# Patient Record
Sex: FEMALE | Race: WHITE | NOT HISPANIC OR LATINO | Employment: OTHER | URBAN - METROPOLITAN AREA
[De-identification: names, ages, dates, MRNs, and addresses within clinical notes are randomized per-mention and may not be internally consistent; named-entity substitution may affect disease eponyms.]

---

## 2017-01-12 ENCOUNTER — ANTICOAGULATION THERAPY VISIT (OUTPATIENT)
Dept: NURSING | Facility: CLINIC | Age: 77
End: 2017-01-12
Payer: COMMERCIAL

## 2017-01-12 DIAGNOSIS — Z79.01 LONG-TERM (CURRENT) USE OF ANTICOAGULANTS: Primary | ICD-10-CM

## 2017-01-12 DIAGNOSIS — D68.9 COAGULATION DEFECT (H): ICD-10-CM

## 2017-01-12 LAB — INR POINT OF CARE: 1.8 (ref 0.86–1.14)

## 2017-01-12 PROCEDURE — 85610 PROTHROMBIN TIME: CPT | Mod: QW

## 2017-01-12 PROCEDURE — 36416 COLLJ CAPILLARY BLOOD SPEC: CPT

## 2017-01-12 PROCEDURE — 99207 ZZC NO CHARGE NURSE ONLY: CPT

## 2017-01-12 NOTE — PROGRESS NOTES
ANTICOAGULATION FOLLOW-UP CLINIC VISIT    Patient Name:  Susan Ferguson  Date:  1/12/2017  Contact Type:  Face to Face    SUBJECTIVE:     Patient Findings     Positives Diet Changes, No Problem Findings           OBJECTIVE    INR PROTIME   Date Value Ref Range Status   01/12/2017 1.8* 0.86 - 1.14 Final     FACTOR 2 ASSAY   Date Value Ref Range Status   03/03/2009 19* 60 - 140 % Final       ASSESSMENT / PLAN  INR assessment SUB    Recheck INR In: 4 WEEKS    INR Location Clinic      Anticoagulation Summary as of 1/12/2017     INR goal 2.0-3.0   Selected INR 1.8! (1/12/2017)   Maintenance plan 7.5 mg (5 mg x 1.5) on Mon, Thu; 5 mg (5 mg x 1) all other days   Full instructions 7.5 mg on Mon, Thu; 5 mg all other days   Weekly total 40 mg   Plan last modified Hortencia Nava RN (1/12/2017)   Next INR check 2/9/2017   Target end date     Indications   Long-term (current) use of anticoagulants [Z79.01] [Z79.01]  Coagulation defect (HCC) [D68.9] [D68.9]         Anticoagulation Episode Summary     INR check location     Preferred lab     Send INR reminders to [facility]AG[facility]    Comments       Anticoagulation Care Providers     Provider Role Specialty Phone number    Sharon Velaily Alexa,  [facility] Internal Medicine [phone]            See the Encounter Report to view Anticoagulation Flowsheet and Dosing Calendar (Go to Encounters tab in chart review, and find the Anticoagulation Therapy Visit)    Diet and exercise changes.  Dosing based on [facility] Protocol and Provider directed care plan.      Hortencia Nava, RN

## 2017-01-12 NOTE — MR AVS SNAPSHOT
Susan Ferguson   1/12/2017 11:15 AM   Anticoagulation Therapy Visit    Description:  76 year old female   Provider:   ANTICOAGULATION CLINIC   Department:  [facility] Nurse           INR as of 1/12/2017     Selected INR 1.8! (1/12/2017)      Anticoagulation Summary as of 1/12/2017     INR goal 2.0-3.0   Selected INR 1.8! (1/12/2017)   Full instructions 7.5 mg on Mon, Thu; 5 mg all other days   Next INR check 2/9/2017    Indications   Long-term (current) use of anticoagulants [Z79.01] [Z79.01]  Coagulation defect (HCC) [D68.9] [D68.9]         Your next Anticoagulation Clinic appointment(s)     Feb 09, 2017 11:15 AM   Anticoagulation Visit with  ANTICOAGULATION CLINIC   [facility]    [address]  [place] [address]   [phone]              Contact Numbers     Clinic Number:         January 2017 Details    Sun Mon Tue Wed Thu Fri Sat     1               2               3               4               5               6               7                 8               9               10               11               12      7.5 mg   See details      13      5 mg         14      5 mg           15      5 mg         16      7.5 mg         17      5 mg         18      5 mg         19      7.5 mg         20      5 mg         21      5 mg           22      5 mg         23      7.5 mg         24      5 mg         25      5 mg         26      7.5 mg         27      5 mg         28      5 mg           29      5 mg         30      7.5 mg         31      5 mg              Date Details   01/12 This INR check               How to take your warfarin dose     To take:  5 mg Take 1 of the 5 mg tablets.    To take:  7.5 mg Take 1.5 of the 5 mg tablets.           February 2017 Details    Sun Mon Tue Wed Thu Fri Sat        1      5 mg         2      7.5 mg         3      5 mg         4      5 mg           5      5 mg         6      7.5 mg         7      5 mg         8      5 mg         9             10               11                 12               13               14               15               16               17               18                 19               20               21               22               23               24               25                 26               27               28                    Date Details   No additional details    Date of next INR:  2/9/2017         How to take your warfarin dose     To take:  5 mg Take 1 of the 5 mg tablets.    To take:  7.5 mg Take 1.5 of the 5 mg tablets.

## 2017-02-09 ENCOUNTER — ANTICOAGULATION THERAPY VISIT (OUTPATIENT)
Dept: NURSING | Facility: CLINIC | Age: 77
End: 2017-02-09
Payer: COMMERCIAL

## 2017-02-09 DIAGNOSIS — D68.9 COAGULATION DEFECT (H): ICD-10-CM

## 2017-02-09 DIAGNOSIS — Z79.01 LONG-TERM (CURRENT) USE OF ANTICOAGULANTS: Primary | ICD-10-CM

## 2017-02-09 LAB — INR POINT OF CARE: 1.8 (ref 0.86–1.14)

## 2017-02-09 PROCEDURE — 99207 ZZC NO CHARGE NURSE ONLY: CPT

## 2017-02-09 PROCEDURE — 85610 PROTHROMBIN TIME: CPT | Mod: QW

## 2017-02-09 PROCEDURE — 36416 COLLJ CAPILLARY BLOOD SPEC: CPT

## 2017-02-09 NOTE — PROGRESS NOTES
ANTICOAGULATION FOLLOW-UP CLINIC VISIT    Patient Name:  Susan Ferguson  Date:  2/9/2017  Contact Type:  Face to Face    SUBJECTIVE:     Patient Findings     Positives Diet Changes    Comments Diet changes, now eating greens every day.             OBJECTIVE    INR PROTIME   Date Value Ref Range Status   02/09/2017 1.8* 0.86 - 1.14 Final     FACTOR 2 ASSAY   Date Value Ref Range Status   03/03/2009 19* 60 - 140 % Final       ASSESSMENT / PLAN  INR assessment SUB    Recheck INR In: 2 WEEKS    INR Location Clinic      Anticoagulation Summary as of 2/9/2017     INR goal 2.0-3.0   Selected INR 1.8! (2/9/2017)   Maintenance plan 7.5 mg (5 mg x 1.5) on Mon, Wed, Fri; 5 mg (5 mg x 1) all other days   Full instructions 2/9: 7.5 mg; 2/10: 7.5 mg; Otherwise 7.5 mg on Mon, Wed, Fri; 5 mg all other days   Weekly total 42.5 mg   Plan last modified Socorro Bryant RN (2/9/2017)   Next INR check 2/23/2017   Target end date     Indications   Long-term (current) use of anticoagulants [Z79.01] [Z79.01]  Coagulation defect (HCC) [D68.9] [D68.9]         Anticoagulation Episode Summary     INR check location     Preferred lab     Send INR reminders to [facility] ANT[facility]AG[facility]    Comments       Anticoagulation Care Providers     Provider Role Specialty Phone number    Lety VelaDO [facility] Internal Medicine [phone]            See the Encounter Report to view Anticoagulation Flowsheet and Dosing Calendar (Go to Encounters tab in chart review, and find the Anticoagulation Therapy Visit)    Dosage adjustment made based on physician directed care plan.    Socorro Bryant RN

## 2017-02-09 NOTE — MR AVS SNAPSHOT
Susan Ferguson   2/9/2017 11:15 AM   Anticoagulation Therapy Visit    Description:  76 year old female   Provider:   ANTICOAGULATION CLINIC   Department:   Nurse           INR as of 2/9/2017     Selected INR 1.8! (2/9/2017)      Anticoagulation Summary as of 2/9/2017     INR goal 2.0-3.0   Selected INR 1.8! (2/9/2017)   Full instructions 2/9: 7.5 mg; 2/10: 7.5 mg; Otherwise 7.5 mg on Mon, Wed, Fri; 5 mg all other days   Next INR check 2/23/2017    Indications   Long-term (current) use of anticoagulants [Z79.01] [Z79.01]  Coagulation defect (HCC) [D68.9] [D68.9]         Your next Anticoagulation Clinic appointment(s)     Feb 09, 2017 11:15 AM   Anticoagulation Visit with  ANTICOAGULATION CLINIC   [facility] ([facility])    [address]  [place] [address]   [phone]            Feb 23, 2017 11:30 AM   Anticoagulation Visit with  ANTICOAGULATION CLINIC   [facility] ([facility])    [address]  [place] [address]   [phone]              Contact Numbers     Clinic Number:         February 2017 Details    Sun Mon Tue Wed Thu Fri Sat        1               2               3               4                 5               6               7               8               9      7.5 mg   See details      10      7.5 mg         11      5 mg           12      5 mg         13      7.5 mg         14      5 mg         15      7.5 mg         16      5 mg         17      7.5 mg         18      5 mg           19      5 mg         20      7.5 mg         21      5 mg         22      7.5 mg         23            24               25                 26               27               28                    Date Details   02/09 This INR check       Date of next INR:  2/23/2017         How to take your warfarin dose     To take:  5 mg Take 1 of the 5 mg tablets.    To take:  7.5 mg Take 1.5 of the 5 mg tablets.

## 2017-02-23 ENCOUNTER — ANTICOAGULATION THERAPY VISIT (OUTPATIENT)
Dept: NURSING | Facility: CLINIC | Age: 77
End: 2017-02-23
Payer: COMMERCIAL

## 2017-02-23 DIAGNOSIS — D68.9 COAGULATION DEFECT (H): ICD-10-CM

## 2017-02-23 DIAGNOSIS — Z79.01 LONG-TERM (CURRENT) USE OF ANTICOAGULANTS: ICD-10-CM

## 2017-02-23 LAB — INR POINT OF CARE: 2.8 (ref 0.86–1.14)

## 2017-02-23 PROCEDURE — 99207 ZZC NO CHARGE NURSE ONLY: CPT

## 2017-02-23 PROCEDURE — 36416 COLLJ CAPILLARY BLOOD SPEC: CPT

## 2017-02-23 PROCEDURE — 85610 PROTHROMBIN TIME: CPT | Mod: QW

## 2017-02-23 NOTE — MR AVS SNAPSHOT
Susan Ferguson   2/23/2017 11:30 AM   Anticoagulation Therapy Visit    Description:  76 year old female   Provider:   ANTICOAGULATION CLINIC   Department:   Nurse           INR as of 2/23/2017     Today's INR 2.8      Anticoagulation Summary as of 2/23/2017     INR goal 2.0-3.0   Today's INR 2.8   Full instructions 7.5 mg on Mon, Wed, Fri; 5 mg all other days   Next INR check 3/16/2017    Indications   Long-term (current) use of anticoagulants [Z79.01] [Z79.01]  Coagulation defect (HCC) [D68.9] [D68.9]         Your next Anticoagulation Clinic appointment(s)     Feb 23, 2017 11:30 AM CST   Anticoagulation Visit with  ANTICOAGULATION CLINIC   [facility]    [address]  [place] [address]   [phone]            Mar 16, 2017 11:15 AM CDT   Anticoagulation Visit with  ANTICOAGULATION CLINIC   [facility]    [address]  [place] [address]   [phone]              Contact Numbers     Clinic Number:         February 2017 Details    Sun Mon Tue Wed Thu Fri Sat        1               2               3               4                 5               6               7               8               9               10               11                 12               13               14               15               16               17               18                 19               20               21               22               23      5 mg   See details      24      7.5 mg         25      5 mg           26      5 mg         27      7.5 mg         28      5 mg              Date Details   02/23 This INR check               How to take your warfarin dose     To take:  5 mg Take 1 of the 5 mg tablets.    To take:  7.5 mg Take 1.5 of the 5 mg tablets.           March 2017 Details    Sun Mon Tue Wed Thu Fri Sat        1      7.5 mg         2      5 mg         3      7.5 mg         4      5 mg           5      5 mg          6      7.5 mg         7      5 mg         8      7.5 mg         9      5 mg         10      7.5 mg         11      5 mg           12      5 mg         13      7.5 mg         14      5 mg         15      7.5 mg         16            17               18                 19               20               21               22               23               24               25                 26               27               28               29               30               31                 Date Details   No additional details    Date of next INR:  3/16/2017         How to take your warfarin dose     To take:  5 mg Take 1 of the 5 mg tablets.    To take:  7.5 mg Take 1.5 of the 5 mg tablets.

## 2017-02-23 NOTE — PROGRESS NOTES
ANTICOAGULATION FOLLOW-UP CLINIC VISIT    Patient Name:  Susan Ferguson  Date:  2/23/2017  Contact Type:  Face to Face    SUBJECTIVE:        OBJECTIVE    INR Protime   Date Value Ref Range Status   02/23/2017 2.8 (A) 0.86 - 1.14 Final     Factor 2 Assay   Date Value Ref Range Status   03/03/2009 19 (L) 60 - 140 % Final       ASSESSMENT / PLAN  INR assessment THER    Recheck INR In: 4 WEEKS    INR Location Clinic      Anticoagulation Summary as of 2/23/2017     INR goal 2.0-3.0   Today's INR 2.8   Maintenance plan 7.5 mg (5 mg x 1.5) on Mon, Wed, Fri; 5 mg (5 mg x 1) all other days   Full instructions 7.5 mg on Mon, Wed, Fri; 5 mg all other days   Weekly total 42.5 mg   No change documented Melida Belle, EMANUEL   Plan last modified Socorro Bryant RN (2/9/2017)   Next INR check 3/16/2017   Target end date     Indications   Long-term (current) use of anticoagulants [Z79.01] [Z79.01]  Coagulation defect (HCC) [D68.9] [D68.9]         Anticoagulation Episode Summary     INR check location     Preferred lab     Send INR reminders to [facility]AG[facility]    Comments       Anticoagulation Care Providers     Provider Role Specialty Phone number    Lety Vela,  [facility] Internal Medicine [phone]            See the Encounter Report to view Anticoagulation Flowsheet and Dosing Calendar (Go to Encounters tab in chart review, and find the Anticoagulation Therapy Visit)    Dosage adjustment made based on physician directed care plan.    Melida Belle RN

## 2017-03-23 ENCOUNTER — ANTICOAGULATION THERAPY VISIT (OUTPATIENT)
Dept: NURSING | Facility: CLINIC | Age: 77
End: 2017-03-23
Payer: COMMERCIAL

## 2017-03-23 DIAGNOSIS — D68.9 COAGULATION DEFECT (H): ICD-10-CM

## 2017-03-23 DIAGNOSIS — Z79.01 LONG-TERM (CURRENT) USE OF ANTICOAGULANTS: ICD-10-CM

## 2017-03-23 LAB — INR POINT OF CARE: 2.9 (ref 0.86–1.14)

## 2017-03-23 PROCEDURE — 85610 PROTHROMBIN TIME: CPT | Mod: QW

## 2017-03-23 PROCEDURE — 36416 COLLJ CAPILLARY BLOOD SPEC: CPT

## 2017-03-23 PROCEDURE — 99207 ZZC NO CHARGE NURSE ONLY: CPT

## 2017-03-23 NOTE — PROGRESS NOTES
ANTICOAGULATION FOLLOW-UP CLINIC VISIT    Patient Name:  Susan Ferguson  Date:  3/23/2017  Contact Type:  Face to Face    SUBJECTIVE:     Patient Findings     Positives No Problem Findings           OBJECTIVE    INR Protime   Date Value Ref Range Status   03/23/2017 2.9 (A) 0.86 - 1.14 Final     Factor 2 Assay   Date Value Ref Range Status   03/03/2009 19 (L) 60 - 140 % Final       ASSESSMENT / PLAN  INR assessment THER    Recheck INR In: 4 WEEKS    INR Location Clinic      Anticoagulation Summary as of 3/23/2017     INR goal 2.0-3.0   Today's INR 2.9   Maintenance plan 7.5 mg (5 mg x 1.5) on Mon, Wed, Fri; 5 mg (5 mg x 1) all other days   Full instructions 7.5 mg on Mon, Wed, Fri; 5 mg all other days   Weekly total 42.5 mg   No change documented Hortencia Nava RN   Plan last modified Socorro Bryant RN (2/9/2017)   Next INR check 4/20/2017   Priority INR   Target end date     Indications   Long-term (current) use of anticoagulants [Z79.01] [Z79.01]  Coagulation defect (HCC) [D68.9] [D68.9]         Anticoagulation Episode Summary     INR check location     Preferred lab     Send INR reminders to [facility] ANTICOAGULATION    Comments       Anticoagulation Care Providers     Provider Role Specialty Phone number    VelaSharonLety DO Alexa [facility] Internal Medicine [phone]            See the Encounter Report to view Anticoagulation Flowsheet and Dosing Calendar (Go to Encounters tab in chart review, and find the Anticoagulation Therapy Visit)    Dosing based on [facility] Protocol and Provider directed care plan.      Hortencia Nava RN

## 2017-03-23 NOTE — MR AVS SNAPSHOT
Susan Ferguson   3/23/2017 11:15 AM   Anticoagulation Therapy Visit    Description:  76 year old female   Provider:   ANTICOAGULATION CLINIC   Department:   Nurse           INR as of 3/23/2017     Today's INR 2.9      Anticoagulation Summary as of 3/23/2017     INR goal 2.0-3.0   Today's INR 2.9   Full instructions 7.5 mg on Mon, Wed, Fri; 5 mg all other days   Next INR check 4/20/2017    Indications   Long-term (current) use of anticoagulants [Z79.01] [Z79.01]  Coagulation defect (HCC) [D68.9] [D68.9]         Your next Anticoagulation Clinic appointment(s)     Mar 23, 2017 11:15 AM CDT   Anticoagulation Visit with  ANTICOAGULATION CLINIC   [facility]    [address]  [place] [address]   [phone]            Mar 30, 2017 11:15 AM CDT   Anticoagulation Visit with  ANTICOAGULATION CLINIC   [facility])    [address]  [place] [address]   [phone]              Contact Numbers     Clinic Number:         March 2017 Details    Sun Mon Tue Wed Thu Fri Sat        1               2               3               4                 5               6               7               8               9               10               11                 12               13               14               15               16               17               18                 19               20               21               22               23      5 mg   See details      24      7.5 mg         25      5 mg           26      5 mg         27      7.5 mg         28      5 mg         29      7.5 mg         30      5 mg         31      7.5 mg           Date Details   03/23 This INR check               How to take your warfarin dose     To take:  5 mg Take 1 of the 5 mg tablets.    To take:  7.5 mg Take 1.5 of the 5 mg tablets.           April 2017 Details    Sun Mon Tue Wed Thu Fri Sat           1      5 mg           2      5 mg          3      7.5 mg         4      5 mg         5      7.5 mg         6      5 mg         7      7.5 mg         8      5 mg           9      5 mg         10      7.5 mg         11      5 mg         12      7.5 mg         13      5 mg         14      7.5 mg         15      5 mg           16      5 mg         17      7.5 mg         18      5 mg         19      7.5 mg         20            21               22                 23               24               25               26               27               28               29                 30                      Date Details   No additional details    Date of next INR:  4/20/2017         How to take your warfarin dose     To take:  5 mg Take 1 of the 5 mg tablets.    To take:  7.5 mg Take 1.5 of the 5 mg tablets.

## 2017-04-20 ENCOUNTER — ANTICOAGULATION THERAPY VISIT (OUTPATIENT)
Dept: NURSING | Facility: CLINIC | Age: 77
End: 2017-04-20
Payer: COMMERCIAL

## 2017-04-20 DIAGNOSIS — Z79.01 LONG-TERM (CURRENT) USE OF ANTICOAGULANTS: ICD-10-CM

## 2017-04-20 DIAGNOSIS — D68.9 COAGULATION DEFECT (H): ICD-10-CM

## 2017-04-20 LAB — INR POINT OF CARE: 3.8 (ref 0.86–1.14)

## 2017-04-20 PROCEDURE — 85610 PROTHROMBIN TIME: CPT | Mod: QW

## 2017-04-20 PROCEDURE — 36416 COLLJ CAPILLARY BLOOD SPEC: CPT

## 2017-04-20 PROCEDURE — 99207 ZZC NO CHARGE NURSE ONLY: CPT

## 2017-04-20 NOTE — MR AVS SNAPSHOT
Susan Ferguson   4/20/2017 11:30 AM   Anticoagulation Therapy Visit    Description:  76 year old female   Provider:   ANTICOAGULATION CLINIC   Department:   Nurse           INR as of 4/20/2017     Today's INR 3.8!      Anticoagulation Summary as of 4/20/2017     INR goal 2.0-3.0   Today's INR 3.8!   Full instructions 7.5 mg on Mon, Wed, Fri; 5 mg all other days   Next INR check 5/18/2017    Indications   Long-term (current) use of anticoagulants [Z79.01] [Z79.01]  Coagulation defect (HCC) [D68.9] [D68.9]         Your next Anticoagulation Clinic appointment(s)     Apr 20, 2017 11:30 AM CDT   Anticoagulation Visit with  ANTICOAGULATION CLINIC   [facility]    [address]  [place] [address]   [phone]            May 18, 2017 11:15 AM CDT   Anticoagulation Visit with  ANTICOAGULATION CLINIC   [facility])    [address]  [place] [address]   [phone]              Contact Numbers     Clinic Number:         April 2017 Details    Sun Mon Tue Wed Thu Fri Sat           1                 2               3               4               5               6               7               8                 9               10               11               12               13               14               15                 16               17               18               19               20      5 mg   See details      21      7.5 mg         22      5 mg           23      5 mg         24      7.5 mg         25      5 mg         26      7.5 mg         27      5 mg         28      7.5 mg         29      5 mg           30      5 mg                Date Details   04/20 This INR check               How to take your warfarin dose     To take:  5 mg Take 1 of the 5 mg tablets.    To take:  7.5 mg Take 1.5 of the 5 mg tablets.           May 2017 Details    Sun Mon Tue Wed Thu Fri Sat      1      7.5 mg         2      5 mg         3       7.5 mg         4      5 mg         5      7.5 mg         6      5 mg           7      5 mg         8      7.5 mg         9      5 mg         10      7.5 mg         11      5 mg         12      7.5 mg         13      5 mg           14      5 mg         15      7.5 mg         16      5 mg         17      7.5 mg         18            19               20                 21               22               23               24               25               26               27                 28               29               30               31                   Date Details   No additional details    Date of next INR:  5/18/2017         How to take your warfarin dose     To take:  5 mg Take 1 of the 5 mg tablets.    To take:  7.5 mg Take 1.5 of the 5 mg tablets.

## 2017-04-20 NOTE — PROGRESS NOTES
ANTICOAGULATION FOLLOW-UP CLINIC VISIT    Patient Name:  Susan Ferguson  Date:  4/20/2017  Contact Type:  Face to Face    SUBJECTIVE:        OBJECTIVE    INR Protime   Date Value Ref Range Status   04/20/2017 3.8 (A) 0.86 - 1.14 Final     Factor 2 Assay   Date Value Ref Range Status   03/03/2009 19 (L) 60 - 140 % Final       ASSESSMENT / PLAN  INR assessment SUPRA    Recheck INR In: 4 WEEKS    INR Location Clinic      Anticoagulation Summary as of 4/20/2017     INR goal 2.0-3.0   Today's INR 3.8!   Maintenance plan 7.5 mg (5 mg x 1.5) on Mon, Wed, Fri; 5 mg (5 mg x 1) all other days   Full instructions 7.5 mg on Mon, Wed, Fri; 5 mg all other days   Weekly total 42.5 mg   No change documented Hortencia Nava RN   Plan last modified Socorro Bryant RN (2/9/2017)   Next INR check 5/18/2017   Priority INR   Target end date     Indications   Long-term (current) use of anticoagulants [Z79.01] [Z79.01]  Coagulation defect (HCC) [D68.9] [D68.9]         Anticoagulation Episode Summary     INR check location     Preferred lab     Send INR reminders to [facility] ANTICOAGULATION    Comments       Anticoagulation Care Providers     Provider Role Specialty Phone number    Lety Vela,  [facility] Internal Medicine [phone]            See the Encounter Report to view Anticoagulation Flowsheet and Dosing Calendar (Go to Encounters tab in chart review, and find the Anticoagulation Therapy Visit)    Patient prefers to increase Vit K intake rather than hold dose or change dose.  No signs of bruising or bleeding.  Understands to watch for signs and call right away if seen. Dosing based on [facility] Protocol and Provider directed care plan.      Hortencia Nava, RN

## 2017-05-18 ENCOUNTER — ANTICOAGULATION THERAPY VISIT (OUTPATIENT)
Dept: NURSING | Facility: CLINIC | Age: 77
End: 2017-05-18
Payer: COMMERCIAL

## 2017-05-18 DIAGNOSIS — D68.9 COAGULATION DEFECT (H): ICD-10-CM

## 2017-05-18 DIAGNOSIS — Z79.01 LONG-TERM (CURRENT) USE OF ANTICOAGULANTS: ICD-10-CM

## 2017-05-18 LAB — INR POINT OF CARE: 2.5 (ref 0.86–1.14)

## 2017-05-18 PROCEDURE — 85610 PROTHROMBIN TIME: CPT | Mod: QW

## 2017-05-18 PROCEDURE — 36416 COLLJ CAPILLARY BLOOD SPEC: CPT

## 2017-05-18 PROCEDURE — 99207 ZZC NO CHARGE NURSE ONLY: CPT

## 2017-05-18 NOTE — MR AVS SNAPSHOT
Susan Ferguson   5/18/2017 11:15 AM   Anticoagulation Therapy Visit    Description:  76 year old female   Provider:   ANTICOAGULATION CLINIC   Department:   Nurse           INR as of 5/18/2017     Today's INR 2.5      Anticoagulation Summary as of 5/18/2017     INR goal 2.0-3.0   Today's INR 2.5   Full instructions 7.5 mg on Mon, Wed, Fri; 5 mg all other days   Next INR check 6/15/2017    Indications   Long-term (current) use of anticoagulants [Z79.01] [Z79.01]  Coagulation defect (HCC) [D68.9] [D68.9]         Your next Anticoagulation Clinic appointment(s)     May 18, 2017 11:15 AM CDT   Anticoagulation Visit with  ANTICOAGULATION CLINIC   [facility]    [address]  [place] [address]   [phone]            Nik 15, 2017 11:00 AM CDT   Anticoagulation Visit with  ANTICOAGULATION CLINIC   [facility]    [address]  [place] [address]   [phone]              Contact Numbers     Clinic Number:         May 2017 Details    Sun Mon Tue Wed Thu Fri Sat      1               2               3               4               5               6                 7               8               9               10               11               12               13                 14               15               16               17               18      5 mg   See details      19      7.5 mg         20      5 mg           21      5 mg         22      7.5 mg         23      5 mg         24      7.5 mg         25      5 mg         26      7.5 mg         27      5 mg           28      5 mg         29      7.5 mg         30      5 mg         31      7.5 mg             Date Details   05/18 This INR check               How to take your warfarin dose     To take:  5 mg Take 1 of the 5 mg tablets.    To take:  7.5 mg Take 1.5 of the 5 mg tablets.           June 2017 Details    Sun Mon Tue Wed Thu Fri Sat         1      5 mg         2       7.5 mg         3      5 mg           4      5 mg         5      7.5 mg         6      5 mg         7      7.5 mg         8      5 mg         9      7.5 mg         10      5 mg           11      5 mg         12      7.5 mg         13      5 mg         14      7.5 mg         15            16               17                 18               19               20               21               22               23               24                 25               26               27               28               29               30                 Date Details   No additional details    Date of next INR:  6/15/2017         How to take your warfarin dose     To take:  5 mg Take 1 of the 5 mg tablets.    To take:  7.5 mg Take 1.5 of the 5 mg tablets.

## 2017-06-15 ENCOUNTER — ANTICOAGULATION THERAPY VISIT (OUTPATIENT)
Dept: NURSING | Facility: CLINIC | Age: 77
End: 2017-06-15
Payer: COMMERCIAL

## 2017-06-15 DIAGNOSIS — Z79.01 LONG-TERM (CURRENT) USE OF ANTICOAGULANTS: ICD-10-CM

## 2017-06-15 DIAGNOSIS — D68.9 COAGULATION DEFECT (H): ICD-10-CM

## 2017-06-15 LAB — INR POINT OF CARE: 2.8 (ref 0.86–1.14)

## 2017-06-15 PROCEDURE — 85610 PROTHROMBIN TIME: CPT | Mod: QW

## 2017-06-15 PROCEDURE — 99207 ZZC NO CHARGE NURSE ONLY: CPT

## 2017-06-15 PROCEDURE — 36416 COLLJ CAPILLARY BLOOD SPEC: CPT

## 2017-06-15 NOTE — PROGRESS NOTES
ANTICOAGULATION FOLLOW-UP CLINIC VISIT    Patient Name:  Susan Ferguson  Date:  6/15/2017  Contact Type:  Face to Face    SUBJECTIVE:     Patient Findings     Positives No Problem Findings           OBJECTIVE    INR Protime   Date Value Ref Range Status   06/15/2017 2.8 (A) 0.86 - 1.14 Final     Factor 2 Assay   Date Value Ref Range Status   03/03/2009 19 (L) 60 - 140 % Final       ASSESSMENT / PLAN  INR assessment THER    Recheck INR In: 4 WEEKS    INR Location Clinic      Anticoagulation Summary as of 6/15/2017     INR goal 2.0-3.0   Today's INR 2.8   Maintenance plan 7.5 mg (5 mg x 1.5) on Mon, Wed, Fri; 5 mg (5 mg x 1) all other days   Full instructions 7.5 mg on Mon, Wed, Fri; 5 mg all other days   Weekly total 42.5 mg   No change documented Lien Ray RN   Plan last modified Socorro Bryant RN (2/9/2017)   Next INR check 7/13/2017   Priority INR   Target end date     Indications   Long-term (current) use of anticoagulants [Z79.01] [Z79.01]  Coagulation defect (HCC) [D68.9] [D68.9]         Anticoagulation Episode Summary     INR check location     Preferred lab     Send INR reminders to [facility] ANT[facility]AGUL[facility]    Comments       Anticoagulation Care Providers     Provider Role Specialty Phone number    Lety Vela DO [facility] Internal Medicine [phone]            See the Encounter Report to view Anticoagulation Flowsheet and Dosing Calendar (Go to Encounters tab in chart review, and find the Anticoagulation Therapy Visit)    Dosage adjustment made based on physician directed care plan.    Lien Ray, RN

## 2017-06-15 NOTE — MR AVS SNAPSHOT
Susan Ferguson   6/15/2017 11:00 AM   Anticoagulation Therapy Visit    Description:  77 year old female   Provider:   ANTICOAGULATION CLINIC   Department:   Nurse           INR as of 6/15/2017     Today's INR 2.8      Anticoagulation Summary as of 6/15/2017     INR goal 2.0-3.0   Today's INR 2.8   Full instructions 7.5 mg on Mon, Wed, Fri; 5 mg all other days   Next INR check 7/13/2017    Indications   Long-term (current) use of anticoagulants [Z79.01] [Z79.01]  Coagulation defect (HCC) [D68.9] [D68.9]         Your next Anticoagulation Clinic appointment(s)     Nik 15, 2017 11:00 AM CDT   Anticoagulation [facility] with  [facility] CLINIC   [facility]    [address]  [place] [address]   [phone]            Jul 13, 2017 11:00 AM CDT   Anticoagulation Visit with  ANTICOAGULATION CLINIC   [facility]    [address]  [place] [address]   [phone]              Contact Numbers     Clinic Number:         June 2017 Details    Sun Mon Tue Wed Thu Fri Sat         1               2               3                 4               5               6               7               8               9               10                 11               12               13               14               15      5 mg   See details      16      7.5 mg         17      5 mg           18      5 mg         19      7.5 mg         20      5 mg         21      7.5 mg         22      5 mg         23      7.5 mg         24      5 mg           25      5 mg         26      7.5 mg         27      5 mg         28      7.5 mg         29      5 mg         30      7.5 mg           Date Details   06/15 This INR check               How to take your warfarin dose     To take:  5 mg Take 1 of the 5 mg tablets.    To take:  7.5 mg Take 1.5 of the 5 mg tablets.           July 2017 Details    Sun Mon Tue Wed Thu Fri Sat           1      5 mg           2       5 mg         3      7.5 mg         4      5 mg         5      7.5 mg         6      5 mg         7      7.5 mg         8      5 mg           9      5 mg         10      7.5 mg         11      5 mg         12      7.5 mg         13            14               15                 16               17               18               19               20               21               22                 23               24               25               26               27               28               29                 30               31                     Date Details   No additional details    Date of next INR:  7/13/2017         How to take your warfarin dose     To take:  5 mg Take 1 of the 5 mg tablets.    To take:  7.5 mg Take 1.5 of the 5 mg tablets.

## 2017-07-13 ENCOUNTER — ANTICOAGULATION THERAPY VISIT (OUTPATIENT)
Dept: NURSING | Facility: CLINIC | Age: 77
End: 2017-07-13
Payer: COMMERCIAL

## 2017-07-13 DIAGNOSIS — D68.9 COAGULATION DEFECT (H): ICD-10-CM

## 2017-07-13 DIAGNOSIS — Z79.01 LONG-TERM (CURRENT) USE OF ANTICOAGULANTS: ICD-10-CM

## 2017-07-13 LAB — INR POINT OF CARE: 3.9 (ref 0.86–1.14)

## 2017-07-13 PROCEDURE — 99207 ZZC NO CHARGE NURSE ONLY: CPT

## 2017-07-13 PROCEDURE — 85610 PROTHROMBIN TIME: CPT | Mod: QW

## 2017-07-13 PROCEDURE — 36416 COLLJ CAPILLARY BLOOD SPEC: CPT

## 2017-07-13 RX ORDER — WARFARIN SODIUM 5 MG/1
5-7.5 TABLET ORAL DAILY
Qty: 135 TABLET | Refills: 0 | Status: SHIPPED | OUTPATIENT
Start: 2017-07-13 | End: 2017-07-13

## 2017-07-13 RX ORDER — WARFARIN SODIUM 5 MG/1
5-7.5 TABLET ORAL DAILY
Qty: 135 TABLET | Refills: 0 | Status: SHIPPED | OUTPATIENT
Start: 2017-07-13 | End: 2017-10-26

## 2017-07-13 NOTE — MR AVS SNAPSHOT
Susan Ferguson   7/13/2017 11:00 AM   Anticoagulation Therapy Visit    Description:  77 year old female   Provider:   ANTICOAGULATION CLINIC   Department:   Nurse           INR as of 7/13/2017     Today's INR 3.9!      Anticoagulation Summary as of 7/13/2017     INR goal 2.0-3.0   Today's INR 3.9!   Full instructions 7/13: Hold; Otherwise 7.5 mg on Mon, Wed, Fri; 5 mg all other days   Next INR check 8/3/2017    Indications   Long-term (current) use of anticoagulants [Z79.01] [Z79.01]  Coagulation defect (HCC) [D68.9] [D68.9]         Your next Anticoagulation Clinic appointment(s)     Jul 13, 2017 11:00 AM CDT   Anticoagulation Visit with  ANTICOAGULATION CLINIC   [facility]    [address]  [place] [address]   [phone]            Aug 03, 2017 11:15 AM CDT   Anticoagulation Visit with  ANTICOAGULATION CLINIC   [facility]    [address]  [place] [address]   [phone]              Contact Numbers     Clinic Number:         July 2017 Details    Sun Mon Tue Wed Thu Fri Sat           1                 2               3               4               5               6               7               8                 9               10               11               12               13      Hold   See details      14      7.5 mg         15      5 mg           16      5 mg         17      7.5 mg         18      5 mg         19      7.5 mg         20      5 mg         21      7.5 mg         22      5 mg           23      5 mg         24      7.5 mg         25      5 mg         26      7.5 mg         27      5 mg         28      7.5 mg         29      5 mg           30      5 mg         31      7.5 mg               Date Details   07/13 This INR check               How to take your warfarin dose     To take:  5 mg Take 1 of the 5 mg tablets.    To take:  7.5 mg Take 1.5 of the 5 mg tablets.    Hold Do not take your  warfarin dose. See the Details table to the right for additional instructions.                August 2017 Details    Sun Mon Tue Wed Thu Fri Sat       1      5 mg         2      7.5 mg         3            4               5                 6               7               8               9               10               11               12                 13               14               15               16               17               18               19                 20               21               22               23               24               25               26                 27               28               29               30               31                  Date Details   No additional details    Date of next INR:  8/3/2017         How to take your warfarin dose     To take:  5 mg Take 1 of the 5 mg tablets.    To take:  7.5 mg Take 1.5 of the 5 mg tablets.

## 2017-07-13 NOTE — PROGRESS NOTES
ANTICOAGULATION FOLLOW-UP CLINIC VISIT    Patient Name:  Susan Ferguson  Date:  7/13/2017  Contact Type:  Face to Face    SUBJECTIVE:     Patient Findings     Comments Increased pain from overdoing it at exercise class.  Taking max doses of Tylenol.            OBJECTIVE    INR Protime   Date Value Ref Range Status   07/13/2017 3.9 (A) 0.86 - 1.14 Final     Factor 2 Assay   Date Value Ref Range Status   03/03/2009 19 (L) 60 - 140 % Final       ASSESSMENT / PLAN  INR assessment SUPRA    Recheck INR In: 3 WEEKS    INR Location Clinic      Anticoagulation Summary as of 7/13/2017     INR goal 2.0-3.0   Today's INR 3.9!   Maintenance plan 7.5 mg (5 mg x 1.5) on Mon, Wed, Fri; 5 mg (5 mg x 1) all other days   Full instructions 7/13: Hold; Otherwise 7.5 mg on Mon, Wed, Fri; 5 mg all other days   Weekly total 42.5 mg   Plan last modified Socorro Bryant RN (2/9/2017)   Next INR check 8/3/2017   Priority INR   Target end date     Indications   Long-term (current) use of anticoagulants [Z79.01] [Z79.01]  Coagulation defect (HCC) [D68.9] [D68.9]         Anticoagulation Episode Summary     INR check location     Preferred lab     Send INR reminders to [facility]AG[facility]    Comments       Anticoagulation Care Providers     Provider Role Specialty Phone number    Lety Vela DO [facility] Internal Medicine [phone]            See the Encounter Report to view Anticoagulation Flowsheet and Dosing Calendar (Go to Encounters tab in chart review, and find the Anticoagulation Therapy Visit)    Dosage adjustment made based on physician directed care plan.    Lien Ray RN

## 2017-08-03 ENCOUNTER — ANTICOAGULATION THERAPY VISIT (OUTPATIENT)
Dept: NURSING | Facility: CLINIC | Age: 77
End: 2017-08-03
Payer: COMMERCIAL

## 2017-08-03 DIAGNOSIS — D68.9 COAGULATION DEFECT (H): ICD-10-CM

## 2017-08-03 DIAGNOSIS — Z79.01 LONG-TERM (CURRENT) USE OF ANTICOAGULANTS: ICD-10-CM

## 2017-08-03 LAB — INR POINT OF CARE: 3.2 (ref 0.86–1.14)

## 2017-08-03 PROCEDURE — 36416 COLLJ CAPILLARY BLOOD SPEC: CPT

## 2017-08-03 PROCEDURE — 99207 ZZC NO CHARGE NURSE ONLY: CPT

## 2017-08-03 PROCEDURE — 85610 PROTHROMBIN TIME: CPT | Mod: QW

## 2017-08-03 NOTE — MR AVS SNAPSHOT
Susan Ferguson   8/3/2017 11:15 AM   Anticoagulation Therapy Visit    Description:  77 year old female   Provider:   ANTICOAGULATION CLINIC   Department:   Nurse           INR as of 8/3/2017     Today's INR 3.2!      Anticoagulation Summary as of 8/3/2017     INR goal 2.0-3.0   Today's INR 3.2!   Full instructions 8/4: 5 mg; Otherwise 7.5 mg on Mon, Wed, Fri; 5 mg all other days   Next INR check 8/31/2017    Indications   Long-term (current) use of anticoagulants [Z79.01] [Z79.01]  Coagulation defect (HCC) [D68.9] [D68.9]         Your next Anticoagulation Clinic appointment(s)     Aug 03, 2017 11:15 AM CDT   Anticoagulation Visit with  ANTICOAGULATION CLINIC   [facility]    [address]  [place] [address]   [phone]            Aug 31, 2017 11:15 AM CDT   Anticoagulation Visit with  ANTICOAGULATION CLINIC   [facility]    [address]  [place] [address]   [phone]              Contact Numbers     Clinic Number:         August 2017 Details    Sun Mon Tue Wed Thu Fri Sat       1               2               3      5 mg   See details      4      5 mg         5      5 mg           6      5 mg         7      7.5 mg         8      5 mg         9      7.5 mg         10      5 mg         11      7.5 mg         12      5 mg           13      5 mg         14      7.5 mg         15      5 mg         16      7.5 mg         17      5 mg         18      7.5 mg         19      5 mg           20      5 mg         21      7.5 mg         22      5 mg         23      7.5 mg         24      5 mg         25      7.5 mg         26      5 mg           27      5 mg         28      7.5 mg         29      5 mg         30      7.5 mg         31               Date Details   08/03 This INR check       Date of next INR:  8/31/2017         How to take your warfarin dose     To take:  5 mg Take 1 of the 5 mg tablets.    To take:  7.5 mg Take  1.5 of the 5 mg tablets.

## 2017-08-03 NOTE — PROGRESS NOTES
ANTICOAGULATION FOLLOW-UP CLINIC VISIT    Patient Name:  Susan Ferguson  Date:  8/3/2017  Contact Type:  Face to Face    SUBJECTIVE:     Patient Findings     Comments Reports right shoulder pain.  Still doing water aerobics.    Also hasn't been eating her normal amt of greens.             OBJECTIVE    INR Protime   Date Value Ref Range Status   08/03/2017 3.2 (A) 0.86 - 1.14 Final     Factor 2 Assay   Date Value Ref Range Status   03/03/2009 19 (L) 60 - 140 % Final       ASSESSMENT / PLAN  INR assessment SUPRA    Recheck INR In: 4 WEEKS    INR Location Clinic      Anticoagulation Summary as of 8/3/2017     INR goal 2.0-3.0   Today's INR 3.2!   Maintenance plan 7.5 mg (5 mg x 1.5) on Mon, Wed, Fri; 5 mg (5 mg x 1) all other days   Full instructions 8/4: 5 mg; Otherwise 7.5 mg on Mon, Wed, Fri; 5 mg all other days   Weekly total 42.5 mg   Plan last modified Socorro Bryant RN (2/9/2017)   Next INR check 8/31/2017   Priority INR   Target end date     Indications   Long-term (current) use of anticoagulants [Z79.01] [Z79.01]  Coagulation defect (HCC) [D68.9] [D68.9]         Anticoagulation Episode Summary     INR check location     Preferred lab     Send INR reminders to [facility]AG[facility]    Comments       Anticoagulation Care Providers     Provider Role Specialty Phone number    VelaLety davey DO [facility] Internal Medicine [phone]            See the Encounter Report to view Anticoagulation Flowsheet and Dosing Calendar (Go to Encounters tab in chart review, and find the Anticoagulation Therapy Visit)    Dosage adjustment made based on physician directed care plan.    Lien Ray RN

## 2017-08-31 ENCOUNTER — ANTICOAGULATION THERAPY VISIT (OUTPATIENT)
Dept: NURSING | Facility: CLINIC | Age: 77
End: 2017-08-31
Payer: COMMERCIAL

## 2017-08-31 DIAGNOSIS — Z79.01 LONG-TERM (CURRENT) USE OF ANTICOAGULANTS: ICD-10-CM

## 2017-08-31 DIAGNOSIS — D68.9 COAGULATION DEFECT (H): ICD-10-CM

## 2017-08-31 LAB — INR POINT OF CARE: 1.9 (ref 0.86–1.14)

## 2017-08-31 PROCEDURE — 85610 PROTHROMBIN TIME: CPT | Mod: QW

## 2017-08-31 PROCEDURE — 99207 ZZC NO CHARGE NURSE ONLY: CPT

## 2017-08-31 PROCEDURE — 36416 COLLJ CAPILLARY BLOOD SPEC: CPT

## 2017-08-31 NOTE — PROGRESS NOTES
ANTICOAGULATION FOLLOW-UP CLINIC VISIT    Patient Name:  Susan Ferguson  Date:  8/31/2017  Contact Type:  Face to Face    SUBJECTIVE:     Patient Findings     Positives Missed doses (MIssed Sat dose last week)           OBJECTIVE    INR Protime   Date Value Ref Range Status   08/31/2017 1.9 (A) 0.86 - 1.14 Final     Factor 2 Assay   Date Value Ref Range Status   03/03/2009 19 (L) 60 - 140 % Final       ASSESSMENT / PLAN  INR assessment THER    Recheck INR In: 4 WEEKS    INR Location Clinic      Anticoagulation Summary as of 8/31/2017     INR goal 2.0-3.0   Today's INR 1.9!   Maintenance plan 7.5 mg (5 mg x 1.5) on Mon, Wed, Fri; 5 mg (5 mg x 1) all other days   Full instructions 8/31: 7.5 mg; Otherwise 7.5 mg on Mon, Wed, Fri; 5 mg all other days   Weekly total 42.5 mg   Plan last modified Socorro Bryant RN (2/9/2017)   Next INR check 9/28/2017   Priority INR   Target end date     Indications   Long-term (current) use of anticoagulants [Z79.01] [Z79.01]  Coagulation defect (HCC) [D68.9] [D68.9]         Anticoagulation Episode Summary     INR check location     Preferred lab     Send INR reminders to [facility]AG[facility]    Comments       Anticoagulation Care Providers     Provider Role Specialty Phone number    Lety Vela DO [facility] Internal Medicine [phone]            See the Encounter Report to view Anticoagulation Flowsheet and Dosing Calendar (Go to Encounters tab in chart review, and find the Anticoagulation Therapy Visit)    Dosage adjustment made based on physician directed care plan.    Lien Ray RN

## 2017-08-31 NOTE — MR AVS SNAPSHOT
Susan Ferguson   8/31/2017 11:15 AM   Anticoagulation Therapy Visit    Description:  77 year old female   Provider:   ANTICOAGULATION CLINIC   Department:   Nurse           INR as of 8/31/2017     Today's INR 1.9!      Anticoagulation Summary as of 8/31/2017     INR goal 2.0-3.0   Today's INR 1.9!   Full instructions 8/31: 7.5 mg; Otherwise 7.5 mg on Mon, Wed, Fri; 5 mg all other days   Next INR check 9/28/2017    Indications   Long-term (current) use of anticoagulants [Z79.01] [Z79.01]  Coagulation defect (HCC) [D68.9] [D68.9]         Your next Anticoagulation Clinic appointment(s)     Aug 31, 2017 11:15 AM CDT   Anticoagulation Visit with  ANTICOAGULATION CLINIC   [facility]    [address]  [place] [address]   [phone]            Sep 28, 2017 10:15 AM CDT   Anticoagulation Visit with  ANTICOAGULATION CLINIC   [facility]    [address]  [place] [address]   [phone]              Contact Numbers     Clinic Number:         August 2017 Details    Sun Mon Tue Wed Thu Fri Sat       1               2               3               4               5                 6               7               8               9               10               11               12                 13               14               15               16               17               18               19                 20               21               22               23               24               25               26                 27               28               29               30               31      7.5 mg   See details         Date Details   08/31 This INR check               How to take your warfarin dose     To take:  7.5 mg Take 1.5 of the 5 mg tablets.           September 2017 Details    Sun Mon Tue Wed Thu Fri Sat          1      7.5 mg         2      5 mg           3      5 mg         4      7.5 mg         5      5  mg         6      7.5 mg         7      5 mg         8      7.5 mg         9      5 mg           10      5 mg         11      7.5 mg         12      5 mg         13      7.5 mg         14      5 mg         15      7.5 mg         16      5 mg           17      5 mg         18      7.5 mg         19      5 mg         20      7.5 mg         21      5 mg         22      7.5 mg         23      5 mg           24      5 mg         25      7.5 mg         26      5 mg         27      7.5 mg         28            29               30                Date Details   No additional details    Date of next INR:  9/28/2017         How to take your warfarin dose     To take:  5 mg Take 1 of the 5 mg tablets.    To take:  7.5 mg Take 1.5 of the 5 mg tablets.

## 2017-09-28 ENCOUNTER — ANTICOAGULATION THERAPY VISIT (OUTPATIENT)
Dept: NURSING | Facility: CLINIC | Age: 77
End: 2017-09-28
Payer: COMMERCIAL

## 2017-09-28 DIAGNOSIS — Z79.01 LONG-TERM (CURRENT) USE OF ANTICOAGULANTS: ICD-10-CM

## 2017-09-28 DIAGNOSIS — D68.9 COAGULATION DEFECT (H): ICD-10-CM

## 2017-09-28 LAB — INR POINT OF CARE: 2.2 (ref 0.86–1.14)

## 2017-09-28 PROCEDURE — 99207 ZZC NO CHARGE NURSE ONLY: CPT

## 2017-09-28 PROCEDURE — 85610 PROTHROMBIN TIME: CPT | Mod: QW

## 2017-09-28 PROCEDURE — 36416 COLLJ CAPILLARY BLOOD SPEC: CPT

## 2017-09-28 NOTE — PROGRESS NOTES
ANTICOAGULATION FOLLOW-UP CLINIC VISIT    Patient Name:  Susan Ferguson  Date:  9/28/2017  Contact Type:  Face to Face    SUBJECTIVE:     Patient Findings     Positives No Problem Findings           OBJECTIVE    INR Protime   Date Value Ref Range Status   09/28/2017 2.2 (A) 0.86 - 1.14 Final     Factor 2 Assay   Date Value Ref Range Status   03/03/2009 19 (L) 60 - 140 % Final       ASSESSMENT / PLAN  INR assessment THER    Recheck INR In: 4 WEEKS    INR Location Clinic      Anticoagulation Summary as of 9/28/2017     INR goal 2.0-3.0   Today's INR 2.2   Maintenance plan 7.5 mg (5 mg x 1.5) on Mon, Wed, Fri; 5 mg (5 mg x 1) all other days   Full instructions 7.5 mg on Mon, Wed, Fri; 5 mg all other days   Weekly total 42.5 mg   No change documented Lien Ray RN   Plan last modified Socorro Bryant RN (2/9/2017)   Next INR check 10/26/2017   Priority INR   Target end date     Indications   Long-term (current) use of anticoagulants [Z79.01] [Z79.01]  Coagulation defect (HCC) [D68.9] [D68.9]         Anticoagulation Episode Summary     INR check location     Preferred lab     Send INR reminders to [facility] ANTICOAGUL[facility]    Comments       Anticoagulation Care Providers     Provider Role Specialty Phone number    Lety Vela DO [facility] Internal Medicine [phone]            See the Encounter Report to view Anticoagulation Flowsheet and Dosing Calendar (Go to Encounters tab in chart review, and find the Anticoagulation Therapy Visit)    Dosage adjustment made based on physician directed care plan.    Lien Ray, RN

## 2017-09-28 NOTE — MR AVS SNAPSHOT
Susan Ferguson   9/28/2017 10:15 AM   Anticoagulation Therapy Visit    Description:  77 year old female   Provider:   ANTICOAGULATION CLINIC   Department:  [facility] Nurse           INR as of 9/28/2017     Today's INR 2.2      Anticoagulation Summary as of 9/28/2017     INR goal 2.0-3.0   Today's INR 2.2   Full instructions 7.5 mg on Mon, Wed, Fri; 5 mg all other days   Next INR check 10/26/2017    Indications   Long-term (current) use of anticoagulants [Z79.01] [Z79.01]  Coagulation defect (HCC) [D68.9] [D68.9]         Your next Anticoagulation Clinic appointment(s)     Oct 26, 2017 11:15 AM CDT   Anticoagulation Visit with  ANTICOAGULATION CLINIC   [facility] ([facility])    [address]  [place] [address]   [phone]              Contact Numbers     Clinic Number:         September 2017 Details    Sun Mon Tue Wed Thu Fri Sat          1               2                 3               4               5               6               7               8               9                 10               11               12               13               14               15               16                 17               18               19               20               21               22               23                 24               25               26               27               28      5 mg   See details      29      7.5 mg         30      5 mg          Date Details   09/28 This INR check               How to take your warfarin dose     To take:  5 mg Take 1 of the 5 mg tablets.    To take:  7.5 mg Take 1.5 of the 5 mg tablets.           October 2017 Details    Sun Mon Tue Wed Thu Fri Sat     1      5 mg         2      7.5 mg         3      5 mg         4      7.5 mg         5      5 mg         6      7.5 mg         7      5 mg           8      5 mg         9      7.5 mg         10      5 mg         11      7.5 mg         12      5 mg         13      7.5 mg         14       5 mg           15      5 mg         16      7.5 mg         17      5 mg         18      7.5 mg         19      5 mg         20      7.5 mg         21      5 mg           22      5 mg         23      7.5 mg         24      5 mg         25      7.5 mg         26            27               28                 29               30               31                    Date Details   No additional details    Date of next INR:  10/26/2017         How to take your warfarin dose     To take:  5 mg Take 1 of the 5 mg tablets.    To take:  7.5 mg Take 1.5 of the 5 mg tablets.

## 2017-10-26 ENCOUNTER — ANTICOAGULATION THERAPY VISIT (OUTPATIENT)
Dept: NURSING | Facility: CLINIC | Age: 77
End: 2017-10-26
Payer: COMMERCIAL

## 2017-10-26 DIAGNOSIS — Z79.01 LONG-TERM (CURRENT) USE OF ANTICOAGULANTS: ICD-10-CM

## 2017-10-26 DIAGNOSIS — D68.9 COAGULATION DEFECT (H): ICD-10-CM

## 2017-10-26 LAB — INR POINT OF CARE: 4.1 (ref 0.86–1.14)

## 2017-10-26 PROCEDURE — 36416 COLLJ CAPILLARY BLOOD SPEC: CPT

## 2017-10-26 PROCEDURE — 85610 PROTHROMBIN TIME: CPT | Mod: QW

## 2017-10-26 PROCEDURE — 99207 ZZC NO CHARGE NURSE ONLY: CPT

## 2017-10-26 RX ORDER — WARFARIN SODIUM 5 MG/1
5-7.5 TABLET ORAL DAILY
Qty: 135 TABLET | Refills: 0 | Status: SHIPPED | OUTPATIENT
Start: 2017-10-26 | End: 2017-12-07

## 2017-10-26 NOTE — MR AVS SNAPSHOT
Susan Ferguson   10/26/2017 11:15 AM   Anticoagulation Therapy Visit    Description:  77 year old female   Provider:   ANTICOAGULATION CLINIC   Department:   Nurse           INR as of 10/26/2017     Today's INR 4.1!      Anticoagulation Summary as of 10/26/2017     INR goal 2.0-3.0   Today's INR 4.1!   Full instructions 10/26: Hold; Otherwise 7.5 mg on Mon, Wed, Fri; 5 mg all other days   Next INR check 11/8/2017    Indications   Long-term (current) use of anticoagulants [Z79.01] [Z79.01]  Coagulation defect (HCC) [D68.9] [D68.9]         Description     OK to eat a few more greens      Your next Anticoagulation Clinic appointment(s)     Oct 26, 2017 11:15 AM CDT   Anticoagulation Visit with  ANTICOAGULATION CLINIC   [facility]    [address]  [place] [address]   [phone]            Nov 08, 2017 11:30 AM CST   Anticoagulation Visit with  ANTICOAGULATION CLINIC   [facility] ([facility]    [address]  [place] [address]   [phone]              Contact Numbers     Clinic Number:         October 2017 Details    Sun Mon Tue Wed Thu Fri Sat     1               2               3               4               5               6               7                 8               9               10               11               12               13               14                 15               16               17               18               19               20               21                 22               23               24               25               26      Hold   See details      27      7.5 mg         28      5 mg           29      5 mg         30      7.5 mg         31      5 mg              Date Details   10/26 This INR check               How to take your warfarin dose     To take:  5 mg Take 1 of the 5 mg tablets.    To take:  7.5 mg Take 1.5 of the 5 mg tablets.    Hold Do not take your warfarin dose. See the  Details table to the right for additional instructions.                November 2017 Details    Sun Mon Tue Wed Thu Fri Sat        1      7.5 mg         2      5 mg         3      7.5 mg         4      5 mg           5      5 mg         6      7.5 mg         7      5 mg         8            9               10               11                 12               13               14               15               16               17               18                 19               20               21               22               23               24               25                 26               27               28               29               30                  Date Details   No additional details    Date of next INR:  11/8/2017         How to take your warfarin dose     To take:  5 mg Take 1 of the 5 mg tablets.    To take:  7.5 mg Take 1.5 of the 5 mg tablets.

## 2017-10-26 NOTE — PROGRESS NOTES
"  ANTICOAGULATION FOLLOW-UP CLINIC VISIT    Patient Name:  Susan Ferguson  Date:  10/26/2017  Contact Type:  Face to Face    SUBJECTIVE:     Patient Findings     Comments Reports \"frozen shoulder\" with aches and pain----worse lately.  Patient has also started an exercise class 3 tmes/wk.  Patient thinks that might be contributing to increased pain.   Taking more Tylenol than usual.  Concurrent use of ACETAMINOPHEN and WARFARIN may result in an increased risk of bleeding  Advised patient increase greens a little.  Patient says she \"really likes greens\" so is happy with recommendation.            OBJECTIVE    INR Protime   Date Value Ref Range Status   10/26/2017 4.1 (A) 0.86 - 1.14 Final     Factor 2 Assay   Date Value Ref Range Status   03/03/2009 19 (L) 60 - 140 % Final       ASSESSMENT / PLAN  INR assessment SUPRA    Recheck INR In: 2 WEEKS    INR Location Clinic      Anticoagulation Summary as of 10/26/2017     INR goal 2.0-3.0   Today's INR 4.1!   Maintenance plan 7.5 mg (5 mg x 1.5) on Mon, Wed, Fri; 5 mg (5 mg x 1) all other days   Full instructions 10/26: Hold; Otherwise 7.5 mg on Mon, Wed, Fri; 5 mg all other days   Weekly total 42.5 mg   Plan last modified Socorro Bryant RN (2/9/2017)   Next INR check 11/8/2017   Priority INR   Target end date     Indications   Long-term (current) use of anticoagulants [Z79.01] [Z79.01]  Coagulation defect (HCC) [D68.9] [D68.9]         Anticoagulation Episode Summary     INR check location     Preferred lab     Send INR reminders to CS ANTICOAGULATION    Comments       Anticoagulation Care Providers     Provider Role Specialty Phone number    VelaLety davey DO [facility] Internal Medicine [phone]            See the Encounter Report to view Anticoagulation Flowsheet and Dosing Calendar (Go to Encounters tab in chart review, and find the Anticoagulation Therapy Visit)    Dosage adjustment made based on physician directed care plan.    Lien Ray RN        "

## 2017-11-08 ENCOUNTER — ANTICOAGULATION THERAPY VISIT (OUTPATIENT)
Dept: NURSING | Facility: CLINIC | Age: 77
End: 2017-11-08
Payer: COMMERCIAL

## 2017-11-08 DIAGNOSIS — D68.9 COAGULATION DEFECT (H): ICD-10-CM

## 2017-11-08 DIAGNOSIS — Z79.01 LONG-TERM (CURRENT) USE OF ANTICOAGULANTS: ICD-10-CM

## 2017-11-08 LAB — INR POINT OF CARE: 3.6 (ref 0.86–1.14)

## 2017-11-08 PROCEDURE — 85610 PROTHROMBIN TIME: CPT | Mod: QW

## 2017-11-08 PROCEDURE — 99207 ZZC NO CHARGE NURSE ONLY: CPT

## 2017-11-08 PROCEDURE — 36416 COLLJ CAPILLARY BLOOD SPEC: CPT

## 2017-11-08 NOTE — MR AVS SNAPSHOT
Susan Ferguson   11/8/2017 11:30 AM   Anticoagulation Therapy Visit    Description:  77 year old female   Provider:   ANTICOAGULATION CLINIC   Department:  Cs Nurse           INR as of 11/8/2017     Today's INR 3.6!      Anticoagulation Summary as of 11/8/2017     INR goal 2.0-3.0   Today's INR 3.6!   Full instructions 7.5 mg on Mon, Fri; 5 mg all other days   Next INR check 11/22/2017    Indications   Long-term (current) use of anticoagulants [Z79.01] [Z79.01]  Coagulation defect (HCC) [D68.9] [D68.9]         Your next Anticoagulation Clinic appointment(s)     Nov 22, 2017 11:15 AM CST   Anticoagulation Visit with  ANTICOAGULATION CLINIC   [facility] [place] ([facility])    [address]  [place] [address]   [phone]              Contact Numbers     Clinic Number:         November 2017 Details    Sun Mon Tue Wed Thu Fri Sat        1               2               3               4                 5               6               7               8      5 mg   See details      9      5 mg         10      7.5 mg         11      5 mg           12      5 mg         13      7.5 mg         14      5 mg         15      5 mg         16      5 mg         17      7.5 mg         18      5 mg           19      5 mg         20      7.5 mg         21      5 mg         22            23               24               25                 26               27               28               29               30                  Date Details   11/08 This INR check       Date of next INR:  11/22/2017         How to take your warfarin dose     To take:  5 mg Take 1 of the 5 mg tablets.    To take:  7.5 mg Take 1.5 of the 5 mg tablets.

## 2017-11-08 NOTE — PROGRESS NOTES
ANTICOAGULATION FOLLOW-UP CLINIC VISIT    Patient Name:  Susan Ferguson  Date:  11/8/2017  Contact Type:  Face to Face    SUBJECTIVE:     Patient Findings     Positives Inflammation    Comments Shoulder pain.           OBJECTIVE    INR Protime   Date Value Ref Range Status   11/08/2017 3.6 (A) 0.86 - 1.14 Final     Factor 2 Assay   Date Value Ref Range Status   03/03/2009 19 (L) 60 - 140 % Final       ASSESSMENT / PLAN  INR assessment SUPRA    Recheck INR In: 2 WEEKS    INR Location Clinic      Anticoagulation Summary as of 11/8/2017     INR goal 2.0-3.0   Today's INR 3.6!   Maintenance plan 7.5 mg (5 mg x 1.5) on Mon, Fri; 5 mg (5 mg x 1) all other days   Full instructions 7.5 mg on Mon, Fri; 5 mg all other days   Weekly total 40 mg   Plan last modified Alyce Heller, RN (11/8/2017)   Next INR check 11/22/2017   Priority INR   Target end date     Indications   Long-term (current) use of anticoagulants [Z79.01] [Z79.01]  Coagulation defect (HCC) [D68.9] [D68.9]         Anticoagulation Episode Summary     INR check location     Preferred lab     Send INR reminders to CS ANTICOAGULATION    Comments       Anticoagulation Care Providers     Provider Role Specialty Phone number    Lety Vela,  [facility] Internal Medicine [phone]            See the Encounter Report to view Anticoagulation Flowsheet and Dosing Calendar (Go to Encounters tab in chart review, and find the Anticoagulation Therapy Visit)    Dosage adjustment made based on physician directed care plan. Recent supras X 2. Quite a bit of shoulder pain. Using Tylenol. Weekly decrease and recheck 2 weeks.      Alyce Heller, RN

## 2017-11-22 ENCOUNTER — TELEPHONE (OUTPATIENT)
Dept: FAMILY MEDICINE | Facility: CLINIC | Age: 77
End: 2017-11-22

## 2017-11-22 ENCOUNTER — ANTICOAGULATION THERAPY VISIT (OUTPATIENT)
Dept: NURSING | Facility: CLINIC | Age: 77
End: 2017-11-22
Payer: COMMERCIAL

## 2017-11-22 DIAGNOSIS — Z79.01 LONG-TERM (CURRENT) USE OF ANTICOAGULANTS: Primary | ICD-10-CM

## 2017-11-22 DIAGNOSIS — Z79.01 LONG-TERM (CURRENT) USE OF ANTICOAGULANTS: ICD-10-CM

## 2017-11-22 DIAGNOSIS — Z86.718 HISTORY OF DEEP VENOUS THROMBOSIS: ICD-10-CM

## 2017-11-22 DIAGNOSIS — D68.51 FACTOR V LEIDEN (H): Chronic | ICD-10-CM

## 2017-11-22 DIAGNOSIS — D68.9 COAGULATION DEFECT (H): ICD-10-CM

## 2017-11-22 LAB — INR POINT OF CARE: 3.8 (ref 0.86–1.14)

## 2017-11-22 PROCEDURE — 85610 PROTHROMBIN TIME: CPT | Mod: QW

## 2017-11-22 PROCEDURE — 36416 COLLJ CAPILLARY BLOOD SPEC: CPT

## 2017-11-22 PROCEDURE — 99207 ZZC NO CHARGE NURSE ONLY: CPT

## 2017-11-22 NOTE — PROGRESS NOTES
ANTICOAGULATION FOLLOW-UP CLINIC VISIT    Patient Name:  Susan Ferguson  Date:  11/22/2017  Contact Type:  Face to Face    SUBJECTIVE:     Patient Findings     Positives Change in diet/appetite (Has increased intake of greens (Broccoli, Brussel Sprouts, and Lettuce))    Comments Other complaints - Shoulder pain for past 6 months  Inflammation - Should pain for about 6 months  Increase in stress as well (has a family member with terminal cancer)  About 3 months ago, changed from Warfarin/Coumadin to Jantoven   Has a new protein shake mix - looks at ingredients for content of Vitamin K - none noted per pt           OBJECTIVE    INR Protime   Date Value Ref Range Status   11/22/2017 3.8 (A) 0.86 - 1.14 Final     Factor 2 Assay   Date Value Ref Range Status   03/03/2009 19 (L) 60 - 140 % Final       ASSESSMENT / PLAN  INR assessment SUPRA    Recheck INR In: 2 WEEKS    INR Location Clinic      Anticoagulation Summary as of 11/22/2017     INR goal 2.0-3.0   Today's INR 3.8!   Maintenance plan 7.5 mg (5 mg x 1.5) on Mon, Fri; 5 mg (5 mg x 1) all other days   Full instructions 11/22: 2.5 mg; 11/27: 5 mg; 12/1: 5 mg; 12/4: 5 mg; Otherwise 7.5 mg on Mon, Fri; 5 mg all other days   Weekly total 40 mg   Plan last modified Alyce Heller, RN (11/8/2017)   Next INR check 12/6/2017   Priority INR   Target end date     Indications   Long-term (current) use of anticoagulants [Z79.01] [Z79.01]  Coagulation defect (HCC) [D68.9] [D68.9]         Anticoagulation Episode Summary     INR check location     Preferred lab     Send INR reminders to CS ANTICOAGULATION    Comments       Anticoagulation Care Providers     Provider Role Specialty Phone number    Lety Vela DO [facility] Internal Medicine [phone]            See the Encounter Report to view Anticoagulation Flowsheet and Dosing Calendar (Go to Encounters tab in chart review, and find the Anticoagulation Therapy Visit)    Dosage adjustment made based on physician  directed care plan.  Patient has multiple concerns of pain, stress, etc.  Even with recent dose adjustment - INR still supra  Adjusted weekly total again from 40mg/wk to 35mg/wk - 12.5% decrease  Pt will continue eating greens as well - has increased them, but has not shown effect on INR yet    Latisha Coe RN

## 2017-11-22 NOTE — TELEPHONE ENCOUNTER
Dr Vela,  Patient due for INR Referral Renewal  Order pended  Please sign then close  Thanks,  Latisha NEWSOME RN

## 2017-11-22 NOTE — MR AVS SNAPSHOT
Susan Ferguson   11/22/2017 11:15 AM   Anticoagulation Therapy Visit    Description:  77 year old female   Provider:   ANTICOAGULATION CLINIC   Department:  Cs Nurse           INR as of 11/22/2017     Today's INR 3.8!      Anticoagulation Summary as of 11/22/2017     INR goal 2.0-3.0   Today's INR 3.8!   Full instructions 11/22: 2.5 mg; 11/27: 5 mg; 12/1: 5 mg; 12/4: 5 mg; Otherwise 7.5 mg on Mon, Fri; 5 mg all other days   Next INR check 12/6/2017    Indications   Long-term (current) use of anticoagulants [Z79.01] [Z79.01]  Coagulation defect (HCC) [D68.9] [D68.9]         Your next Anticoagulation Clinic appointment(s)     Dec 07, 2017 11:15 AM CST   Anticoagulation Visit with  ANTICOAGULATION CLINIC   [facility]    [address]  [place] [address]   [phone]              Contact Numbers     Clinic Number:         November 2017 Details    Sun Mon Tue Wed Thu Fri Sat        1               2               3               4                 5               6               7               8               9               10               11                 12               13               14               15               16               17               18                 19               20               21               22      2.5 mg   See details      23      5 mg         24      7.5 mg         25      5 mg           26      5 mg         27      5 mg         28      5 mg         29      5 mg         30      5 mg            Date Details   11/22 This INR check               How to take your warfarin dose     To take:  2.5 mg Take 0.5 of a 5 mg tablet.    To take:  5 mg Take 1 of the 5 mg tablets.    To take:  7.5 mg Take 1.5 of the 5 mg tablets.           December 2017 Details    Sun Mon Tue Wed Thu Fri Sat          1      5 mg         2      5 mg           3      5 mg         4      5 mg         5      5 mg         6            7               8                9                 10               11               12               13               14               15               16                 17               18               19               20               21               22               23                 24               25               26               27               28               29               30                 31                      Date Details   No additional details    Date of next INR:  12/6/2017         How to take your warfarin dose     To take:  5 mg Take 1 of the 5 mg tablets.

## 2017-11-24 NOTE — TELEPHONE ENCOUNTER
Referral placed  Please have her schedule f/u OV with me as I haven't seen her since 2015 (and I believe she saw Dr. Barahona as PCP for awhile)

## 2017-11-24 NOTE — TELEPHONE ENCOUNTER
Called and talked with patient.   Patient will coordinate an OV with PCP at next INR appointment x 2 wks.     Lien VALLEJO RN,BSN

## 2017-12-07 ENCOUNTER — ANTICOAGULATION THERAPY VISIT (OUTPATIENT)
Dept: NURSING | Facility: CLINIC | Age: 77
End: 2017-12-07
Payer: COMMERCIAL

## 2017-12-07 DIAGNOSIS — Z79.01 LONG-TERM (CURRENT) USE OF ANTICOAGULANTS: ICD-10-CM

## 2017-12-07 DIAGNOSIS — D68.9 COAGULATION DEFECT (H): ICD-10-CM

## 2017-12-07 LAB — INR POINT OF CARE: 1.7 (ref 0.86–1.14)

## 2017-12-07 PROCEDURE — 85610 PROTHROMBIN TIME: CPT | Mod: QW

## 2017-12-07 PROCEDURE — 99207 ZZC NO CHARGE NURSE ONLY: CPT

## 2017-12-07 PROCEDURE — 36416 COLLJ CAPILLARY BLOOD SPEC: CPT

## 2017-12-07 RX ORDER — WARFARIN SODIUM 5 MG/1
5-7.5 TABLET ORAL DAILY
Qty: 135 TABLET | Refills: 0 | Status: SHIPPED | OUTPATIENT
Start: 2017-12-07 | End: 2017-12-13

## 2017-12-07 RX ORDER — WARFARIN SODIUM 5 MG/1
5-7.5 TABLET ORAL DAILY
Qty: 135 TABLET | Refills: 0 | Status: SHIPPED | OUTPATIENT
Start: 2017-12-07 | End: 2017-12-07

## 2017-12-07 NOTE — PROGRESS NOTES
ANTICOAGULATION FOLLOW-UP CLINIC VISIT    Patient Name:  Susan Ferguson  Date:  12/7/2017  Contact Type:  Face to Face    SUBJECTIVE:     Patient Findings     Comments Most recent RX was Warfarin, not Jantoven.  Patient prefers Jantoven and INRs since last RX of Warfarin have not been in therapeutic range .   Sent new RX to local pharmacy today for Jantoven.            OBJECTIVE    INR Protime   Date Value Ref Range Status   12/07/2017 1.7 (A) 0.86 - 1.14 Final     Factor 2 Assay   Date Value Ref Range Status   03/03/2009 19 (L) 60 - 140 % Final       ASSESSMENT / PLAN  INR assessment SUB    Recheck INR In: 2 WEEKS    INR Location Clinic      Anticoagulation Summary as of 12/7/2017     INR goal 2.0-3.0   Today's INR 1.7!   Maintenance plan 7.5 mg (5 mg x 1.5) on Mon, Fri; 5 mg (5 mg x 1) all other days   Full instructions 12/7: 7.5 mg; Otherwise 7.5 mg on Mon, Fri; 5 mg all other days   Weekly total 40 mg   Plan last modified Alyce Heller RN (11/8/2017)   Next INR check 12/21/2017   Priority INR   Target end date     Indications   Long-term (current) use of anticoagulants [Z79.01] [Z79.01]  Coagulation defect (HCC) [D68.9] [D68.9]         Anticoagulation Episode Summary     INR check location     Preferred lab     Send INR reminders to [facility] ANTICOAGULATION    Comments       Anticoagulation Care Providers     Provider Role Specialty Phone number    Lety VelaDO [facility] Internal Medicine [phone]            See the Encounter Report to view Anticoagulation Flowsheet and Dosing Calendar (Go to Encounters tab in chart review, and find the Anticoagulation Therapy Visit)    Dosage adjustment made based on physician directed care plan.    Lien Ray, RN

## 2017-12-07 NOTE — MR AVS SNAPSHOT
Susan Ferguson   12/7/2017 11:15 AM   Anticoagulation Therapy Visit    Description:  77 year old female   Provider:   ANTICOAGULATION CLINIC   Department:  Cs Nurse           INR as of 12/7/2017     Today's INR 1.7!      Anticoagulation Summary as of 12/7/2017     INR goal 2.0-3.0   Today's INR 1.7!   Full instructions 12/7: 7.5 mg; Otherwise 7.5 mg on Mon, Fri; 5 mg all other days   Next INR check 12/21/2017    Indications   Long-term (current) use of anticoagulants [Z79.01] [Z79.01]  Coagulation defect (HCC) [D68.9] [D68.9]         Your next Anticoagulation Clinic appointment(s)     Dec 21, 2017  2:00 PM CST   Anticoagulation Visit with  ANTICOAGULATION CLINIC   [facility] [place] ([facility])    [address]  [place] [address]   [phone]              Contact Numbers     Clinic Number:         December 2017 Details    Sun Mon Tue Wed Thu Fri Sat          1               2                 3               4               5               6               7      7.5 mg   See details      8      7.5 mg         9      5 mg           10      5 mg         11      7.5 mg         12      5 mg         13      5 mg         14      5 mg         15      7.5 mg         16      5 mg           17      5 mg         18      7.5 mg         19      5 mg         20      5 mg         21            22               23                 24               25               26               27               28               29               30                 31                      Date Details   12/07 This INR check       Date of next INR:  12/21/2017         How to take your warfarin dose     To take:  5 mg Take 1 of the 5 mg tablets.    To take:  7.5 mg Take 1.5 of the 5 mg tablets.

## 2017-12-13 DIAGNOSIS — Z79.01 LONG-TERM (CURRENT) USE OF ANTICOAGULANTS: ICD-10-CM

## 2017-12-13 NOTE — TELEPHONE ENCOUNTER
See 12/7/17 acc visit - patient prefers brand only Jantoven.  New Rx was sent but was not marked CRISTINE.  New pended.    RT Susy (R)

## 2017-12-14 RX ORDER — WARFARIN SODIUM 5 MG/1
5-7.5 TABLET ORAL DAILY
Qty: 135 TABLET | Refills: 0 | Status: SHIPPED | OUTPATIENT
Start: 2017-12-14 | End: 2018-01-11

## 2017-12-20 ENCOUNTER — ANTICOAGULATION THERAPY VISIT (OUTPATIENT)
Dept: NURSING | Facility: CLINIC | Age: 77
End: 2017-12-20
Payer: COMMERCIAL

## 2017-12-20 DIAGNOSIS — Z79.01 LONG-TERM (CURRENT) USE OF ANTICOAGULANTS: ICD-10-CM

## 2017-12-20 DIAGNOSIS — D68.9 COAGULATION DEFECT (H): ICD-10-CM

## 2017-12-20 LAB — INR POINT OF CARE: 3.3 (ref 0.86–1.14)

## 2017-12-20 PROCEDURE — 99207 ZZC NO CHARGE NURSE ONLY: CPT

## 2017-12-20 PROCEDURE — 36416 COLLJ CAPILLARY BLOOD SPEC: CPT

## 2017-12-20 PROCEDURE — 85610 PROTHROMBIN TIME: CPT | Mod: QW

## 2017-12-20 NOTE — PROGRESS NOTES
ANTICOAGULATION FOLLOW-UP CLINIC VISIT    Patient Name:  Susan Ferguson  Date:  12/20/2017  Contact Type:  Face to Face    SUBJECTIVE:     Patient Findings     Positives No Problem Findings    Comments Patient was going to switch to Jantoven but [employer] has to order so won't be available until after 12/28.  Continues to take Warfarin           OBJECTIVE    INR Protime   Date Value Ref Range Status   12/20/2017 3.3 (A) 0.86 - 1.14 Final     Factor 2 Assay   Date Value Ref Range Status   03/03/2009 19 (L) 60 - 140 % Final       ASSESSMENT / PLAN  INR assessment SUPRA    Recheck INR In: 3 WEEKS    INR Location Clinic      Anticoagulation Summary as of 12/20/2017     INR goal 2.0-3.0   Today's INR 3.3!   Maintenance plan 7.5 mg (5 mg x 1.5) on Mon; 5 mg (5 mg x 1) all other days   Full instructions 7.5 mg on Mon; 5 mg all other days   Weekly total 37.5 mg   Plan last modified Zenaida Mclean RN (12/20/2017)   Next INR check 1/11/2018   Priority INR   Target end date     Indications   Long-term (current) use of anticoagulants [Z79.01] [Z79.01]  Coagulation defect (HCC) [D68.9] [D68.9]         Anticoagulation Episode Summary     INR check location     Preferred lab     Send INR reminders to CS ANTICOAGULATION    Comments       Anticoagulation Care Providers     Provider Role Specialty Phone number    Lety VelaDO [facility] Internal Medicine [phone]            See the Encounter Report to view Anticoagulation Flowsheet and Dosing Calendar (Go to Encounters tab in chart review, and find the Anticoagulation Therapy Visit)    Dosage adjustment made based on physician directed care plan.    Zenaida Mclean RN

## 2017-12-20 NOTE — MR AVS SNAPSHOT
Susan Ferguson   12/20/2017 8:45 AM   Anticoagulation Therapy Visit    Description:  77 year old female   Provider:   ANTICOAGULATION CLINIC   Department:   Nurse           INR as of 12/20/2017     Today's INR 3.3!      Anticoagulation Summary as of 12/20/2017     INR goal 2.0-3.0   Today's INR 3.3!   Full instructions 7.5 mg on Mon; 5 mg all other days   Next INR check 1/11/2018    Indications   Long-term (current) use of anticoagulants [Z79.01] [Z79.01]  Coagulation defect (HCC) [D68.9] [D68.9]         Your next Anticoagulation Clinic appointment(s)     Dec 20, 2017  8:45 AM CST   Anticoagulation Visit with  ANTICOAGULATION CLINIC   [facility]    [address]  [place] [address]   [phone]            Jan 11, 2018 11:00 AM CST   Anticoagulation Visit with  ANTICOAGULATION CLINIC   [facility]    [address]  [place] [address]   [phone]              Contact Numbers     Clinic Number:         December 2017 Details    Sun Mon Tue Wed Thu Fri Sat          1               2                 3               4               5               6               7               8               9                 10               11               12               13               14               15               16                 17               18               19               20      5 mg   See details      21      5 mg         22      5 mg         23      5 mg           24      5 mg         25      7.5 mg         26      5 mg         27      5 mg         28      5 mg         29      5 mg         30      5 mg           31      5 mg                Date Details   12/20 This INR check               How to take your warfarin dose     To take:  5 mg Take 1 of the 5 mg tablets.    To take:  7.5 mg Take 1.5 of the 5 mg tablets.           January 2018 Details    Sun Mon Tue Wed Thu Fri Sat      1      7.5 mg         2      5  mg         3      5 mg         4      5 mg         5      5 mg         6      5 mg           7      5 mg         8      7.5 mg         9      5 mg         10      5 mg         11            12               13                 14               15               16               17               18               19               20                 21               22               23               24               25               26               27                 28               29               30               31                   Date Details   No additional details    Date of next INR:  1/11/2018         How to take your warfarin dose     To take:  5 mg Take 1 of the 5 mg tablets.    To take:  7.5 mg Take 1.5 of the 5 mg tablets.

## 2018-01-11 ENCOUNTER — ANTICOAGULATION THERAPY VISIT (OUTPATIENT)
Dept: NURSING | Facility: CLINIC | Age: 78
End: 2018-01-11
Payer: COMMERCIAL

## 2018-01-11 DIAGNOSIS — Z79.01 LONG-TERM (CURRENT) USE OF ANTICOAGULANTS: ICD-10-CM

## 2018-01-11 DIAGNOSIS — D68.9 COAGULATION DEFECT (H): ICD-10-CM

## 2018-01-11 LAB — INR POINT OF CARE: 2.4 (ref 0.86–1.14)

## 2018-01-11 PROCEDURE — 99207 ZZC NO CHARGE NURSE ONLY: CPT

## 2018-01-11 PROCEDURE — 85610 PROTHROMBIN TIME: CPT | Mod: QW

## 2018-01-11 PROCEDURE — 36416 COLLJ CAPILLARY BLOOD SPEC: CPT

## 2018-01-11 RX ORDER — WARFARIN SODIUM 5 MG/1
5-7.5 TABLET ORAL DAILY
Qty: 135 TABLET | Refills: 0 | Status: SHIPPED | OUTPATIENT
Start: 2018-01-11 | End: 2018-01-16

## 2018-01-11 NOTE — PROGRESS NOTES
ANTICOAGULATION FOLLOW-UP CLINIC VISIT    Patient Name:  Susan Ferguson  Date:  1/11/2018  Contact Type:  Face to Face    SUBJECTIVE:     Patient Findings     Positives No Problem Findings           OBJECTIVE    INR Protime   Date Value Ref Range Status   01/11/2018 2.4 (A) 0.86 - 1.14 Final     Factor 2 Assay   Date Value Ref Range Status   03/03/2009 19 (L) 60 - 140 % Final       ASSESSMENT / PLAN  INR assessment THER    Recheck INR In: 4 WEEKS    INR Location Clinic      Anticoagulation Summary as of 1/11/2018     INR goal 2.0-3.0   Today's INR 2.4   Maintenance plan 7.5 mg (5 mg x 1.5) on Mon; 5 mg (5 mg x 1) all other days   Full instructions 7.5 mg on Mon; 5 mg all other days   Weekly total 37.5 mg   No change documented Lien Ray RN   Plan last modified Zenaida Mclean RN (12/20/2017)   Next INR check 2/8/2018   Priority INR   Target end date     Indications   Long-term (current) use of anticoagulants [Z79.01] [Z79.01]  Coagulation defect (HCC) [D68.9] [D68.9]         Anticoagulation Episode Summary     INR check location     Preferred lab     Send INR reminders to CS ANTICOAGULATION    Comments       Anticoagulation Care Providers     Provider Role Specialty Phone number    Lety Vela DO [facility] Internal Medicine [phone]            See the Encounter Report to view Anticoagulation Flowsheet and Dosing Calendar (Go to Encounters tab in chart review, and find the Anticoagulation Therapy Visit)    Dosage adjustment made based on physician directed care plan.    Lien Ray RN

## 2018-01-11 NOTE — MR AVS SNAPSHOT
Susan Ferguson   1/11/2018 11:00 AM   Anticoagulation Therapy Visit    Description:  77 year old female   Provider:   ANTICOAGULATION CLINIC   Department:  Cs Nurse           INR as of 1/11/2018     Today's INR 2.4      Anticoagulation Summary as of 1/11/2018     INR goal 2.0-3.0   Today's INR 2.4   Full instructions 7.5 mg on Mon; 5 mg all other days   Next INR check 2/8/2018    Indications   Long-term (current) use of anticoagulants [Z79.01] [Z79.01]  Coagulation defect (HCC) [D68.9] [D68.9]         Your next Anticoagulation Clinic appointment(s)     Feb 08, 2018 10:45 AM CST   Anticoagulation Visit with  ANTICOAGULATION CLINIC   [facility]    [address]  [place] [address]   [phone]              Contact Numbers     Clinic Number:         January 2018 Details    Sun Mon Tue Wed Thu Fri Sat      1               2               3               4               5               6                 7               8               9               10               11      5 mg   See details      12      5 mg         13      5 mg           14      5 mg         15      7.5 mg         16      5 mg         17      5 mg         18      5 mg         19      5 mg         20      5 mg           21      5 mg         22      7.5 mg         23      5 mg         24      5 mg         25      5 mg         26      5 mg         27      5 mg           28      5 mg         29      7.5 mg         30      5 mg         31      5 mg             Date Details   01/11 This INR check               How to take your warfarin dose     To take:  5 mg Take 1 of the 5 mg tablets.    To take:  7.5 mg Take 1.5 of the 5 mg tablets.           February 2018 Details    Sun Mon Tue Wed Thu Fri Sat         1      5 mg         2      5 mg         3      5 mg           4      5 mg         5      7.5 mg         6      5 mg         7      5 mg         8            9               10                 11                12               13               14               15               16               17                 18               19               20               21               22               23               24                 25               26               27               28                   Date Details   No additional details    Date of next INR:  2/8/2018         How to take your warfarin dose     To take:  5 mg Take 1 of the 5 mg tablets.    To take:  7.5 mg Take 1.5 of the 5 mg tablets.

## 2018-01-16 ENCOUNTER — OFFICE VISIT (OUTPATIENT)
Dept: FAMILY MEDICINE | Facility: CLINIC | Age: 78
End: 2018-01-16
Payer: COMMERCIAL

## 2018-01-16 VITALS
TEMPERATURE: 98.5 F | DIASTOLIC BLOOD PRESSURE: 73 MMHG | HEIGHT: 64 IN | HEART RATE: 80 BPM | OXYGEN SATURATION: 94 % | BODY MASS INDEX: 37.36 KG/M2 | SYSTOLIC BLOOD PRESSURE: 170 MMHG | WEIGHT: 218.8 LBS

## 2018-01-16 DIAGNOSIS — E04.1 THYROID NODULE: ICD-10-CM

## 2018-01-16 DIAGNOSIS — Z23 NEED FOR VACCINATION: ICD-10-CM

## 2018-01-16 DIAGNOSIS — E66.812 CLASS 2 OBESITY WITHOUT SERIOUS COMORBIDITY WITH BODY MASS INDEX (BMI) OF 37.0 TO 37.9 IN ADULT, UNSPECIFIED OBESITY TYPE: ICD-10-CM

## 2018-01-16 DIAGNOSIS — Z00.01 ENCOUNTER FOR PREVENTATIVE ADULT HEALTH CARE EXAM WITH ABNORMAL FINDINGS: Primary | ICD-10-CM

## 2018-01-16 DIAGNOSIS — Z79.01 LONG-TERM (CURRENT) USE OF ANTICOAGULANTS: ICD-10-CM

## 2018-01-16 DIAGNOSIS — L82.1 SEBORRHEIC KERATOSIS: ICD-10-CM

## 2018-01-16 DIAGNOSIS — B35.1 ONYCHOMYCOSIS: ICD-10-CM

## 2018-01-16 DIAGNOSIS — R79.89 LOW TSH LEVEL: ICD-10-CM

## 2018-01-16 DIAGNOSIS — I10 ESSENTIAL HYPERTENSION: ICD-10-CM

## 2018-01-16 DIAGNOSIS — D68.51 FACTOR V LEIDEN (H): Chronic | ICD-10-CM

## 2018-01-16 DIAGNOSIS — M85.80 OSTEOPENIA, UNSPECIFIED LOCATION: Chronic | ICD-10-CM

## 2018-01-16 LAB
DEPRECATED CALCIDIOL+CALCIFEROL SERPL-MC: 33 UG/L (ref 20–75)
ERYTHROCYTE [DISTWIDTH] IN BLOOD BY AUTOMATED COUNT: 13.4 % (ref 10–15)
HCT VFR BLD AUTO: 44.4 % (ref 35–47)
HGB BLD-MCNC: 14.8 G/DL (ref 11.7–15.7)
MCH RBC QN AUTO: 30.2 PG (ref 26.5–33)
MCHC RBC AUTO-ENTMCNC: 33.3 G/DL (ref 31.5–36.5)
MCV RBC AUTO: 91 FL (ref 78–100)
PLATELET # BLD AUTO: 272 10E9/L (ref 150–450)
RBC # BLD AUTO: 4.9 10E12/L (ref 3.8–5.2)
WBC # BLD AUTO: 5.2 10E9/L (ref 4–11)

## 2018-01-16 PROCEDURE — 90670 PCV13 VACCINE IM: CPT | Performed by: INTERNAL MEDICINE

## 2018-01-16 PROCEDURE — G0009 ADMIN PNEUMOCOCCAL VACCINE: HCPCS | Performed by: INTERNAL MEDICINE

## 2018-01-16 PROCEDURE — 80061 LIPID PANEL: CPT | Performed by: INTERNAL MEDICINE

## 2018-01-16 PROCEDURE — 99397 PER PM REEVAL EST PAT 65+ YR: CPT | Mod: 25 | Performed by: INTERNAL MEDICINE

## 2018-01-16 PROCEDURE — 80053 COMPREHEN METABOLIC PANEL: CPT | Performed by: INTERNAL MEDICINE

## 2018-01-16 PROCEDURE — 84439 ASSAY OF FREE THYROXINE: CPT | Performed by: INTERNAL MEDICINE

## 2018-01-16 PROCEDURE — 36415 COLL VENOUS BLD VENIPUNCTURE: CPT | Performed by: INTERNAL MEDICINE

## 2018-01-16 PROCEDURE — 84443 ASSAY THYROID STIM HORMONE: CPT | Performed by: INTERNAL MEDICINE

## 2018-01-16 PROCEDURE — 99213 OFFICE O/P EST LOW 20 MIN: CPT | Mod: 25 | Performed by: INTERNAL MEDICINE

## 2018-01-16 PROCEDURE — 82306 VITAMIN D 25 HYDROXY: CPT | Performed by: INTERNAL MEDICINE

## 2018-01-16 PROCEDURE — 85027 COMPLETE CBC AUTOMATED: CPT | Performed by: INTERNAL MEDICINE

## 2018-01-16 RX ORDER — WARFARIN SODIUM 5 MG/1
5-7.5 TABLET ORAL DAILY
Qty: 135 TABLET | Refills: 3 | Status: SHIPPED | OUTPATIENT
Start: 2018-01-16 | End: 2019-05-23

## 2018-01-16 RX ORDER — VITAMIN E 268 MG
400 CAPSULE ORAL 2 TIMES DAILY
COMMUNITY
End: 2020-07-29

## 2018-01-16 NOTE — PATIENT INSTRUCTIONS
"Lab work today    Schedule with the following folks:  1) Please call to schedule your thyroid ultrasound at [facility]: [phone]  2) Call [facility] to ask for a new eye doctor to do a basic eye exam and they that doctor can refer you to an eyelid surgeon to discuss your \"seborrheic keratosis\" on your eyelid  3) Schedule with nutritionist at our clinic if you wish to discuss weight loss (check with [facility] on cost if you wish)  4) Consider seeing a local podiatrist such as Gabby in [place] for your nail trimming - [phone]    Ask INR nurse to check your blood pressure next visit    Flu shot and pneumonia vaccine recommended today    Schedule mammogram for summer 2018 as planned    Follow up in about 1 year or sooner as needed based on the above results     Preventive Health Recommendations  Female Ages 65 +    Yearly exam:     See your health care provider every year in order to  o Review health changes.   o Discuss preventive care.    o Review your medicines if your doctor has prescribed any.      You no longer need a yearly Pap test unless you've had an abnormal Pap test in the past 10 years. If you have vaginal symptoms, such as bleeding or discharge, be sure to talk with your provider about a Pap test.      Every 1 to 2 years, have a mammogram.  If you are over 69, talk with your health care provider about whether or not you want to continue having screening mammograms.      Every 10 years, have a colonoscopy. Or, have a yearly FIT test (stool test). These exams will check for colon cancer.       Have a cholesterol test every 5 years, or more often if your doctor advises it.       Have a diabetes test (fasting glucose) every three years. If you are at risk for diabetes, you should have this test more often.       At age 65, have a bone density scan (DEXA) to check for osteoporosis (brittle bone disease).    Shots:    Get a flu shot each year.    Get a tetanus shot every 10 years.    Talk to your " doctor about your pneumonia vaccines. There are now two you should receive - Pneumovax (PPSV 23) and Prevnar (PCV 13).    Talk to your doctor about the shingles vaccine.    Talk to your doctor about the hepatitis B vaccine.    Nutrition:     Eat at least 5 servings of fruits and vegetables each day.      Eat whole-grain bread, whole-wheat pasta and brown rice instead of white grains and rice.      Talk to your provider about Calcium and Vitamin D.     Lifestyle    Exercise at least 150 minutes a week (30 minutes a day, 5 days a week). This will help you control your weight and prevent disease.      Limit alcohol to one drink per day.      No smoking.       Wear sunscreen to prevent skin cancer.       See your dentist twice a year for an exam and cleaning.      See your eye doctor every 1 to 2 years to screen for conditions such as glaucoma, macular degeneration and cataracts.

## 2018-01-16 NOTE — PROGRESS NOTES
"  SUBJECTIVE:   Susan Ferguson is a 77 year old female who presents for Preventive Visit.  Are you in the first 12 months of your Medicare Part B coverage?  No    Healthy Habits:    Do you get at least three servings of calcium containing foods daily (dairy, green leafy vegetables, etc.)? Yes     Amount of exercise or daily activities, outside of work: 3 day(s) per week    Problems taking medications regularly No    Medication side effects: No    Have you had an eye exam in the past two years? Yes     Do you see a dentist twice per year? Has dentures    Do you have sleep apnea, excessive snoring or daytime drowsiness? No       Ability to successfully perform activities of daily living: Yes, no assistance needed    Home safety:  none identified     Hearing impairment: No    Fall risk:  Fallen 2 or more times in the past year?: No  Any fall with injury in the past year?: No    COGNITIVE SCREEN  1) Repeat 3 items (Banana, Sunrise, Chair)    2) Clock draw: NORMAL  3) 3 item recall: Recalls 3 objects  Results: 3 items recalled: COGNITIVE IMPAIRMENT LESS LIKELY    Mini-CogTM Copyright S Tad. Licensed by the author for use in [place] [employer]; reprinted with permission (stalin@[facility]). All rights reserved.      Susan is here for her annual exam. I haven't seen her in quite awhile. No new health events thankfully nor acute concerns today. Due for labs. Still lives at [facility] on the 4th floor. Has a daughter named Zenaida. Susan is a retired . She does spend some time googling and shopping on-line.   No falls in the past year but she takes it slow and uses a walker. She finds it beneficial to go to the pool at [facility] once per week for balance exercises in the pool.  Has good calcium in diet and vitamin D; h/o past fall and fracture a few years ago.  Weight is stable but she wants to work on weight loss. Feels she has good nutrition.   H/o being told she has \"white " "coat HTN\" and as she has told me before, when she was on BP medication her BP bottomed out and had what sounds like a syncopal spell so she is under the impression she doesn't need to be on BP medications. Also reports historically BP good when checked at a local store or pharmacy.  Has some mild pain right anterior neck for the past few months. She wonders if she puts too much pressure on her walker and tightens her neck and has some muscle stiffness as a result.   Still wants to do mammograms but only every 2 years. Goes to an outside facility and doesn't want to go to [facility] since she is familiar with the prior place. Says she is due summer 2018.  Also h/o colonoscopy May 2009 at [place] and told it was normal and to f/u in 10 years. However, given age and unremarkable colonoscopy I told her she could be done with screening and she was thrilled about that! See scanned in report.  Continues to follow with our INR clinic.          Social History   Substance Use Topics     Smoking status: Never Smoker     Smokeless tobacco: Never Used     Alcohol use No       If you drink alcohol do you typically have >3 drinks per day or >7 drinks per week? No                        Today's PHQ-2 Score:   PHQ-2 ( 1999 Pfizer) 1/16/2018 5/9/2016   Q1: Little interest or pleasure in doing things 0 0   Q2: Feeling down, depressed or hopeless 0 0   PHQ-2 Score 0 0         Do you feel safe in your environment - Yes    Do you have a Health Care Directive?: No: Advance care planning reviewed with patient; information given to patient to review.      Current providers sharing in care for this patient include: Patient Care Team:  Lety Vela DO as PCP - General (Internal Medicine)    The following health maintenance items are reviewed in Epic and correct as of today:  Health Maintenance   Topic Date Due     OP ANNUAL INR REFERRAL  01/20/2017     ADVANCE DIRECTIVE PLANNING Q5 YRS  02/22/2017     TSH Q1 YEAR  01/16/2019 " "    FALL RISK ASSESSMENT  01/16/2019     LIPID SCREEN Q5 YR FEMALE (SYSTEM ASSIGNED)  01/16/2023     TETANUS Q10 YR  03/18/2026     INFLUENZA VACCINE (SYSTEM ASSIGNED)  Addressed     DEXA SCAN SCREENING (SYSTEM ASSIGNED)  Completed     PNEUMOCOCCAL  Completed     ROS:  Comprehensive ROS negative unless as stated above in HPI.     OBJECTIVE:   /73  Pulse 80  Temp 98.5  F (36.9  C) (Oral)  Ht 5' 4\" (1.626 m)  Wt 218 lb 12.8 oz (99.2 kg)  SpO2 94%  BMI 37.56 kg/m2 Estimated body mass index is 37.56 kg/(m^2) as calculated from the following:    Height as of this encounter: 5' 4\" (1.626 m).    Weight as of this encounter: 218 lb 12.8 oz (99.2 kg).  EXAM:   GENERAL APPEARANCE: alert, no distress and obese  EYES: PERRL, conjunctivae and sclerae normal and right lower eyelid SK  HENT: ear canals and TM's normal, nose and mouth without ulcers or lesions, oropharynx clear and oral mucous membranes moist; has dentures  NECK: hard goiter noted L>R neck (though reports right side as more uncomfortable)  RESP: lungs clear to auscultation - no rales, rhonchi or wheezes  BREAST: patient declined exam  CV: regular rate and rhythm, normal S1 S2, no S3 or S4, no murmur, click or rub, trace shin edema with chronic venous stasis changes without breakdown  ABDOMEN: obese, soft, nontender, no hepatosplenomegaly, no masses and bowel sounds normal  MS: walks slowly with rollator  NEURO: Normal strength and tone, mentation intact and speech normal  PSYCH: mentation appears normal and affect normal/bright, very talkative  SKIN: no ulcers on feet, +mild onychomycosis bilateral great toenails     ASSESSMENT / PLAN:   1. Encounter for preventative adult health care exam with abnormal findings  See HPI that done with colonoscopies but she wishes to f/u with mammograms every 2 years  Flu shot and pneumonia vaccine recommended today (she declined the flu shot but did have the PCV 13)    2. Factor V Leiden (H)  Appreciate INR clinic  H/o " past DVT and PE as well as h/o per chart of afib so is on chronic anticoagulation   - CBC with platelets  - Comprehensive metabolic panel  - Lipid panel reflex to direct LDL Fasting    3. Long-term (current) use of anticoagulants [Z79.01]  She wants to discuss weight loss but also how to manage dietary changes while on Coumadin - nutrition referral placed  - JANTOVEN 5 MG tablet; Take 1-1.5 tablets (5-7.5 mg) by mouth daily As directed by INR clinic - BRAND only  Dispense: 135 tablet; Refill: 3  - NUTRITION REFERRAL    4. Thyroid nodule  H/o past nodule but seems more prominent plus she has some neck discomfort so will check US and labs  - US Thyroid; Future    5. Low TSH level  As above  - TSH with free T4 reflex    6. Class 2 obesity without serious comorbidity with body mass index (BMI) of 37.0 to 37.9 in adult, unspecified obesity type  To discuss with dietician re: safe weight loss at her age and in the context of her Coumadin  - NUTRITION REFERRAL    7. Essential hypertension; goal < 150/90  BP still elevated on recheck though per HPI has h/o white coat HTN; she has been hesitant to past BP medications b/c of an episode of hypotension/syncope  She reports better BP in the community  Advised BP check with next INR    8. Osteopenia, unspecified location  H/o right hip replacement; FRAX low at [facility] in 2014  - Vitamin D Deficiency    9. Onychomycosis  Mild but needs help with cutting toenails so advised on nail care location    10. Seborrheic keratosis  Reassured re: SK on right lower eyelid but if it is getting in her way of vision, she can discuss with eye clinic about removal      End of Life Planning:  Patient currently has an advanced directive: No.  I have verified the patient's ablity to prepare an advanced directive/make health care decisions.  Literature was provided to assist patient in preparing an advanced directive.    COUNSELING:  Reviewed preventive health counseling, as reflected in patient  "instructions       Regular exercise       Healthy diet/nutrition  Estimated body mass index is 37.56 kg/(m^2) as calculated from the following:    Height as of this encounter: 5' 4\" (1.626 m).    Weight as of this encounter: 218 lb 12.8 oz (99.2 kg).  Weight management plan: Discussed healthy diet and exercise guidelines and patient will follow up in 12 months in clinic to re-evaluate.   reports that she has never smoked. She has never used smokeless tobacco.        Appropriate preventive services were discussed with this patient, including applicable screening as appropriate for cardiovascular disease, diabetes, osteopenia/osteoporosis, and glaucoma.  As appropriate for age/gender, discussed screening for colorectal cancer, prostate cancer, breast cancer, and cervical cancer. Checklist reviewing preventive services available has been given to the patient.    Reviewed patients plan of care and provided an AVS. The Intermediate Care Plan ( asthma action plan, low back pain action plan, and migraine action plan) for Susan meets the Care Plan requirement. This Care Plan has been established and reviewed with the Patient.    Patient Instructions   Lab work today    Schedule with the following folks:  1) Please call to schedule your thyroid ultrasound at [facility]: [phone]  2) Call [facility] to ask for a new eye doctor to do a basic eye exam and they that doctor can refer you to an eyelid surgeon to discuss your \"seborrheic keratosis\" on your eyelid  3) Schedule with nutritionist at our clinic if you wish to discuss weight loss (check with [facility] on cost if you wish)  4) Consider seeing a local podiatrist such as [facility]by in [place] for your nail trimming - [phone]    Ask INR nurse to check your blood pressure next visit    Flu shot and pneumonia vaccine recommended today (she declined the flu shot but did have the PCV 13)    Schedule mammogram for summer 2018 as planned    Follow up in about 1 year or " sooner as needed based on the above results      MDM: Additional >15 minutes time outside of preventative health care spent discussing: elevated BP, onychomycosis, eyelid SK, thyroid nodule.    Lety Vela, [facility]

## 2018-01-16 NOTE — NURSING NOTE
"Chief Complaint   Patient presents with     Wellness Visit       Initial /80 (BP Location: Left arm, Cuff Size: Adult Large)  Pulse 80  Temp 98.5  F (36.9  C) (Oral)  Ht 5' 4\" (1.626 m)  Wt 218 lb 12.8 oz (99.2 kg)  SpO2 94%  BMI 37.56 kg/m2 Estimated body mass index is 37.56 kg/(m^2) as calculated from the following:    Height as of this encounter: 5' 4\" (1.626 m).    Weight as of this encounter: 218 lb 12.8 oz (99.2 kg).  Medication Reconciliation: complete   April Navarrete MA  "

## 2018-01-16 NOTE — MR AVS SNAPSHOT
"              After Visit Summary   1/16/2018    Susan Ferguson    MRN: [id number]           Patient Information     Date Of Birth          1940        Visit Information        Provider Department      1/16/2018 10:30 AM Lety Vela,  [facility]        Today's Diagnoses     Encounter for preventative adult health care exam with abnormal findings    -  1    Factor V Leiden (H)        Long-term (current) use of anticoagulants [Z79.01]        Thyroid nodule        Low TSH level        Class 2 obesity without serious comorbidity with body mass index (BMI) of 37.0 to 37.9 in adult, unspecified obesity type        Essential hypertension        Osteopenia, unspecified location        Onychomycosis        Seborrheic keratosis          Care Instructions    Lab work today    Schedule with the following folks:  1) Please call to schedule your thyroid ultrasound at [facility]: [phone]  2) Call [facility] to ask for a new eye doctor to do a basic eye exam and they that doctor can refer you to an eyelid surgeon to discuss your \"seborrheic keratosis\" on your eyelid  3) Schedule with nutritionist at our clinic if you wish to discuss weight loss (check with [facility] on cost if you wish)  4) Consider seeing a local podiatrist such as Gabby in [place] for your nail trimming - [phone]    Ask INR nurse to check your blood pressure next visit    Flu shot and pneumonia vaccine recommended today    Schedule mammogram for summer 2018 as planned    Follow up in about 1 year or sooner as needed based on the above results     Preventive Health Recommendations  Female Ages 65 +    Yearly exam:     See your health care provider every year in order to  o Review health changes.   o Discuss preventive care.    o Review your medicines if your doctor has prescribed any.      You no longer need a yearly Pap test unless you've had an abnormal Pap test in the past 10 years. If you have vaginal symptoms, such as " bleeding or discharge, be sure to talk with your provider about a Pap test.      Every 1 to 2 years, have a mammogram.  If you are over 69, talk with your health care provider about whether or not you want to continue having screening mammograms.      Every 10 years, have a colonoscopy. Or, have a yearly FIT test (stool test). These exams will check for colon cancer.       Have a cholesterol test every 5 years, or more often if your doctor advises it.       Have a diabetes test (fasting glucose) every three years. If you are at risk for diabetes, you should have this test more often.       At age 65, have a bone density scan (DEXA) to check for osteoporosis (brittle bone disease).    Shots:    Get a flu shot each year.    Get a tetanus shot every 10 years.    Talk to your doctor about your pneumonia vaccines. There are now two you should receive - Pneumovax (PPSV 23) and Prevnar (PCV 13).    Talk to your doctor about the shingles vaccine.    Talk to your doctor about the hepatitis B vaccine.    Nutrition:     Eat at least 5 servings of fruits and vegetables each day.      Eat whole-grain bread, whole-wheat pasta and brown rice instead of white grains and rice.      Talk to your provider about Calcium and Vitamin D.     Lifestyle    Exercise at least 150 minutes a week (30 minutes a day, 5 days a week). This will help you control your weight and prevent disease.      Limit alcohol to one drink per day.      No smoking.       Wear sunscreen to prevent skin cancer.       See your dentist twice a year for an exam and cleaning.      See your eye doctor every 1 to 2 years to screen for conditions such as glaucoma, macular degeneration and cataracts.          Follow-ups after your visit        Additional Services     NUTRITION REFERRAL       Your provider has referred you to: MANDEEP: [facility] [place] [phone]   [email or web address]    Please be aware that coverage of these services is  subject to the terms and limitations of your health insurance plan.  Call member services at your health plan with any benefit or coverage questions.      Please bring the following with you to your appointment:    (1) This referral request  (2) Any documents given to you regarding this referral  (3) Any specific questions you have about diet and/or food choices                  Your next 10 appointments already scheduled     Feb 08, 2018 10:45 AM CST   [facility]    [address]  [place] [address] [phone]              Future tests that were ordered for you today     Open Future Orders        Priority Expected Expires Ordered    US Thyroid Routine  1/16/2019 1/16/2018            Who to contact     If you have questions or need follow up information about today's clinic visit or your schedule please contact [facility] directly at [phone].  Normal or non-critical lab and imaging results will be communicated to you by MyChart, letter or phone within 4 business days after the clinic has received the results. If you do not hear from us within 7 days, please contact the clinic through [employer] or phone. If you have a critical or abnormal lab result, we will notify you by phone as soon as possible.  Submit refill requests through [employer] or call your pharmacy and they will forward the refill request to us. Please allow 3 business days for your refill to be completed.          Additional Information About Your Visit        MyChart Information     [employer] gives you secure access to your electronic health record. If you see a primary care provider, you can also send messages to your care team and make appointments. If you have questions, please call your primary care clinic.  If you do not have a primary care provider, please call [phone] and they will assist you.        Care EveryWhere ID     This is  "your Care EveryWhere ID. This could be used by other organizations to access your [facility] medical records  [id number]        Your Vitals Were     Pulse Temperature Height Pulse Oximetry BMI (Body Mass Index)       80 98.5  F (36.9  C) (Oral) 5' 4\" (1.626 m) 94% 37.56 kg/m2        Blood Pressure from Last 3 Encounters:   01/16/18 166/80   05/09/16 160/80   04/11/16 188/76    Weight from Last 3 Encounters:   01/16/18 218 lb 12.8 oz (99.2 kg)   05/09/16 210 lb (95.3 kg)   04/11/16 208 lb (94.3 kg)              We Performed the Following     CBC with platelets     Comprehensive metabolic panel     Lipid panel reflex to direct LDL Fasting     NUTRITION REFERRAL     TSH with free T4 reflex     Vitamin D Deficiency          Today's Medication Changes          These changes are accurate as of: 1/16/18 11:23 AM.  If you have any questions, ask your nurse or doctor.               Stop taking these medicines if you haven't already. Please contact your care team if you have questions.     doxycycline 100 MG capsule   Commonly known as:  VIBRAMYCIN   Stopped by:  Lety Vela,                 Where to get your medicines      These medications were sent to [facility] [address] [place] [address]     Phone:  [phone]     JANTOVEN 5 MG tablet                Primary Care Provider Office Phone # Fax #    Lety Vela [phone] [address]  [facility] [address]        Equal Access to Services     SHAWN WALKER : Hadii aad ku hadasho Soomaali, waaxda luqadaha, qaybta kaalmada adeegyada, lenora idiin hayaan adeeg kharash la'aan . So [facility] [phone].    ATENCIÓN: Si habla español, tiene a zafar disposición servicios gratuitos de asistencia lingüística. Llame al [phone].    We comply with applicable federal civil rights laws and [place] laws. We do not discriminate on the basis of race, color, national origin, " age, disability, sex, sexual orientation, or gender identity.            Thank you!     Thank you for choosing [facility]  for your care. Our goal is always to provide you with excellent care. Hearing back from our patients is one way we can continue to improve our services. Please take a few minutes to complete the written survey that you may receive in the mail after your visit with us. Thank you!             Your Updated Medication List - Protect others around you: Learn how to safely use, store and throw away your medicines at www.disposemymeds.org.          This list is accurate as of: 1/16/18 11:23 AM.  Always use your most recent med list.                   Brand Name Dispense Instructions for use Diagnosis    acetaminophen 325 MG tablet    TYLENOL     Take 2 tablets (650 mg) by mouth every 4 hours as needed for mild pain        ascorbic acid 1000 MG Tabs    vitamin C     Take 1,000 mg by mouth daily        JANTOVEN 5 MG tablet   Generic drug:  warfarin     135 tablet    Take 1-1.5 tablets (5-7.5 mg) by mouth daily As directed by INR clinic - BRAND only    Long-term (current) use of anticoagulants       MOISTURIZING LOTION EX      prn        Multi-vitamin Tabs tablet   Generic drug:  multivitamin, therapeutic with minerals     30    1 TABLET DAILY        vitamin D 2000 UNITS tablet      Take 2,000 Units by mouth daily        vitamin E 400 UNIT capsule      Take 400 Units by mouth 2 times daily

## 2018-01-17 LAB
ALBUMIN SERPL-MCNC: 3.9 G/DL (ref 3.4–5)
ALP SERPL-CCNC: 69 U/L (ref 40–150)
ALT SERPL W P-5'-P-CCNC: 18 U/L (ref 0–50)
ANION GAP SERPL CALCULATED.3IONS-SCNC: 8 MMOL/L (ref 3–14)
AST SERPL W P-5'-P-CCNC: 16 U/L (ref 0–45)
BILIRUB SERPL-MCNC: 0.6 MG/DL (ref 0.2–1.3)
BUN SERPL-MCNC: 13 MG/DL (ref 7–30)
CALCIUM SERPL-MCNC: 8.9 MG/DL (ref 8.5–10.1)
CHLORIDE SERPL-SCNC: 106 MMOL/L (ref 94–109)
CHOLEST SERPL-MCNC: 247 MG/DL
CO2 SERPL-SCNC: 26 MMOL/L (ref 20–32)
CREAT SERPL-MCNC: 0.54 MG/DL (ref 0.52–1.04)
GFR SERPL CREATININE-BSD FRML MDRD: >90 ML/MIN/1.7M2
GLUCOSE SERPL-MCNC: 98 MG/DL (ref 70–99)
HDLC SERPL-MCNC: 79 MG/DL
LDLC SERPL CALC-MCNC: 154 MG/DL
NONHDLC SERPL-MCNC: 168 MG/DL
POTASSIUM SERPL-SCNC: 4.4 MMOL/L (ref 3.4–5.3)
PROT SERPL-MCNC: 7.1 G/DL (ref 6.8–8.8)
SODIUM SERPL-SCNC: 140 MMOL/L (ref 133–144)
T4 FREE SERPL-MCNC: 1.11 NG/DL (ref 0.76–1.46)
TRIGL SERPL-MCNC: 68 MG/DL
TSH SERPL DL<=0.005 MIU/L-ACNC: 0.28 MU/L (ref 0.4–4)

## 2018-02-08 ENCOUNTER — ANTICOAGULATION THERAPY VISIT (OUTPATIENT)
Dept: NURSING | Facility: CLINIC | Age: 78
End: 2018-02-08
Payer: COMMERCIAL

## 2018-02-08 LAB — INR POINT OF CARE: 2.1 (ref 0.86–1.14)

## 2018-02-08 PROCEDURE — 36416 COLLJ CAPILLARY BLOOD SPEC: CPT

## 2018-02-08 PROCEDURE — 99207 ZZC NO CHARGE NURSE ONLY: CPT

## 2018-02-08 PROCEDURE — 85610 PROTHROMBIN TIME: CPT | Mod: QW

## 2018-02-08 NOTE — PROGRESS NOTES
ANTICOAGULATION FOLLOW-UP CLINIC VISIT    Patient Name:  Susan Ferguson  Date:  2/8/2018  Contact Type:  Face to Face    SUBJECTIVE:        OBJECTIVE    INR Protime   Date Value Ref Range Status   02/08/2018 2.1 (A) 0.86 - 1.14 Final     Factor 2 Assay   Date Value Ref Range Status   03/03/2009 19 (L) 60 - 140 % Final       ASSESSMENT / PLAN  INR assessment THER    Recheck INR In: 4 WEEKS    INR Location Clinic      Anticoagulation Summary as of 2/8/2018     INR goal 2.0-3.0    Today's INR 2.1    Maintenance plan 7.5 mg (5 mg x 1.5) on Mon; 5 mg (5 mg x 1) all other days    Full instructions 7.5 mg on Mon; 5 mg all other days    Weekly total 37.5 mg    Plan last modified Socorro Bryant, RN (2/8/2018)    Next INR check 3/8/2018    Target end date       Anticoagulation Episode Summary     INR check location     Preferred lab     Send INR reminders to [facility] ANT[facility]AGUL[facility]    Comments       Anticoagulation Care Providers     Provider Role Specialty Phone number    Lety Vela,  [facility] Internal Medicine [phone]            See the Encounter Report to view Anticoagulation Flowsheet and Dosing Calendar (Go to Encounters tab in chart review, and find the Anticoagulation Therapy Visit)        Socorro Bryant RN

## 2018-02-08 NOTE — MR AVS SNAPSHOT
Susan Ferguson   2/8/2018 10:45 AM   Anticoagulation Therapy Visit    Description:  77 year old female   Provider:   ANTICOAGULATION CLINIC   Department:   Nurse           INR as of 2/8/2018     Today's INR 2.1      Anticoagulation Summary as of 2/8/2018     INR goal 2.0-3.0    Today's INR 2.1    Full instructions 7.5 mg on Mon; 5 mg all other days    Next INR check 3/8/2018      Your next Anticoagulation Clinic appointment(s)     Feb 08, 2018 10:45 AM CST   Anticoagulation [facility]  [facility] CLINIC   [facility]    [address]  [place] [address]   [phone]            Mar 08, 2018 10:15 AM CST   Anticoagulation Visit with  ANTICOAGULATION CLINIC   [facility]    [address]  [place] [address]   [phone]              Contact Numbers     Clinic Number:         February 2018 Details    Sun Mon Tue Wed Thu Fri Sat         1               2               3                 4               5               6               7               8      5 mg   See details      9      5 mg         10      5 mg           11      5 mg         12      7.5 mg         13      5 mg         14      5 mg         15      5 mg         16      5 mg         17      5 mg           18      5 mg         19      7.5 mg         20      5 mg         21      5 mg         22      5 mg         23      5 mg         24      5 mg           25      5 mg         26      7.5 mg         27      5 mg         28      5 mg             Date Details   02/08 This INR check               How to take your warfarin dose     To take:  5 mg Take 1 of the 5 mg tablets.    To take:  7.5 mg Take 1.5 of the 5 mg tablets.           March 2018 Details    Sun Mon Tue Wed Thu Fri Sat         1      5 mg         2      5 mg         3      5 mg           4      5 mg         5      7.5 mg         6      5 mg         7      5 mg         8            9               10                  11               12               13               14               15               16               17                 18               19               20               21               22               23               24                 25               26               27               28               29               30               31                Date Details   No additional details    Date of next INR:  3/8/2018         How to take your warfarin dose     To take:  5 mg Take 1 of the 5 mg tablets.    To take:  7.5 mg Take 1.5 of the 5 mg tablets.

## 2018-03-08 ENCOUNTER — ANTICOAGULATION THERAPY VISIT (OUTPATIENT)
Dept: NURSING | Facility: CLINIC | Age: 78
End: 2018-03-08
Payer: COMMERCIAL

## 2018-03-08 LAB — INR POINT OF CARE: 2.5 (ref 0.86–1.14)

## 2018-03-08 PROCEDURE — 36416 COLLJ CAPILLARY BLOOD SPEC: CPT

## 2018-03-08 PROCEDURE — 85610 PROTHROMBIN TIME: CPT | Mod: QW

## 2018-03-08 PROCEDURE — 99207 ZZC NO CHARGE NURSE ONLY: CPT

## 2018-03-08 NOTE — MR AVS SNAPSHOT
Susan Ferguson   3/8/2018 10:15 AM   Anticoagulation Therapy Visit    Description:  77 year old female   Provider:   ANTICOAGULATION CLINIC   Department:  Cs Nurse           INR as of 3/8/2018     Today's INR 2.5      Anticoagulation Summary as of 3/8/2018     INR goal 2.0-3.0    Today's INR 2.5    Full instructions 7.5 mg on Mon; 5 mg all other days    Next INR check 4/12/2018      Your next Anticoagulation Clinic appointment(s)     Apr 12, 2018 11:00 AM CDT   Anticoagulation Visit with  ANTICOAGULATION CLINIC   [facility] [place] ([facility])    [address]  [place] [address]   [phone]              Contact Numbers     Clinic Number:         March 2018 Details    Sun Mon Tue Wed Thu Fri Sat         1               2               3                 4               5               6               7               8      5 mg   See details      9      5 mg         10      5 mg           11      5 mg         12      7.5 mg         13      5 mg         14      5 mg         15      5 mg         16      5 mg         17      5 mg           18      5 mg         19      7.5 mg         20      5 mg         21      5 mg         22      5 mg         23      5 mg         24      5 mg           25      5 mg         26      7.5 mg         27      5 mg         28      5 mg         29      5 mg         30      5 mg         31      5 mg          Date Details   03/08 This INR check               How to take your warfarin dose     To take:  5 mg Take 1 of the 5 mg tablets.    To take:  7.5 mg Take 1.5 of the 5 mg tablets.           April 2018 Details    Sun Mon Tue Wed Thu Fri Sat     1      5 mg         2      7.5 mg         3      5 mg         4      5 mg         5      5 mg         6      5 mg         7      5 mg           8      5 mg         9      7.5 mg         10      5 mg         11      5 mg         12            13               14                 15               16               17                18               19               20               21                 22               23               24               25               26               27               28                 29               30                     Date Details   No additional details    Date of next INR:  4/12/2018         How to take your warfarin dose     To take:  5 mg Take 1 of the 5 mg tablets.    To take:  7.5 mg Take 1.5 of the 5 mg tablets.

## 2018-03-08 NOTE — PROGRESS NOTES
ANTICOAGULATION FOLLOW-UP CLINIC VISIT    Patient Name:  Susan Ferguson  Date:  3/8/2018  Contact Type:  Face to Face    SUBJECTIVE:     Patient Findings     Positives No Problem Findings           OBJECTIVE    INR Protime   Date Value Ref Range Status   03/08/2018 2.5 (A) 0.86 - 1.14 Final     Factor 2 Assay   Date Value Ref Range Status   03/03/2009 19 (L) 60 - 140 % Final       ASSESSMENT / PLAN  INR assessment THER    Recheck INR In: 5 WEEKS    INR Location Clinic      Anticoagulation Summary as of 3/8/2018     INR goal 2.0-3.0    Today's INR 2.5    Maintenance plan 7.5 mg (5 mg x 1.5) on Mon; 5 mg (5 mg x 1) all other days    Full instructions 7.5 mg on Mon; 5 mg all other days    Weekly total 37.5 mg    No change documented Lien Ray RN    Plan last modified Socorro Bryant RN (2/8/2018)    Next INR check 4/12/2018    Target end date       Anticoagulation Episode Summary     INR check location     Preferred lab     Send INR reminders to [facility] ANT[facility]AGULATION    Comments       Anticoagulation Care Providers     Provider Role Specialty Phone number    Lety Vela DO [facility] Internal Medicine [phone]            See the Encounter Report to view Anticoagulation Flowsheet and Dosing Calendar (Go to Encounters tab in chart review, and find the Anticoagulation Therapy Visit)    Dosage adjustment made based on physician directed care plan.    Lien Ray RN

## 2018-04-05 ENCOUNTER — ANTICOAGULATION THERAPY VISIT (OUTPATIENT)
Dept: NURSING | Facility: CLINIC | Age: 78
End: 2018-04-05
Payer: COMMERCIAL

## 2018-04-05 LAB — INR POINT OF CARE: 3.2 (ref 0.86–1.14)

## 2018-04-05 PROCEDURE — 36416 COLLJ CAPILLARY BLOOD SPEC: CPT

## 2018-04-05 PROCEDURE — 99207 ZZC NO CHARGE NURSE ONLY: CPT

## 2018-04-05 PROCEDURE — 85610 PROTHROMBIN TIME: CPT | Mod: QW

## 2018-04-05 NOTE — PROGRESS NOTES
ANTICOAGULATION FOLLOW-UP CLINIC VISIT    Patient Name:  Susan Ferguson  Date:  4/5/2018  Contact Type:  Face to Face    SUBJECTIVE:     Patient Findings     Comments Slight arthritis symptoms.  Taking tylenol.            OBJECTIVE    INR Protime   Date Value Ref Range Status   04/05/2018 3.2 (A) 0.86 - 1.14 Final     Factor 2 Assay   Date Value Ref Range Status   03/03/2009 19 (L) 60 - 140 % Final       ASSESSMENT / PLAN  INR assessment SUPRA    Recheck INR In: 5 WEEKS    INR Location Clinic      Anticoagulation Summary as of 4/5/2018     INR goal 2.0-3.0    Today's INR 3.2!    Maintenance plan 7.5 mg (5 mg x 1.5) on Mon; 5 mg (5 mg x 1) all other days    Full instructions 7.5 mg on Mon; 5 mg all other days    Weekly total 37.5 mg    Plan last modified Socorro Bryant, RN (2/8/2018)    Next INR check 5/10/2018    Target end date       Anticoagulation Episode Summary     INR check location     Preferred lab     Send INR reminders to [facility] ANTICOAGULATION    Comments       Anticoagulation Care Providers     Provider Role Specialty Phone number    Lety Vela DO [facility] Internal Medicine [phone]            See the Encounter Report to view Anticoagulation Flowsheet and Dosing Calendar (Go to Encounters tab in chart review, and find the Anticoagulation Therapy Visit)    Dosage adjustment made based on physician directed care plan.    Lien Ray RN

## 2018-05-10 ENCOUNTER — ANTICOAGULATION THERAPY VISIT (OUTPATIENT)
Dept: NURSING | Facility: CLINIC | Age: 78
End: 2018-05-10
Payer: COMMERCIAL

## 2018-05-10 LAB — INR POINT OF CARE: 2.2 (ref 0.86–1.14)

## 2018-05-10 PROCEDURE — 85610 PROTHROMBIN TIME: CPT | Mod: QW

## 2018-05-10 PROCEDURE — 36416 COLLJ CAPILLARY BLOOD SPEC: CPT

## 2018-05-10 PROCEDURE — 99207 ZZC NO CHARGE NURSE ONLY: CPT

## 2018-05-10 NOTE — PROGRESS NOTES
"  ANTICOAGULATION FOLLOW-UP CLINIC VISIT    Patient Name:  Susan Ferguson  Date:  5/10/2018  Contact Type:  Face to Face    SUBJECTIVE:     Patient Findings     Comments Right thigh pain.  Hx right hip replacement (2008) and in 2014 FX femur (#13 pins).   Being followed by Ortho.   Scheduled Tues 5-15-18 at [facility] \"test with dye\".    Patient was informed she did not need to HOLD Warfarin or change dosing prior to procedure.    Taking tylenol for pain.            OBJECTIVE    INR Protime   Date Value Ref Range Status   05/10/2018 2.2 (A) 0.86 - 1.14 Final     Factor 2 Assay   Date Value Ref Range Status   03/03/2009 19 (L) 60 - 140 % Final       ASSESSMENT / PLAN  INR assessment THER    Recheck INR In: 4 WEEKS    INR Location Clinic      Anticoagulation Summary as of 5/10/2018     INR goal 2.0-3.0    Today's INR 2.2    Maintenance plan 7.5 mg (5 mg x 1.5) on Mon; 5 mg (5 mg x 1) all other days    Full instructions 7.5 mg on Mon; 5 mg all other days    Weekly total 37.5 mg    No change documented Lien Ray RN    Plan last modified Socorro Bryant RN (2/8/2018)    Next INR check 6/7/2018    Target end date       Anticoagulation Episode Summary     INR check location     Preferred lab     Send INR reminders to [facility] ANTICOAGULATION    Comments       Anticoagulation Care Providers     Provider Role Specialty Phone number    Lety Vela AlexaDO [facility] Internal Medicine [phone]            See the Encounter Report to view Anticoagulation Flowsheet and Dosing Calendar (Go to Encounters tab in chart review, and find the Anticoagulation Therapy Visit)    Dosage adjustment made based on physician directed care plan.    Lien Ray RN               "

## 2018-05-10 NOTE — MR AVS SNAPSHOT
Susan Ferguson   5/10/2018 11:15 AM   Anticoagulation Therapy Visit    Description:  77 year old female   Provider:   ANTICOAGULATION CLINIC   Department:   Nurse           INR as of 5/10/2018     Today's INR 2.2      Anticoagulation Summary as of 5/10/2018     INR goal 2.0-3.0    Today's INR 2.2    Full instructions 7.5 mg on Mon; 5 mg all other days    Next INR check 6/7/2018      Your next Anticoagulation Clinic appointment(s)     Jun 07, 2018 11:30 AM CDT   Anticoagulation Visit with  ANTICOAGULATION CLINIC   [facility] [place] ([facility])    [address]  [place] [address]   [phone]              Contact Numbers     Clinic Number:         May 2018 Details    Sun Mon Tue Wed Thu Fri Sat       1               2               3               4               5                 6               7               8               9               10      5 mg   See details      11      5 mg         12      5 mg           13      5 mg         14      7.5 mg         15      5 mg         16      5 mg         17      5 mg         18      5 mg         19      5 mg           20      5 mg         21      7.5 mg         22      5 mg         23      5 mg         24      5 mg         25      5 mg         26      5 mg           27      5 mg         28      7.5 mg         29      5 mg         30      5 mg         31      5 mg            Date Details   05/10 This INR check               How to take your warfarin dose     To take:  5 mg Take 1 of the 5 mg tablets.    To take:  7.5 mg Take 1.5 of the 5 mg tablets.           June 2018 Details    Sun Mon Tue Wed Thu Fri Sat          1      5 mg         2      5 mg           3      5 mg         4      7.5 mg         5      5 mg         6      5 mg         7            8               9                 10               11               12               13               14               15               16                 17               18                19               20               21               22               23                 24               25               26               27               28               29               30                Date Details   No additional details    Date of next INR:  6/7/2018         How to take your warfarin dose     To take:  5 mg Take 1 of the 5 mg tablets.    To take:  7.5 mg Take 1.5 of the 5 mg tablets.

## 2018-06-07 ENCOUNTER — ANTICOAGULATION THERAPY VISIT (OUTPATIENT)
Dept: NURSING | Facility: CLINIC | Age: 78
End: 2018-06-07
Payer: COMMERCIAL

## 2018-06-07 LAB — INR POINT OF CARE: 3.4 (ref 0.86–1.14)

## 2018-06-07 PROCEDURE — 99207 ZZC NO CHARGE NURSE ONLY: CPT

## 2018-06-07 PROCEDURE — 85610 PROTHROMBIN TIME: CPT | Mod: QW

## 2018-06-07 PROCEDURE — 36416 COLLJ CAPILLARY BLOOD SPEC: CPT

## 2018-06-07 NOTE — PROGRESS NOTES
ANTICOAGULATION FOLLOW-UP CLINIC VISIT    Patient Name:  Susan Ferguson  Date:  6/7/2018  Contact Type:  Face to Face    SUBJECTIVE:     Patient Findings     Comments Pain and inflammation in right hip.  Taking Tylenol 2000mg-2500mg daily with fairly good relief.  Seeing Ortho and Neurologist for those symptoms.             OBJECTIVE    INR Protime   Date Value Ref Range Status   06/07/2018 3.4 (A) 0.86 - 1.14 Final     Factor 2 Assay   Date Value Ref Range Status   03/03/2009 19 (L) 60 - 140 % Final       ASSESSMENT / PLAN  INR assessment SUPRA    Recheck INR In: 4 WEEKS    INR Location Clinic      Anticoagulation Summary as of 6/7/2018     INR goal 2.0-3.0    Today's INR 3.4!    Warfarin maintenance plan 7.5 mg (5 mg x 1.5) on Mon; 5 mg (5 mg x 1) all other days    Full warfarin instructions 6/7: 2.5 mg; Otherwise 7.5 mg on Mon; 5 mg all other days    Weekly warfarin total 37.5 mg    Plan last modified Socorro Bryant RN (2/8/2018)    Next INR check 7/5/2018    Target end date       Anticoagulation Episode Summary     INR check location     Preferred lab     Send INR reminders to [facility] ANTICOAGULATION    Comments       Anticoagulation Care Providers     Provider Role Specialty Phone number    VelaLety DO [facility] Internal Medicine [phone]            See the Encounter Report to view Anticoagulation Flowsheet and Dosing Calendar (Go to Encounters tab in chart review, and find the Anticoagulation Therapy Visit)    Dosage adjustment made based on physician directed care plan.    Lien Ray RN

## 2018-06-07 NOTE — MR AVS SNAPSHOT
Susan Ferguson   6/7/2018 11:30 AM   Anticoagulation Therapy Visit    Description:  77 year old female   Provider:   ANTICOAGULATION CLINIC   Department:   Nurse           INR as of 6/7/2018     Today's INR 3.4!      Anticoagulation Summary as of 6/7/2018     INR goal 2.0-3.0    Today's INR 3.4!    Full warfarin instructions 6/7: 2.5 mg; Otherwise 7.5 mg on Mon; 5 mg all other days    Next INR check 7/5/2018      Your next Anticoagulation Clinic appointment(s)     Jun 07, 2018 11:30 AM CDT   Anticoagulation Visit with  ANTICOAGULATION CLINIC   [facility]    [address]  [place] [address]   [phone]            Jul 05, 2018 10:15 AM CDT   Anticoagulation Visit with  ANTICOAGULATION CLINIC   [facility] ([facility]    [address]  [place] [address]   [phone]              Contact Numbers     Clinic Number:         June 2018 Details    Sun Mon Tue Wed Thu Fri Sat          1               2                 3               4               5               6               7      2.5 mg   See details      8      5 mg         9      5 mg           10      5 mg         11      7.5 mg         12      5 mg         13      5 mg         14      5 mg         15      5 mg         16      5 mg           17      5 mg         18      7.5 mg         19      5 mg         20      5 mg         21      5 mg         22      5 mg         23      5 mg           24      5 mg         25      7.5 mg         26      5 mg         27      5 mg         28      5 mg         29      5 mg         30      5 mg          Date Details   06/07 This INR check               How to take your warfarin dose     To take:  2.5 mg Take 0.5 of a 5 mg tablet.    To take:  5 mg Take 1 of the 5 mg tablets.    To take:  7.5 mg Take 1.5 of the 5 mg tablets.           July 2018 Details    Sun Mon Tue Wed Thu Fri Sat     1      5 mg         2      7.5 mg         3      5 mg          4      5 mg         5            6               7                 8               9               10               11               12               13               14                 15               16               17               18               19               20               21                 22               23               24               25               26               27               28                 29               30               31                    Date Details   No additional details    Date of next INR:  7/5/2018         How to take your warfarin dose     To take:  5 mg Take 1 of the 5 mg tablets.    To take:  7.5 mg Take 1.5 of the 5 mg tablets.

## 2018-07-05 ENCOUNTER — ANTICOAGULATION THERAPY VISIT (OUTPATIENT)
Dept: NURSING | Facility: CLINIC | Age: 78
End: 2018-07-05
Payer: COMMERCIAL

## 2018-07-05 LAB — INR POINT OF CARE: 2.8 (ref 0.86–1.14)

## 2018-07-05 PROCEDURE — 85610 PROTHROMBIN TIME: CPT | Mod: QW

## 2018-07-05 PROCEDURE — 36416 COLLJ CAPILLARY BLOOD SPEC: CPT

## 2018-07-05 PROCEDURE — 99207 ZZC NO CHARGE NURSE ONLY: CPT

## 2018-07-05 NOTE — MR AVS SNAPSHOT
Susan Ferguson   7/5/2018 10:15 AM   Anticoagulation Therapy Visit    Description:  78 year old female   Provider:   ANTICOAGULATION CLINIC   Department:   Nurse           INR as of 7/5/2018     Today's INR 2.8      Anticoagulation Summary as of 7/5/2018     INR goal 2.0-3.0    Today's INR 2.8    Full warfarin instructions 7.5 mg on Mon; 5 mg all other days    Next INR check 8/9/2018      Your next Anticoagulation Clinic appointment(s)     Aug 09, 2018 11:00 AM CDT   Anticoagulation Visit with  ANTICOAGULATION CLINIC   [facility] [place] ([facility])    [address]  [place] [address]   [phone]              Contact Numbers     Clinic Number:         July 2018 Details    Sun Mon Tue Wed Thu Fri Sat     1               2               3               4               5      5 mg   See details      6      5 mg         7      5 mg           8      5 mg         9      7.5 mg         10      5 mg         11      5 mg         12      5 mg         13      5 mg         14      5 mg           15      5 mg         16      7.5 mg         17      5 mg         18      5 mg         19      5 mg         20      5 mg         21      5 mg           22      5 mg         23      7.5 mg         24      5 mg         25      5 mg         26      5 mg         27      5 mg         28      5 mg           29      5 mg         30      7.5 mg         31      5 mg              Date Details   07/05 This INR check               How to take your warfarin dose     To take:  5 mg Take 1 of the 5 mg tablets.    To take:  7.5 mg Take 1.5 of the 5 mg tablets.           August 2018 Details    Sun Mon Tue Wed Thu Fri Sat        1      5 mg         2      5 mg         3      5 mg         4      5 mg           5      5 mg         6      7.5 mg         7      5 mg         8      5 mg         9            10               11                 12               13               14               15               16                17               18                 19               20               21               22               23               24               25                 26               27               28               29               30               31                 Date Details   No additional details    Date of next INR:  8/9/2018         How to take your warfarin dose     To take:  5 mg Take 1 of the 5 mg tablets.    To take:  7.5 mg Take 1.5 of the 5 mg tablets.

## 2018-07-05 NOTE — PROGRESS NOTES
ANTICOAGULATION FOLLOW-UP CLINIC VISIT    Patient Name:  Susan Ferguson  Date:  7/5/2018  Contact Type:  Face to Face    SUBJECTIVE:     Patient Findings     Positives No Problem Findings           OBJECTIVE    INR Protime   Date Value Ref Range Status   07/05/2018 2.8 (A) 0.86 - 1.14 Final     Factor 2 Assay   Date Value Ref Range Status   03/03/2009 19 (L) 60 - 140 % Final       ASSESSMENT / PLAN  No question data found.  Anticoagulation Summary as of 7/5/2018     INR goal 2.0-3.0    Today's INR 2.8    Warfarin maintenance plan 7.5 mg (5 mg x 1.5) on Mon; 5 mg (5 mg x 1) all other days    Full warfarin instructions 7.5 mg on Mon; 5 mg all other days    Weekly warfarin total 37.5 mg    No change documented Lien Ray RN    Plan last modified Socorro Bryant RN (2/8/2018)    Next INR check 8/9/2018    Target end date       Anticoagulation Episode Summary     INR check location     Preferred lab     Send INR reminders to CS ANTICOAGULATION    Comments       Anticoagulation Care Providers     Provider Role Specialty Phone number    Lety Vela DO [facility] Internal Medicine [phone]            See the Encounter Report to view Anticoagulation Flowsheet and Dosing Calendar (Go to Encounters tab in chart review, and find the Anticoagulation Therapy Visit)    Dosage adjustment made based on physician directed care plan.    Lien Ray RN

## 2018-08-09 ENCOUNTER — ANTICOAGULATION THERAPY VISIT (OUTPATIENT)
Dept: NURSING | Facility: CLINIC | Age: 78
End: 2018-08-09
Payer: COMMERCIAL

## 2018-08-09 LAB — INR POINT OF CARE: 3.5 (ref 0.86–1.14)

## 2018-08-09 PROCEDURE — 85610 PROTHROMBIN TIME: CPT | Mod: QW

## 2018-08-09 PROCEDURE — 99207 ZZC NO CHARGE NURSE ONLY: CPT

## 2018-08-09 PROCEDURE — 36416 COLLJ CAPILLARY BLOOD SPEC: CPT

## 2018-08-09 NOTE — MR AVS SNAPSHOT
Susan Ferguson   8/9/2018 11:00 AM   Anticoagulation Therapy Visit    Description:  78 year old female   Provider:   ANTICOAGULATION CLINIC   Department:  Cs Nurse           INR as of 8/9/2018     Today's INR 3.5!      Anticoagulation Summary as of 8/9/2018     INR goal 2.0-3.0    Today's INR 3.5!    Full warfarin instructions 8/9: 2.5 mg; Otherwise 7.5 mg on Mon; 5 mg all other days    Next INR check 9/6/2018      Your next Anticoagulation Clinic appointment(s)     Sep 06, 2018 10:15 AM CDT   Anticoagulation [facility] with  [facility]ATION CLINIC   [facility]    [address] [facility] [address] [phone]              Contact Numbers     Clinic Number:         August 2018 Details    Sun Mon Tue Wed Thu Fri Sat        1               2               3               4                 5               6               7               8               9      2.5 mg   See details      10      5 mg         11      5 mg           12      5 mg         13      7.5 mg         14      5 mg         15      5 mg         16      5 mg         17      5 mg         18      5 mg           19      5 mg         20      7.5 mg         21      5 mg         22      5 mg         23      5 mg         24      5 mg         25      5 mg           26      5 mg         27      7.5 mg         28      5 mg         29      5 mg         30      5 mg         31      5 mg           Date Details   08/09 This INR check               How to take your warfarin dose     To take:  2.5 mg Take 0.5 of a 5 mg tablet.    To take:  5 mg Take 1 of the 5 mg tablets.    To take:  7.5 mg Take 1.5 of the 5 mg tablets.           September 2018 Details    Sun Mon Tue Wed Thu Fri Sat           1      5 mg           2      5 mg         3      7.5 mg         4      5 mg         5      5 mg         6            7               8                 9               10               11               12               13                14               15                 16               17               18               19               20               21               22                 23               24               25               26               27               28               29                 30                      Date Details   No additional details    Date of next INR:  9/6/2018         How to take your warfarin dose     To take:  5 mg Take 1 of the 5 mg tablets.    To take:  7.5 mg Take 1.5 of the 5 mg tablets.

## 2018-08-09 NOTE — PROGRESS NOTES
ANTICOAGULATION FOLLOW-UP CLINIC VISIT    Patient Name:  Susan Ferguson  Date:  8/9/2018  Contact Type:  Face to Face    SUBJECTIVE:     Patient Findings     Positives Change in diet/appetite (hasn't been eating as many greens as usual. Will resume her normal diet.  )    Comments Reports increased generalized achiness.  Taking Tylenol.             OBJECTIVE    INR Protime   Date Value Ref Range Status   08/09/2018 3.5 (A) 0.86 - 1.14 Final     Factor 2 Assay   Date Value Ref Range Status   03/03/2009 19 (L) 60 - 140 % Final       ASSESSMENT / PLAN  INR assessment SUPRA    Recheck INR In: 4 WEEKS    INR Location Clinic      Anticoagulation Summary as of 8/9/2018     INR goal 2.0-3.0    Today's INR 3.5!    Warfarin maintenance plan 7.5 mg (5 mg x 1.5) on Mon; 5 mg (5 mg x 1) all other days    Full warfarin instructions 8/9: 2.5 mg; Otherwise 7.5 mg on Mon; 5 mg all other days    Weekly warfarin total 37.5 mg    Plan last modified Socorro Bryant RN (2/8/2018)    Next INR check 9/6/2018    Target end date       Anticoagulation Episode Summary     INR check location     Preferred lab     Send INR reminders to CS ANTICOAGULATION    Comments       Anticoagulation Care Providers     Provider Role Specialty Phone number    Lety Vela DO [facility] Internal Medicine [phone]            See the Encounter Report to view Anticoagulation Flowsheet and Dosing Calendar (Go to Encounters tab in chart review, and find the Anticoagulation Therapy Visit)    Dosage adjustment made based on physician directed care plan.    Lien Ray RN

## 2018-09-06 ENCOUNTER — ANTICOAGULATION THERAPY VISIT (OUTPATIENT)
Dept: NURSING | Facility: CLINIC | Age: 78
End: 2018-09-06
Payer: COMMERCIAL

## 2018-09-06 LAB — INR POINT OF CARE: 2.6 (ref 0.86–1.14)

## 2018-09-06 PROCEDURE — 99207 ZZC NO CHARGE NURSE ONLY: CPT

## 2018-09-06 PROCEDURE — 36416 COLLJ CAPILLARY BLOOD SPEC: CPT

## 2018-09-06 PROCEDURE — 85610 PROTHROMBIN TIME: CPT | Mod: QW

## 2018-09-06 NOTE — PROGRESS NOTES
ANTICOAGULATION FOLLOW-UP CLINIC VISIT    Patient Name:  Susan Ferguson  Date:  9/6/2018  Contact Type:  Face to Face    SUBJECTIVE:     Patient Findings     Positives No Problem Findings           OBJECTIVE    INR Protime   Date Value Ref Range Status   09/06/2018 2.6 (A) 0.86 - 1.14 Final     Factor 2 Assay   Date Value Ref Range Status   03/03/2009 19 (L) 60 - 140 % Final       ASSESSMENT / PLAN  INR assessment THER    Recheck INR In: 5 WEEKS    INR Location Clinic      Anticoagulation Summary as of 9/6/2018     INR goal 2.0-3.0    Today's INR 2.6    Warfarin maintenance plan 7.5 mg (5 mg x 1.5) on Mon; 5 mg (5 mg x 1) all other days    Full warfarin instructions 7.5 mg on Mon; 5 mg all other days    Weekly warfarin total 37.5 mg    No change documented Lien Ray RN    Plan last modified Socorro Bryant RN (2/8/2018)    Next INR check 10/11/2018    Target end date       Anticoagulation Episode Summary     INR check location     Preferred lab     Send INR reminders to CS ANTICOAGULATION    Comments       Anticoagulation Care Providers     Provider Role Specialty Phone number    Lety Vela DO [facility] Internal Medicine [phone]            See the Encounter Report to view Anticoagulation Flowsheet and Dosing Calendar (Go to Encounters tab in chart review, and find the Anticoagulation Therapy Visit)    Dosage adjustment made based on physician directed care plan.    Lien Ray RN

## 2018-09-06 NOTE — MR AVS SNAPSHOT
Susan Ferguson   9/6/2018 10:15 AM   Anticoagulation Therapy Visit    Description:  78 year old female   Provider:   ANTICOAGULATION CLINIC   Department:   Nurse           INR as of 9/6/2018     Today's INR 2.6      Anticoagulation Summary as of 9/6/2018     INR goal 2.0-3.0    Today's INR 2.6    Full warfarin instructions 7.5 mg on Mon; 5 mg all other days    Next INR check 10/11/2018      Your next Anticoagulation Clinic appointment(s)     Oct 11, 2018 10:00 AM CDT   Anticoagulation Visit with  ANTICOAGULATION CLINIC   [facility] [place] ([facility])    [address]  [place] [address]   [phone]              Contact Numbers     Clinic Number:         September 2018 Details    Sun Mon Tue Wed Thu Fri Sat           1                 2               3               4               5               6      5 mg   See details      7      5 mg         8      5 mg           9      5 mg         10      7.5 mg         11      5 mg         12      5 mg         13      5 mg         14      5 mg         15      5 mg           16      5 mg         17      7.5 mg         18      5 mg         19      5 mg         20      5 mg         21      5 mg         22      5 mg           23      5 mg         24      7.5 mg         25      5 mg         26      5 mg         27      5 mg         28      5 mg         29      5 mg           30      5 mg                Date Details   09/06 This INR check               How to take your warfarin dose     To take:  5 mg Take 1 of the 5 mg tablets.    To take:  7.5 mg Take 1.5 of the 5 mg tablets.           October 2018 Details    Sun Mon Tue Wed Thu Fri Sat      1      7.5 mg         2      5 mg         3      5 mg         4      5 mg         5      5 mg         6      5 mg           7      5 mg         8      7.5 mg         9      5 mg         10      5 mg         11            12               13                 14               15               16                17               18               19               20                 21               22               23               24               25               26               27                 28               29               30               31                   Date Details   No additional details    Date of next INR:  10/11/2018         How to take your warfarin dose     To take:  5 mg Take 1 of the 5 mg tablets.    To take:  7.5 mg Take 1.5 of the 5 mg tablets.

## 2018-10-11 ENCOUNTER — ANTICOAGULATION THERAPY VISIT (OUTPATIENT)
Dept: NURSING | Facility: CLINIC | Age: 78
End: 2018-10-11
Payer: COMMERCIAL

## 2018-10-11 LAB — INR POINT OF CARE: 3 (ref 0.86–1.14)

## 2018-10-11 PROCEDURE — 85610 PROTHROMBIN TIME: CPT | Mod: QW

## 2018-10-11 PROCEDURE — 99207 ZZC NO CHARGE NURSE ONLY: CPT

## 2018-10-11 PROCEDURE — 36416 COLLJ CAPILLARY BLOOD SPEC: CPT

## 2018-10-11 NOTE — MR AVS SNAPSHOT
Susan Ferguson   10/11/2018 10:00 AM   Anticoagulation Therapy Visit    Description:  78 year old female   Provider:   ANTICOAGULATION CLINIC   Department:  Cs Nurse           INR as of 10/11/2018     Today's INR 3.0      Anticoagulation Summary as of 10/11/2018     INR goal 2.0-3.0    Today's INR 3.0    Full warfarin instructions 7.5 mg on Mon; 5 mg all other days    Next INR check 11/15/2018      Your next Anticoagulation Clinic appointment(s)     Nov 15, 2018 10:00 AM CST   Anticoagulation Visit with  ANTICOAGULATION CLINIC   [facility] [place] ([facility])    [address]  [place] [address]   [phone]              Contact Numbers     Clinic Number:         October 2018 Details    Sun Mon Tue Wed Thu Fri Sat      1               2               3               4               5               6                 7               8               9               10               11      5 mg   See details      12      5 mg         13      5 mg           14      5 mg         15      7.5 mg         16      5 mg         17      5 mg         18      5 mg         19      5 mg         20      5 mg           21      5 mg         22      7.5 mg         23      5 mg         24      5 mg         25      5 mg         26      5 mg         27      5 mg           28      5 mg         29      7.5 mg         30      5 mg         31      5 mg             Date Details   10/11 This INR check               How to take your warfarin dose     To take:  5 mg Take 1 of the 5 mg tablets.    To take:  7.5 mg Take 1.5 of the 5 mg tablets.           November 2018 Details    Sun Mon Tue Wed Thu Fri Sat         1      5 mg         2      5 mg         3      5 mg           4      5 mg         5      7.5 mg         6      5 mg         7      5 mg         8      5 mg         9      5 mg         10      5 mg           11      5 mg         12      7.5 mg         13      5 mg         14      5 mg         15             16               17                 18               19               20               21               22               23               24                 25               26               27               28               29               30                 Date Details   No additional details    Date of next INR:  11/15/2018         How to take your warfarin dose     To take:  5 mg Take 1 of the 5 mg tablets.    To take:  7.5 mg Take 1.5 of the 5 mg tablets.

## 2018-10-11 NOTE — PROGRESS NOTES
ANTICOAGULATION FOLLOW-UP CLINIC VISIT    Patient Name:  Susan Ferguson  Date:  10/11/2018  Contact Type:  Face to Face    SUBJECTIVE:     Patient Findings     Positives No Problem Findings           OBJECTIVE    INR Protime   Date Value Ref Range Status   10/11/2018 3.0 (A) 0.86 - 1.14 Final     Factor 2 Assay   Date Value Ref Range Status   03/03/2009 19 (L) 60 - 140 % Final       ASSESSMENT / PLAN  INR assessment THER    Recheck INR In: 5 WEEKS    INR Location Clinic      Anticoagulation Summary as of 10/11/2018     INR goal 2.0-3.0    Today's INR 3.0    Warfarin maintenance plan 7.5 mg (5 mg x 1.5) on Mon; 5 mg (5 mg x 1) all other days    Full warfarin instructions 7.5 mg on Mon; 5 mg all other days    Weekly warfarin total 37.5 mg    No change documented Lien Ray RN    Plan last modified Socorro Bryant RN (2/8/2018)    Next INR check 11/15/2018    Target end date       Anticoagulation Episode Summary     INR check location     Preferred lab     Send INR reminders to CS ANTICOAGULATION    Comments       Anticoagulation Care Providers     Provider Role Specialty Phone number    Lety Vela DO [facility] Internal Medicine [phone]            See the Encounter Report to view Anticoagulation Flowsheet and Dosing Calendar (Go to Encounters tab in chart review, and find the Anticoagulation Therapy Visit)    Dosage adjustment made based on physician directed care plan.    Lien Ray RN

## 2018-11-15 ENCOUNTER — ANTICOAGULATION THERAPY VISIT (OUTPATIENT)
Dept: NURSING | Facility: CLINIC | Age: 78
End: 2018-11-15
Payer: COMMERCIAL

## 2018-11-15 LAB — INR POINT OF CARE: 3.2 (ref 0.86–1.14)

## 2018-11-15 PROCEDURE — 85610 PROTHROMBIN TIME: CPT | Mod: QW

## 2018-11-15 PROCEDURE — 99207 ZZC NO CHARGE NURSE ONLY: CPT

## 2018-11-15 PROCEDURE — 36416 COLLJ CAPILLARY BLOOD SPEC: CPT

## 2018-11-15 NOTE — MR AVS SNAPSHOT
Susan Ferguson   11/15/2018 10:00 AM   Anticoagulation Therapy Visit    Description:  78 year old female   Provider:   ANTICOAGULATION CLINIC   Department:  Cs Nurse           INR as of 11/15/2018     Today's INR 3.2!      Anticoagulation Summary as of 11/15/2018     INR goal 2.0-3.0    Today's INR 3.2!    Full warfarin instructions 7.5 mg on Mon; 5 mg all other days    Next INR check 12/13/2018      Your next Anticoagulation Clinic appointment(s)     Dec 13, 2018 10:15 AM CST   Anticoagulation Visit with  ANTICOAGULATION CLINIC   [facility] [place] ([facility])    [address]  [place] [address]   [phone]              Contact Numbers     Clinic Number:         November 2018 Details    Sun Mon Tue Wed Thu Fri Sat         1               2               3                 4               5               6               7               8               9               10                 11               12               13               14               15      5 mg   See details      16      5 mg         17      5 mg           18      5 mg         19      7.5 mg         20      5 mg         21      5 mg         22      5 mg         23      5 mg         24      5 mg           25      5 mg         26      7.5 mg         27      5 mg         28      5 mg         29      5 mg         30      5 mg           Date Details   11/15 This INR check               How to take your warfarin dose     To take:  5 mg Take 1 of the 5 mg tablets.    To take:  7.5 mg Take 1.5 of the 5 mg tablets.           December 2018 Details    Sun Mon Tue Wed Thu Fri Sat           1      5 mg           2      5 mg         3      7.5 mg         4      5 mg         5      5 mg         6      5 mg         7      5 mg         8      5 mg           9      5 mg         10      7.5 mg         11      5 mg         12      5 mg         13            14               15                 16               17               18                19               20               21               22                 23               24               25               26               27               28               29                 30               31                     Date Details   No additional details    Date of next INR:  12/13/2018         How to take your warfarin dose     To take:  5 mg Take 1 of the 5 mg tablets.    To take:  7.5 mg Take 1.5 of the 5 mg tablets.

## 2018-11-15 NOTE — PROGRESS NOTES
"  ANTICOAGULATION FOLLOW-UP CLINIC VISIT    Patient Name:  Susan Ferguson  Date:  11/15/2018  Contact Type:  Face to Face    SUBJECTIVE:     Patient Findings     Positives Change in diet/appetite (Hasn't eaten her usual greens. Will resume. ), Inflammation (Chronic pain in behind right knee. Reports \"hematoma\".  Taking Tylenol)           OBJECTIVE    INR Protime   Date Value Ref Range Status   11/15/2018 3.2 (A) 0.86 - 1.14 Final     Factor 2 Assay   Date Value Ref Range Status   03/03/2009 19 (L) 60 - 140 % Final       ASSESSMENT / PLAN  INR assessment SUPRA    Recheck INR In: 4 WEEKS    INR Location Clinic      Anticoagulation Summary as of 11/15/2018     INR goal 2.0-3.0    Today's INR 3.2!    Warfarin maintenance plan 7.5 mg (5 mg x 1.5) on Mon; 5 mg (5 mg x 1) all other days    Full warfarin instructions 7.5 mg on Mon; 5 mg all other days    Weekly warfarin total 37.5 mg    No change documented Lien Ray RN    Plan last modified Socorro Bryant RN (2/8/2018)    Next INR check 12/13/2018    Target end date       Anticoagulation Episode Summary     INR check location     Preferred lab     Send INR reminders to CS ANTICOAGULATION    Comments       Anticoagulation Care Providers     Provider Role Specialty Phone number    Lety Vela,  [facility] Internal Medicine [phone]            See the Encounter Report to view Anticoagulation Flowsheet and Dosing Calendar (Go to Encounters tab in chart review, and find the Anticoagulation Therapy Visit)    Dosage adjustment made based on physician directed care plan.  Pt aware if signs of clotting (pain, tenderness, swelling, color change in leg or arm, SOB) and bleeding occur (blood in stool, urine, large bruising, bleeding gums, nosebleeds) to have INR check sooner. If sx severe report to ER or concerned for stroke call 911. If general questions or concerns arise, call clinic.      Lien Ray RN               "

## 2018-12-13 ENCOUNTER — ANTICOAGULATION THERAPY VISIT (OUTPATIENT)
Dept: NURSING | Facility: CLINIC | Age: 78
End: 2018-12-13
Payer: COMMERCIAL

## 2018-12-13 ENCOUNTER — TELEPHONE (OUTPATIENT)
Dept: FAMILY MEDICINE | Facility: CLINIC | Age: 78
End: 2018-12-13

## 2018-12-13 DIAGNOSIS — Z86.718 HISTORY OF DEEP VENOUS THROMBOSIS: Primary | ICD-10-CM

## 2018-12-13 LAB — INR POINT OF CARE: 2.2 (ref 0.86–1.14)

## 2018-12-13 PROCEDURE — 85610 PROTHROMBIN TIME: CPT | Mod: QW

## 2018-12-13 PROCEDURE — 99207 ZZC NO CHARGE NURSE ONLY: CPT

## 2018-12-13 PROCEDURE — 36416 COLLJ CAPILLARY BLOOD SPEC: CPT

## 2018-12-13 NOTE — TELEPHONE ENCOUNTER
Please sign annual INR Referral--pended.   Please CLOSE encounter after completed.     Thank you,  Lien VALLEJO RN,BSN

## 2018-12-13 NOTE — PROGRESS NOTES
ANTICOAGULATION FOLLOW-UP CLINIC VISIT    Patient Name:  Susan Ferguson  Date:  2018  Contact Type:  Face to Face    SUBJECTIVE:        OBJECTIVE    INR Protime   Date Value Ref Range Status   2018 2.2 (A) 0.86 - 1.14 Final     Factor 2 Assay   Date Value Ref Range Status   2009 19 (L) 60 - 140 % Final       ASSESSMENT / PLAN  INR assessment THER    Recheck INR In: 5 WEEKS    INR Location Clinic      Anticoagulation Summary  As of 2018    INR goal:   2.0-3.0   TTR:   58.3 % (9.9 mo)   INR used for dosin.2 (2018)   Warfarin maintenance plan:   7.5 mg (5 mg x 1.5) every Mon; 5 mg (5 mg x 1) all other days   Full warfarin instructions:   7.5 mg every Mon; 5 mg all other days   Weekly warfarin total:   37.5 mg   No change documented:   Lien Ray RN   Plan last modified:   Socorro Bryant RN (2018)   Next INR check:   2019   Target end date:       Indications    DVT (deep venous thrombosis) (H) [I82.409]             Anticoagulation Episode Summary     INR check location:       Preferred lab:       Send INR reminders to:   CS ANTICOAGULATION    Comments:         Anticoagulation Care Providers     Provider Role Specialty Phone number    Dane Lety Alexa,  [facility] Internal Medicine [phone]            See the Encounter Report to view Anticoagulation Flowsheet and Dosing Calendar (Go to Encounters tab in chart review, and find the Anticoagulation Therapy Visit)    Dosage adjustment made based on physician directed care plan.    Lien Ray RN

## 2019-01-17 ENCOUNTER — ANTICOAGULATION THERAPY VISIT (OUTPATIENT)
Dept: NURSING | Facility: CLINIC | Age: 79
End: 2019-01-17
Payer: COMMERCIAL

## 2019-01-17 LAB — INR POINT OF CARE: 2.1 (ref 0.86–1.14)

## 2019-01-17 PROCEDURE — 85610 PROTHROMBIN TIME: CPT | Mod: QW

## 2019-01-17 PROCEDURE — 99207 ZZC NO CHARGE NURSE ONLY: CPT

## 2019-01-17 PROCEDURE — 36416 COLLJ CAPILLARY BLOOD SPEC: CPT

## 2019-01-17 NOTE — PROGRESS NOTES
ANTICOAGULATION FOLLOW-UP CLINIC VISIT    Patient Name:  Susan Ferguson  Date:  2019  Contact Type:  Face to Face    SUBJECTIVE:     Patient Findings     Positives:   No Problem Findings    Comments:   Patient hasn't been eating greens and plans to resume those in her diet.  Also, patient plans to start drinking SlimFast daily which has Vit K in it.  Will increase Warfarin dose slightly to compensate for increased Vit K in diet.            OBJECTIVE    INR Protime   Date Value Ref Range Status   2019 2.1 (A) 0.86 - 1.14 Final     Factor 2 Assay   Date Value Ref Range Status   2009 19 (L) 60 - 140 % Final       ASSESSMENT / PLAN  INR assessment THER    Recheck INR In: 4 WEEKS    INR Location Clinic      Anticoagulation Summary  As of 2019    INR goal:   2.0-3.0   TTR:   62.7 % (11.1 mo)   INR used for dosin.1 (2019)   Warfarin maintenance plan:   7.5 mg (5 mg x 1.5) every Mon, Fri; 5 mg (5 mg x 1) all other days   Full warfarin instructions:   7.5 mg every Mon, Fri; 5 mg all other days   Weekly warfarin total:   40 mg   Plan last modified:   Lien Ray RN (2019)   Next INR check:   2019   Target end date:       Indications    DVT (deep venous thrombosis) (H) [I82.409]             Anticoagulation Episode Summary     INR check location:       Preferred lab:       Send INR reminders to:    ANTICOAGULATION    Comments:         Anticoagulation Care Providers     Provider Role Specialty Phone number    Lety Vela AlexaDO [facility] Internal Medicine [phone]            See the Encounter Report to view Anticoagulation Flowsheet and Dosing Calendar (Go to Encounters tab in chart review, and find the Anticoagulation Therapy Visit)    Dosage adjustment made based on physician directed care plan.    Lien Ray RN

## 2019-02-14 ENCOUNTER — ANTICOAGULATION THERAPY VISIT (OUTPATIENT)
Dept: NURSING | Facility: CLINIC | Age: 79
End: 2019-02-14
Payer: COMMERCIAL

## 2019-02-14 LAB — INR POINT OF CARE: 3.7 (ref 0.86–1.14)

## 2019-02-14 PROCEDURE — 36416 COLLJ CAPILLARY BLOOD SPEC: CPT

## 2019-02-14 PROCEDURE — 99207 ZZC NO CHARGE NURSE ONLY: CPT

## 2019-02-14 PROCEDURE — 85610 PROTHROMBIN TIME: CPT | Mod: QW

## 2019-02-14 NOTE — PROGRESS NOTES
ANTICOAGULATION FOLLOW-UP CLINIC VISIT    Patient Name:  Susan Ferguson  Date:  2/14/2019  Contact Type:  Face to Face    SUBJECTIVE:     Patient Findings     Comments:   At last INR visit, patient planned to drink Boost daily which has a lot of Vit K.    Then--patient reports today that she hasn't been drinking it as frequently as she had originally thought.  Hasn't had any Boost for the last several days.            OBJECTIVE    INR Protime   Date Value Ref Range Status   02/14/2019 3.7 (A) 0.86 - 1.14 Final     Factor 2 Assay   Date Value Ref Range Status   03/03/2009 19 (L) 60 - 140 % Final       ASSESSMENT / PLAN  INR assessment SUPRA    Recheck INR In: 4 WEEKS    INR Location Clinic      Anticoagulation Summary  As of 2/14/2019    INR goal:   2.0-3.0   TTR:   62.2 % (1 y)   INR used for dosing:   3.7! (2/14/2019)   Warfarin maintenance plan:   7.5 mg (5 mg x 1.5) every Mon; 5 mg (5 mg x 1) all other days   Full warfarin instructions:   7.5 mg every Mon; 5 mg all other days   Weekly warfarin total:   37.5 mg   Plan last modified:   Lien Ray RN (2/14/2019)   Next INR check:   3/14/2019   Target end date:       Indications    DVT (deep venous thrombosis) (H) [I82.409]             Anticoagulation Episode Summary     INR check location:       Preferred lab:       Send INR reminders to:    ANTICOAGULATION    Comments:         Anticoagulation Care Providers     Provider Role Specialty Phone number    Lety Vela AlexaDO [facility] Internal Medicine [phone]            See the Encounter Report to view Anticoagulation Flowsheet and Dosing Calendar (Go to Encounters tab in chart review, and find the Anticoagulation Therapy Visit)    Dosage adjustment made based on physician directed care plan.    Some signs and symptoms of bleeding include: Nose bleed or cut that does not stop bleeding in 10 minutes, bleeding of the gums, vomiting (will look like coffee grounds) or coughing up blood, unusual, easy or  large areas of bruising, increased or unexpected vaginal bleeding or increased menstrual flow, red or black stools, red or orange urine, prolonged or severe headache, pale skin, unusual or constant tiredness.  If you have these please call 911 or seek medical care immediately.       Lein Ray RN

## 2019-03-14 ENCOUNTER — ANTICOAGULATION THERAPY VISIT (OUTPATIENT)
Dept: NURSING | Facility: CLINIC | Age: 79
End: 2019-03-14
Payer: COMMERCIAL

## 2019-03-14 LAB — INR POINT OF CARE: 3 (ref 0.86–1.14)

## 2019-03-14 PROCEDURE — 85610 PROTHROMBIN TIME: CPT | Mod: QW

## 2019-03-14 PROCEDURE — 36416 COLLJ CAPILLARY BLOOD SPEC: CPT

## 2019-03-14 PROCEDURE — 99207 ZZC NO CHARGE NURSE ONLY: CPT

## 2019-03-14 NOTE — PROGRESS NOTES
ANTICOAGULATION FOLLOW-UP CLINIC VISIT    Patient Name:  Susan Ferguson  Date:  3/14/2019  Contact Type:  Face to Face    SUBJECTIVE:        OBJECTIVE    INR Protime   Date Value Ref Range Status   03/14/2019 3.0 (A) 0.86 - 1.14 Final     Factor 2 Assay   Date Value Ref Range Status   03/03/2009 19 (L) 60 - 140 % Final       ASSESSMENT / PLAN  INR assessment THER    Recheck INR In: 4 WEEKS    INR Location Clinic      Anticoagulation Summary  As of 3/14/2019    INR goal:   2.0-3.0   TTR:   57.7 % (1.1 y)   INR used for dosing:   3.0 (3/14/2019)   Warfarin maintenance plan:   7.5 mg (5 mg x 1.5) every Mon; 5 mg (5 mg x 1) all other days   Full warfarin instructions:   7.5 mg every Mon; 5 mg all other days   Weekly warfarin total:   37.5 mg   No change documented:   Lien Ray RN   Plan last modified:   Lien Ray RN (2/14/2019)   Next INR check:   4/11/2019   Target end date:       Indications    DVT (deep venous thrombosis) (H) [I82.409]             Anticoagulation Episode Summary     INR check location:       Preferred lab:       Send INR reminders to:   CS ANTICOAGULATION    Comments:         Anticoagulation Care Providers     Provider Role Specialty Phone number    Vela Lety Alexa,  [facility] Internal Medicine [phone]            See the Encounter Report to view Anticoagulation Flowsheet and Dosing Calendar (Go to Encounters tab in chart review, and find the Anticoagulation Therapy Visit)    Dosage adjustment made based on physician directed care plan.    Lien Ray RN

## 2019-04-22 ENCOUNTER — ANTICOAGULATION THERAPY VISIT (OUTPATIENT)
Dept: NURSING | Facility: CLINIC | Age: 79
End: 2019-04-22
Payer: COMMERCIAL

## 2019-04-22 DIAGNOSIS — I82.409 DVT (DEEP VENOUS THROMBOSIS) (H): ICD-10-CM

## 2019-04-22 LAB — INR POINT OF CARE: 2.4 (ref 0.86–1.14)

## 2019-04-22 PROCEDURE — 99207 ZZC NO CHARGE NURSE ONLY: CPT

## 2019-04-22 PROCEDURE — 85610 PROTHROMBIN TIME: CPT | Mod: QW

## 2019-04-22 PROCEDURE — 36416 COLLJ CAPILLARY BLOOD SPEC: CPT

## 2019-04-22 NOTE — PROGRESS NOTES
ANTICOAGULATION FOLLOW-UP CLINIC VISIT    Patient Name:  Susan Ferguson  Date:  2019  Contact Type:  Face to Face    SUBJECTIVE:     Patient Findings            OBJECTIVE    INR Protime   Date Value Ref Range Status   2019 2.4 (A) 0.86 - 1.14 Final     Factor 2 Assay   Date Value Ref Range Status   2009 19 (L) 60 - 140 % Final       ASSESSMENT / PLAN  INR assessment THER    Recheck INR In: 4 WEEKS    INR Location Clinic      Anticoagulation Summary  As of 2019    INR goal:   2.0-3.0   TTR:   61.6 % (1.2 y)   INR used for dosin.4 (2019)   Warfarin maintenance plan:   7.5 mg (5 mg x 1.5) every Mon; 5 mg (5 mg x 1) all other days   Full warfarin instructions:   7.5 mg every Mon; 5 mg all other days   Weekly warfarin total:   37.5 mg   Plan last modified:   Lien Ray RN (2019)   Next INR check:   2019   Target end date:       Indications    DVT (deep venous thrombosis) (H) [I82.409]             Anticoagulation Episode Summary     INR check location:       Preferred lab:       Send INR reminders to:   CS ANTICOAGULATION    Comments:         Anticoagulation Care Providers     Provider Role Specialty Phone number    Lety Vela DO [facility] Internal Medicine [phone]            See the Encounter Report to view Anticoagulation Flowsheet and Dosing Calendar (Go to Encounters tab in chart review, and find the Anticoagulation Therapy Visit)    No changes in meds/diet. No missed/extra warfarin doses. No unusual bruising/bleeding or other changes. Pt will continue same warfarin dose and recheck INR in 4 week(s), or sooner if concerns. Advised pt Dr. Vela is no longer with our clinic, so she will need to find a new PCP for ongoing management of INRs.    Pt aware if signs of clotting (pain, tenderness, swelling, color change in leg or arm, SOB) and bleeding occur (blood in stool, urine, large bruising, bleeding gums, nosebleeds) to have INR check sooner. If sx  severe report to ER or concerned for stroke call 911. If general questions or concerns arise, call clinic.    Dolores Wang RN

## 2019-05-23 ENCOUNTER — ANTICOAGULATION THERAPY VISIT (OUTPATIENT)
Dept: NURSING | Facility: CLINIC | Age: 79
End: 2019-05-23
Payer: COMMERCIAL

## 2019-05-23 DIAGNOSIS — I82.409 DVT (DEEP VENOUS THROMBOSIS) (H): ICD-10-CM

## 2019-05-23 LAB — INR POINT OF CARE: 1.8 (ref 0.86–1.14)

## 2019-05-23 PROCEDURE — 85610 PROTHROMBIN TIME: CPT | Mod: QW

## 2019-05-23 PROCEDURE — 36416 COLLJ CAPILLARY BLOOD SPEC: CPT

## 2019-05-23 PROCEDURE — 99207 ZZC NO CHARGE NURSE ONLY: CPT

## 2019-05-23 RX ORDER — WARFARIN SODIUM 5 MG/1
5-7.5 TABLET ORAL DAILY
Qty: 135 TABLET | Refills: 3 | Status: SHIPPED | OUTPATIENT
Start: 2019-05-23 | End: 2020-08-31

## 2019-05-23 NOTE — PROGRESS NOTES
ANTICOAGULATION FOLLOW-UP CLINIC VISIT    Patient Name:  Susan Ferguson  Date:  2019  Contact Type:  Face to Face    SUBJECTIVE:  Patient Findings     Comments:   Patient denies any identifiable changes that caused the subtherapeutic INR.         Clinical Outcomes     Comments:   Patient denies any identifiable changes that caused the subtherapeutic INR.            OBJECTIVE    INR Protime   Date Value Ref Range Status   2019 1.8 (A) 0.86 - 1.14 Final     Factor 2 Assay   Date Value Ref Range Status   2009 19 (L) 60 - 140 % Final       ASSESSMENT / PLAN  INR assessment SUB    Recheck INR In: 4 WEEKS    INR Location Clinic      Anticoagulation Summary  As of 2019    INR goal:   2.0-3.0   TTR:   61.9 % (1.3 y)   INR used for dosin.8! (2019)   Warfarin maintenance plan:   7.5 mg (5 mg x 1.5) every Mon; 5 mg (5 mg x 1) all other days   Full warfarin instructions:   : 7.5 mg; Otherwise 7.5 mg every Mon; 5 mg all other days   Weekly warfarin total:   37.5 mg   Plan last modified:   Lien Ray RN (2019)   Next INR check:   2019   Target end date:       Indications    DVT (deep venous thrombosis) (H) [I82.409]             Anticoagulation Episode Summary     INR check location:       Preferred lab:       Send INR reminders to:    ANTICOAGULATION    Comments:         Anticoagulation Care Providers     Provider Role Specialty Phone number    Lety VelaDO [facility] Internal Medicine [phone]            See the Encounter Report to view Anticoagulation Flowsheet and Dosing Calendar (Go to Encounters tab in chart review, and find the Anticoagulation Therapy Visit)    Dosage adjustment made based on physician directed care plan.    Patient will avoid dark greens x 2-3 days, then resume normal diet.     Some signs and symptoms of clots include: pain or tenderness in arm or leg, swelling in arm or leg, changes in skin color, or area is warm to touch, shortness or  breath, trouble breathing.  Numbness or weakness especially on 1 side of the body, sudden trouble speaking or swallowing, sudden trouble seeing, sudden confusion, dizzy spells or headache.  If you have these please call 911 or seek medical care immediately.      Scheduled Physical appointment for patient with Dr Wan--and to [facility] care.  Previous Dr Vela patient.      Prescription approved per [facility].    Lien VALLEJO RN,BSN

## 2019-06-20 ENCOUNTER — OFFICE VISIT (OUTPATIENT)
Dept: FAMILY MEDICINE | Facility: CLINIC | Age: 79
End: 2019-06-20
Payer: COMMERCIAL

## 2019-06-20 ENCOUNTER — ANTICOAGULATION THERAPY VISIT (OUTPATIENT)
Dept: NURSING | Facility: CLINIC | Age: 79
End: 2019-06-20
Payer: COMMERCIAL

## 2019-06-20 VITALS
BODY MASS INDEX: 35.32 KG/M2 | DIASTOLIC BLOOD PRESSURE: 76 MMHG | SYSTOLIC BLOOD PRESSURE: 110 MMHG | WEIGHT: 212 LBS | OXYGEN SATURATION: 97 % | HEIGHT: 65 IN | HEART RATE: 69 BPM | TEMPERATURE: 97.8 F

## 2019-06-20 DIAGNOSIS — L82.1 SEBORRHEIC KERATOSIS: ICD-10-CM

## 2019-06-20 DIAGNOSIS — E04.1 THYROID NODULE: ICD-10-CM

## 2019-06-20 DIAGNOSIS — Z13.6 CARDIOVASCULAR SCREENING; LDL GOAL LESS THAN 100: ICD-10-CM

## 2019-06-20 DIAGNOSIS — I10 ESSENTIAL HYPERTENSION: ICD-10-CM

## 2019-06-20 DIAGNOSIS — Z00.00 ROUTINE GENERAL MEDICAL EXAMINATION AT A HEALTH CARE FACILITY: Primary | ICD-10-CM

## 2019-06-20 DIAGNOSIS — Z86.718 HISTORY OF DEEP VENOUS THROMBOSIS: ICD-10-CM

## 2019-06-20 DIAGNOSIS — D68.51 FACTOR V LEIDEN (H): ICD-10-CM

## 2019-06-20 DIAGNOSIS — M85.80 OSTEOPENIA, UNSPECIFIED LOCATION: ICD-10-CM

## 2019-06-20 DIAGNOSIS — I83.90 ASYMPTOMATIC VARICOSE VEINS: ICD-10-CM

## 2019-06-20 DIAGNOSIS — R79.89 LOW TSH LEVEL: ICD-10-CM

## 2019-06-20 LAB
ALBUMIN UR-MCNC: NEGATIVE MG/DL
APPEARANCE UR: CLEAR
BACTERIA #/AREA URNS HPF: ABNORMAL /HPF
BILIRUB UR QL STRIP: NEGATIVE
COLOR UR AUTO: YELLOW
ERYTHROCYTE [DISTWIDTH] IN BLOOD BY AUTOMATED COUNT: 13.4 % (ref 10–15)
GLUCOSE UR STRIP-MCNC: NEGATIVE MG/DL
HCT VFR BLD AUTO: 45.2 % (ref 35–47)
HGB BLD-MCNC: 15 G/DL (ref 11.7–15.7)
HGB UR QL STRIP: ABNORMAL
INR POINT OF CARE: 2.4 (ref 0.86–1.14)
KETONES UR STRIP-MCNC: NEGATIVE MG/DL
LEUKOCYTE ESTERASE UR QL STRIP: ABNORMAL
MCH RBC QN AUTO: 30 PG (ref 26.5–33)
MCHC RBC AUTO-ENTMCNC: 33.2 G/DL (ref 31.5–36.5)
MCV RBC AUTO: 90 FL (ref 78–100)
NITRATE UR QL: NEGATIVE
NON-SQ EPI CELLS #/AREA URNS LPF: ABNORMAL /LPF
PH UR STRIP: 6.5 PH (ref 5–7)
PLATELET # BLD AUTO: 270 10E9/L (ref 150–450)
RBC # BLD AUTO: 5 10E12/L (ref 3.8–5.2)
RBC #/AREA URNS AUTO: ABNORMAL /HPF
SOURCE: ABNORMAL
SP GR UR STRIP: 1.02 (ref 1–1.03)
UROBILINOGEN UR STRIP-ACNC: 0.2 EU/DL (ref 0.2–1)
WBC # BLD AUTO: 4.3 10E9/L (ref 4–11)
WBC #/AREA URNS AUTO: ABNORMAL /HPF

## 2019-06-20 PROCEDURE — 80053 COMPREHEN METABOLIC PANEL: CPT | Performed by: INTERNAL MEDICINE

## 2019-06-20 PROCEDURE — 80061 LIPID PANEL: CPT | Performed by: INTERNAL MEDICINE

## 2019-06-20 PROCEDURE — 85610 PROTHROMBIN TIME: CPT | Mod: QW

## 2019-06-20 PROCEDURE — 85027 COMPLETE CBC AUTOMATED: CPT | Performed by: INTERNAL MEDICINE

## 2019-06-20 PROCEDURE — 84443 ASSAY THYROID STIM HORMONE: CPT | Performed by: INTERNAL MEDICINE

## 2019-06-20 PROCEDURE — 36416 COLLJ CAPILLARY BLOOD SPEC: CPT

## 2019-06-20 PROCEDURE — 82306 VITAMIN D 25 HYDROXY: CPT | Performed by: INTERNAL MEDICINE

## 2019-06-20 PROCEDURE — 99207 C PAF COMPLETED  NO CHARGE: CPT | Performed by: INTERNAL MEDICINE

## 2019-06-20 PROCEDURE — 84439 ASSAY OF FREE THYROXINE: CPT | Performed by: INTERNAL MEDICINE

## 2019-06-20 PROCEDURE — 81001 URINALYSIS AUTO W/SCOPE: CPT | Performed by: INTERNAL MEDICINE

## 2019-06-20 PROCEDURE — G0439 PPPS, SUBSEQ VISIT: HCPCS | Performed by: INTERNAL MEDICINE

## 2019-06-20 ASSESSMENT — ACTIVITIES OF DAILY LIVING (ADL): CURRENT_FUNCTION: NO ASSISTANCE NEEDED

## 2019-06-20 ASSESSMENT — MIFFLIN-ST. JEOR: SCORE: 1437.51

## 2019-06-20 NOTE — PROGRESS NOTES
ANTICOAGULATION FOLLOW-UP CLINIC VISIT    Patient Name:  Susan Ferguson  Date:  2019  Contact Type:  Face to Face    SUBJECTIVE:  Patient Findings     Comments:   The patient was assessed for diet, medication, and activity level changes, missed or extra doses, bruising or bleeding, with no problem findings.          Clinical Outcomes     Comments:   The patient was assessed for diet, medication, and activity level changes, missed or extra doses, bruising or bleeding, with no problem findings.             OBJECTIVE    INR Protime   Date Value Ref Range Status   2019 2.4 (A) 0.86 - 1.14 Final     Factor 2 Assay   Date Value Ref Range Status   2009 19 (L) 60 - 140 % Final       ASSESSMENT / PLAN  INR assessment THER    Recheck INR In: 4 WEEKS    INR Location Clinic      Anticoagulation Summary  As of 2019    INR goal:   2.0-3.0   TTR:   62.2 % (1.3 y)   INR used for dosin.4 (2019)   Warfarin maintenance plan:   7.5 mg (5 mg x 1.5) every Mon; 5 mg (5 mg x 1) all other days   Full warfarin instructions:   7.5 mg every Mon; 5 mg all other days   Weekly warfarin total:   37.5 mg   Plan last modified:   Lien Ray RN (2019)   Next INR check:   2019   Target end date:       Indications    DVT (deep venous thrombosis) (H) [I82.409]             Anticoagulation Episode Summary     INR check location:       Preferred lab:       Send INR reminders to:    ANTICOAGULATION    Comments:         Anticoagulation Care Providers     Provider Role Specialty Phone number    VelaLety,  [facility] Internal Medicine [phone]            See the Encounter Report to view Anticoagulation Flowsheet and Dosing Calendar (Go to Encounters tab in chart review, and find the Anticoagulation Therapy Visit)    Dosage adjustment made based on physician directed care plan.    Vesna Aguila RN

## 2019-06-20 NOTE — PROGRESS NOTES
"SUBJECTIVE:   Susan Ferguson is a 79 year old female who presents for Preventive Visit.      Are you in the first 12 months of your Medicare coverage?  No    Healthy Habits:     In general, how would you rate your overall health?  Good    Frequency of exercise:  2-3 days/week (swimming and aerobics)    Duration of exercise:  15-30 minutes    Do you usually eat at least 4 servings of fruit and vegetables a day, include whole grains    & fiber and avoid regularly eating high fat or \"junk\" foods?  Yes    Taking medications regularly:  Yes    Barriers to taking medications:  None    Medication side effects:  None    Ability to successfully perform activities of daily living:  No assistance needed    Home Safety:  No safety concerns identified    Hearing Impairment:  No hearing concerns    In the past 6 months, have you been bothered by leaking of urine? Yes    In general, how would you rate your overall mental or emotional health?  Very good      PHQ-2 Total Score: 1    Additional concerns today:  No    Do you feel safe in your environment? YES    Do you have a Health Care Directive? No: Advance care planning reviewed with patient; information given to patient to review.      Fall risk  Fallen 2 or more times in the past year?: Yes  Any fall with injury in the past year?: No    Cognitive Screening   1) Repeat 3 items (Leader, Season, Table)    2) Clock draw: NORMAL  3) 3 item recall: Recalls 3 objects  Results:     Mini-CogTM Copyright ELMO Mishra. Licensed by the author for use in Health system; reprinted with permission (stalin@.[facility]). All rights reserved.      Do you have sleep apnea, excessive snoring or daytime drowsiness?: no    Reviewed and updated as needed this visit by clinical staff  Tobacco  Allergies  Meds         Reviewed and updated as needed this visit by Provider        Social History     Tobacco Use     Smoking status: Never Smoker     Smokeless tobacco: Never Used   Substance Use Topics "     Alcohol use: No     Alcohol/week: 0.0 oz     If you drink alcohol do you typically have >3 drinks per day or >7 drinks per week? No    No flowsheet data found.          Current providers sharing in care for this patient include:   Patient Care Team:  Ange Burkett as PCP - General  Lety Vela DO as Assigned PCP    The following health maintenance items are reviewed in Epic and correct as of today:  Health Maintenance   Topic Date Due     ZOSTER IMMUNIZATION (1 of 2) 06/10/1990     OP ANNUAL INR REFERRAL  01/20/2017     ADVANCE CARE PLANNING  02/22/2017     MEDICARE ANNUAL WELLNESS VISIT  01/16/2019     TSH W/FREE T4 REFLEX  01/16/2019     INFLUENZA VACCINE (Season Ended) 09/01/2019     DEXA  11/14/2019     LIPID  01/16/2023     DTAP/TDAP/TD IMMUNIZATION (3 - Td) 03/18/2026     PHQ-2  Completed     IPV IMMUNIZATION  Aged Out     MENINGITIS IMMUNIZATION  Aged Out     Current Outpatient Medications   Medication Sig Dispense Refill     acetaminophen (TYLENOL) 325 MG tablet Take 2 tablets (650 mg) by mouth every 4 hours as needed for mild pain       ascorbic acid (VITAMIN C) 1000 MG TABS Take 1,000 mg by mouth daily       Cholecalciferol (VITAMIN D) 2000 UNITS tablet Take 2,000 Units by mouth daily       JANTOVEN 5 MG tablet Take 1-1.5 tablets (5-7.5 mg) by mouth daily As directed by INR clinic - BRAND only 135 tablet 3     MOISTURIZING LOTION EX prn       MULTI-VITAMIN OR TABS 1 TABLET DAILY 30 0     vitamin E 400 UNIT capsule Take 400 Units by mouth 2 times daily       Allergies   Allergen Reactions     Codeine      Keflex [Cephalexin Monohydrate] Rash     Latex Dermatitis and Itching     Percocet [Oxycodone-Acetaminophen] Nausea and Vomiting     Sulfa Drugs          Review of Systems  CONSTITUTIONAL: NEGATIVE for fever, chills, change in weight  INTEGUMENTARY/SKIN: NEGATIVE for worrisome rashes, moles or lesions  EYES: NEGATIVE for vision changes or irritation  ENT/MOUTH: NEGATIVE for  "ear, mouth and throat problems  RESP: NEGATIVE for significant cough or SOB  BREAST: NEGATIVE for masses, tenderness or discharge  CV: NEGATIVE for chest pain, palpitations or peripheral edema  GI: NEGATIVE for nausea, abdominal pain, heartburn, or change in bowel habits  : NEGATIVE for frequency, dysuria, or hematuria  MUSCULOSKELETAL: NEGATIVE for significant arthralgias or myalgia  NEURO: NEGATIVE for weakness, dizziness or paresthesias  ENDOCRINE: NEGATIVE for temperature intolerance, skin/hair changes  HEME: NEGATIVE for bleeding problems  PSYCHIATRIC: NEGATIVE for changes in mood or affect    OBJECTIVE:   Breastfeeding? No  Estimated body mass index is 37.56 kg/m  as calculated from the following:    Height as of 1/16/18: 1.626 m (5' 4\").    Weight as of 1/16/18: 99.2 kg (218 lb 12.8 oz).  Physical Exam  GENERAL APPEARANCE: healthy, alert and no distress  EYES: Eyes grossly normal to inspection, PERRL and conjunctivae and sclerae normal  HENT: ear canals and TM's normal, nose and mouth without ulcers or lesions, oropharynx clear and oral mucous membranes moist  NECK: no adenopathy, no asymmetry, masses, or scars and thyroid normal to palpation  RESP: lungs clear to auscultation - no rales, rhonchi or wheezes  BREAST: normal without masses, tenderness or nipple discharge and no palpable axillary masses or adenopathy  CV: regular rate and rhythm, normal S1 S2, no S3 or S4, no murmur, click or rub, no peripheral edema and peripheral pulses strong  ABDOMEN: soft, nontender, no hepatosplenomegaly, no masses and bowel sounds normal  MS: no musculoskeletal defects are noted and gait is age appropriate without ataxia  SKIN: no suspicious lesions or rashes  NEURO: Normal strength and tone, sensory exam grossly normal, mentation intact and speech normal  PSYCH: mentation appears normal and affect normal/bright        ASSESSMENT / PLAN:   1. Routine general medical examination at a health care facility    - PAF " "COMPLETED  - UA reflex to Microscopic and Culture  - CBC with platelets  - Comprehensive metabolic panel  - Lipid panel reflex to direct LDL Fasting  - TSH with free T4 reflex  - Vitamin D Deficiency  - Urine Microscopic  - T4 free  - T4 free    2. Thyroid nodule    - TSH with free T4 reflex    3. Low TSH level    - TSH with free T4 reflex    4. History of deep venous thrombosis      5. Factor V Leiden (H)      6. Essential hypertension    - UA reflex to Microscopic and Culture  - CBC with platelets  - Comprehensive metabolic panel    7. Osteopenia, unspecified location    - Vitamin D Deficiency    8. Seborrheic keratosis      9. Asymptomatic varicose veins      10. CARDIOVASCULAR SCREENING; LDL GOAL LESS THAN 100    - Lipid panel reflex to direct LDL Fasting    End of Life Planning:  Patient currently has an advanced directive:     COUNSELING:      Estimated body mass index is 37.56 kg/m  as calculated from the following:    Height as of 1/16/18: 1.626 m (5' 4\").    Weight as of 1/16/18: 99.2 kg (218 lb 12.8 oz).         reports that she has never smoked. She has never used smokeless tobacco.      Appropriate preventive services were discussed with this patient, including applicable screening as appropriate for cardiovascular disease, diabetes, osteopenia/osteoporosis, and glaucoma.  As appropriate for age/gender, discussed screening for colorectal cancer, prostate cancer, breast cancer, and cervical cancer. Checklist reviewing preventive services available has been given to the patient.    Reviewed patients plan of care and provided an AVS. The  clau Davis meets the Care Plan requirement. This Care Plan has been established and reviewed with the .    Counseling Resources:  ATP IV Guidelines  Pooled Cohorts Equation Calculator  Breast Cancer Risk Calculator  FRAX Risk Assessment  ICSI Preventive Guidelines  Dietary Guidelines for Americans, 2010  USDA's MyPlate  ASA Prophylaxis  Lung CA Screening  Return to clinic " in 6 months  Jimi Wan MD  [facility]    Identified Health Risks:

## 2019-06-21 LAB
ALBUMIN SERPL-MCNC: 3.8 G/DL (ref 3.4–5)
ALP SERPL-CCNC: 63 U/L (ref 40–150)
ALT SERPL W P-5'-P-CCNC: 16 U/L (ref 0–50)
ANION GAP SERPL CALCULATED.3IONS-SCNC: 8 MMOL/L (ref 3–14)
AST SERPL W P-5'-P-CCNC: 15 U/L (ref 0–45)
BILIRUB SERPL-MCNC: 0.6 MG/DL (ref 0.2–1.3)
BUN SERPL-MCNC: 18 MG/DL (ref 7–30)
CALCIUM SERPL-MCNC: 8.8 MG/DL (ref 8.5–10.1)
CHLORIDE SERPL-SCNC: 107 MMOL/L (ref 94–109)
CHOLEST SERPL-MCNC: 229 MG/DL
CO2 SERPL-SCNC: 27 MMOL/L (ref 20–32)
CREAT SERPL-MCNC: 0.54 MG/DL (ref 0.52–1.04)
DEPRECATED CALCIDIOL+CALCIFEROL SERPL-MC: 35 UG/L (ref 20–75)
GFR SERPL CREATININE-BSD FRML MDRD: 90 ML/MIN/{1.73_M2}
GLUCOSE SERPL-MCNC: 101 MG/DL (ref 70–99)
HDLC SERPL-MCNC: 67 MG/DL
LDLC SERPL CALC-MCNC: 147 MG/DL
NONHDLC SERPL-MCNC: 162 MG/DL
POTASSIUM SERPL-SCNC: 4.1 MMOL/L (ref 3.4–5.3)
PROT SERPL-MCNC: 7.1 G/DL (ref 6.8–8.8)
SODIUM SERPL-SCNC: 142 MMOL/L (ref 133–144)
T4 FREE SERPL-MCNC: 1.22 NG/DL (ref 0.76–1.46)
TRIGL SERPL-MCNC: 76 MG/DL
TSH SERPL DL<=0.005 MIU/L-ACNC: 0.18 MU/L (ref 0.4–4)

## 2019-07-18 ENCOUNTER — ANTICOAGULATION THERAPY VISIT (OUTPATIENT)
Dept: NURSING | Facility: CLINIC | Age: 79
End: 2019-07-18
Payer: COMMERCIAL

## 2019-07-18 DIAGNOSIS — I82.409 DVT (DEEP VENOUS THROMBOSIS) (H): ICD-10-CM

## 2019-07-18 LAB — INR POINT OF CARE: 1.8 (ref 0.86–1.14)

## 2019-07-18 PROCEDURE — 99207 ZZC NO CHARGE NURSE ONLY: CPT

## 2019-07-18 PROCEDURE — 85610 PROTHROMBIN TIME: CPT | Mod: QW

## 2019-07-18 PROCEDURE — 36416 COLLJ CAPILLARY BLOOD SPEC: CPT

## 2019-07-18 NOTE — PROGRESS NOTES
ANTICOAGULATION FOLLOW-UP CLINIC VISIT    Patient Name:  Susan Ferguson  Date:  2019  Contact Type:  Face to Face    SUBJECTIVE:  Patient Findings     Comments:   The patient was assessed for diet, medication, and activity level changes, missed or extra doses, bruising or bleeding, with no problem findings.  Patient did have a big salad last night         Clinical Outcomes     Negatives:   Major bleeding event, Thromboembolic event, Anticoagulation-related hospital admission, Anticoagulation-related ED visit, Anticoagulation-related fatality    Comments:   The patient was assessed for diet, medication, and activity level changes, missed or extra doses, bruising or bleeding, with no problem findings.  Patient did have a big salad last night            OBJECTIVE    INR Protime   Date Value Ref Range Status   2019 1.8 (A) 0.86 - 1.14 Final     Factor 2 Assay   Date Value Ref Range Status   2009 19 (L) 60 - 140 % Final       ASSESSMENT / PLAN  No question data found.  Anticoagulation Summary  As of 2019    INR goal:   2.0-3.0   TTR:   62.4 % (1.4 y)   INR used for dosin.8! (2019)   Warfarin maintenance plan:   7.5 mg (5 mg x 1.5) every Mon; 5 mg (5 mg x 1) all other days   Full warfarin instructions:   : 7.5 mg; Otherwise 7.5 mg every Mon; 5 mg all other days   Weekly warfarin total:   37.5 mg   Plan last modified:   Lien Ray RN (2019)   Next INR check:   8/15/2019   Target end date:       Indications    DVT (deep venous thrombosis) (H) [I82.409]             Anticoagulation Episode Summary     INR check location:       Preferred lab:       Send INR reminders to:   YARI WHITE    Comments:         Anticoagulation Care Providers     Provider Role Specialty Phone number    Lety Vela DO [facility] Internal Medicine [phone]            See the Encounter Report to view Anticoagulation Flowsheet and Dosing Calendar (Go to Encounters tab in chart review, and  find the Anticoagulation Therapy Visit)    Patient states that she did have an large salad last evening with dark green lettuce.  Did advise patient to recheck INR in 2 weeks and she is refusing, only wants to come back in 4 weeks.     April Blair RN

## 2019-08-15 ENCOUNTER — ANTICOAGULATION THERAPY VISIT (OUTPATIENT)
Dept: NURSING | Facility: CLINIC | Age: 79
End: 2019-08-15
Payer: COMMERCIAL

## 2019-08-15 DIAGNOSIS — I82.409 DVT (DEEP VENOUS THROMBOSIS) (H): ICD-10-CM

## 2019-08-15 LAB — INR POINT OF CARE: 2.6 (ref 0.86–1.14)

## 2019-08-15 PROCEDURE — 85610 PROTHROMBIN TIME: CPT | Mod: QW

## 2019-08-15 PROCEDURE — 36416 COLLJ CAPILLARY BLOOD SPEC: CPT

## 2019-08-15 PROCEDURE — 99207 ZZC NO CHARGE NURSE ONLY: CPT

## 2019-08-15 NOTE — PROGRESS NOTES
ANTICOAGULATION FOLLOW-UP CLINIC VISIT    Patient Name:  Susan Ferguson  Date:  8/15/2019  Contact Type:  Face to Face    SUBJECTIVE:  Patient Findings     Comments:   The patient was assessed for diet, medication, and activity level changes, missed or extra doses, bruising or bleeding, with no problem findings.          Clinical Outcomes     Negatives:   Major bleeding event, Thromboembolic event, Anticoagulation-related hospital admission, Anticoagulation-related ED visit, Anticoagulation-related fatality    Comments:   The patient was assessed for diet, medication, and activity level changes, missed or extra doses, bruising or bleeding, with no problem findings.             OBJECTIVE    INR Protime   Date Value Ref Range Status   08/15/2019 2.6 (A) 0.86 - 1.14 Final     Factor 2 Assay   Date Value Ref Range Status   2009 19 (L) 60 - 140 % Final       ASSESSMENT / PLAN  No question data found.  Anticoagulation Summary  As of 8/15/2019    INR goal:   2.0-3.0   TTR:   63.1 % (1.5 y)   INR used for dosin.6 (8/15/2019)   Warfarin maintenance plan:   7.5 mg (5 mg x 1.5) every Mon; 5 mg (5 mg x 1) all other days   Full warfarin instructions:   7.5 mg every Mon; 5 mg all other days   Weekly warfarin total:   37.5 mg   No change documented:   April Blair RN   Plan last modified:   Lien Ray RN (2019)   Next INR check:   2019   Target end date:       Indications    DVT (deep venous thrombosis) (H) [I82.409]             Anticoagulation Episode Summary     INR check location:       Preferred lab:       Send INR reminders to:   YARI WHITE    Comments:         Anticoagulation Care Providers     Provider Role Specialty Phone number    Lety VelaDO [facility] Internal Medicine [phone]            See the Encounter Report to view Anticoagulation Flowsheet and Dosing Calendar (Go to Encounters tab in chart review, and find the Anticoagulation Therapy Visit)        April GOMEZ  EMANUEL Blair

## 2019-09-12 ENCOUNTER — ANTICOAGULATION THERAPY VISIT (OUTPATIENT)
Dept: NURSING | Facility: CLINIC | Age: 79
End: 2019-09-12
Payer: COMMERCIAL

## 2019-09-12 DIAGNOSIS — I82.409 DVT (DEEP VENOUS THROMBOSIS) (H): ICD-10-CM

## 2019-09-12 LAB — INR POINT OF CARE: 3.5 (ref 0.86–1.14)

## 2019-09-12 PROCEDURE — 36416 COLLJ CAPILLARY BLOOD SPEC: CPT

## 2019-09-12 PROCEDURE — 85610 PROTHROMBIN TIME: CPT | Mod: QW

## 2019-09-12 PROCEDURE — 99207 ZZC NO CHARGE NURSE ONLY: CPT

## 2019-09-12 NOTE — PROGRESS NOTES
ANTICOAGULATION FOLLOW-UP CLINIC VISIT    Patient Name:  Susan Ferguson  Date:  9/12/2019  Contact Type:  Face to Face    SUBJECTIVE:  Patient Findings         Clinical Outcomes     Negatives:   Major bleeding event, Thromboembolic event, Anticoagulation-related hospital admission, Anticoagulation-related ED visit, Anticoagulation-related fatality           OBJECTIVE    INR Protime   Date Value Ref Range Status   09/12/2019 3.5 (A) 0.86 - 1.14 Final     Factor 2 Assay   Date Value Ref Range Status   03/03/2009 19 (L) 60 - 140 % Final       ASSESSMENT / PLAN  No question data found.  Anticoagulation Summary  As of 9/12/2019    INR goal:   2.0-3.0   TTR:   62.2 % (1.6 y)   INR used for dosing:   3.5! (9/12/2019)   Warfarin maintenance plan:   7.5 mg (5 mg x 1.5) every Mon; 5 mg (5 mg x 1) all other days   Full warfarin instructions:   9/12: Hold; Otherwise 7.5 mg every Mon; 5 mg all other days   Weekly warfarin total:   37.5 mg   Plan last modified:   Lien Ray RN (2/14/2019)   Next INR check:   9/26/2019   Target end date:       Indications    DVT (deep venous thrombosis) (H) [I82.409]             Anticoagulation Episode Summary     INR check location:       Preferred lab:       Send INR reminders to:   YARI WHITE    Comments:         Anticoagulation Care Providers     Provider Role Specialty Phone number    Lety VelaDO [facility] Internal Medicine [phone]            See the Encounter Report to view Anticoagulation Flowsheet and Dosing Calendar (Go to Encounters tab in chart review, and find the Anticoagulation Therapy Visit)    Patient states that she has had joint pain due to the weather. Has been taking tylenol for the pain.  Her cat has also passed away this past week and she has been stressed over that.  Will decrease dose for today and then resume dosing and recheck in 2 weeks.     April Blair RN

## 2019-09-26 ENCOUNTER — ANTICOAGULATION THERAPY VISIT (OUTPATIENT)
Dept: NURSING | Facility: CLINIC | Age: 79
End: 2019-09-26
Payer: COMMERCIAL

## 2019-09-26 DIAGNOSIS — I82.409 DVT (DEEP VENOUS THROMBOSIS) (H): ICD-10-CM

## 2019-09-26 LAB — INR POINT OF CARE: 3.6 (ref 0.86–1.14)

## 2019-09-26 PROCEDURE — 85610 PROTHROMBIN TIME: CPT | Mod: QW

## 2019-09-26 PROCEDURE — 99207 ZZC NO CHARGE NURSE ONLY: CPT

## 2019-09-26 PROCEDURE — 36416 COLLJ CAPILLARY BLOOD SPEC: CPT

## 2019-09-26 NOTE — PROGRESS NOTES
"ANTICOAGULATION FOLLOW-UP CLINIC VISIT    Patient Name:  Susan Ferguson  Date:  9/26/2019  Contact Type:  Face to Face    SUBJECTIVE:  Patient Findings     Positives:   Change in health (patient has had increased \"aches\" )             OBJECTIVE    INR Protime   Date Value Ref Range Status   09/26/2019 3.6 (A) 0.86 - 1.14 Final     Factor 2 Assay   Date Value Ref Range Status   03/03/2009 19 (L) 60 - 140 % Final       ASSESSMENT / PLAN  No question data found.  Anticoagulation Summary  As of 9/26/2019    INR goal:   2.0-3.0   TTR:   60.7 % (1.6 y)   INR used for dosing:   3.6! (9/26/2019)   Warfarin maintenance plan:   5 mg (5 mg x 1) every day   Full warfarin instructions:   9/26: 2.5 mg; Otherwise 5 mg every day   Weekly warfarin total:   35 mg   Plan last modified:   April Blair RN (9/26/2019)   Next INR check:   10/10/2019   Target end date:       Indications    DVT (deep venous thrombosis) (H) [I82.409]             Anticoagulation Episode Summary     INR check location:       Preferred lab:       Send INR reminders to:   YARI WHITE    Comments:         Anticoagulation Care Providers     Provider Role Specialty Phone number    Lety Vela,  [facility] Internal Medicine [phone]            See the Encounter Report to view Anticoagulation Flowsheet and Dosing Calendar (Go to Encounters tab in chart review, and find the Anticoagulation Therapy Visit)    Patient c/o increased aches and pains, takes 2000 MG tylenol a day.  Last INR also elevated, will decrease dose today and then maintenance dose ~ 7%    April Blair RN                 "

## 2019-10-01 ENCOUNTER — HEALTH MAINTENANCE LETTER (OUTPATIENT)
Age: 79
End: 2019-10-01

## 2019-10-10 ENCOUNTER — ANTICOAGULATION THERAPY VISIT (OUTPATIENT)
Dept: NURSING | Facility: CLINIC | Age: 79
End: 2019-10-10
Payer: COMMERCIAL

## 2019-10-10 DIAGNOSIS — I82.409 DVT (DEEP VENOUS THROMBOSIS) (H): ICD-10-CM

## 2019-10-10 LAB — INR POINT OF CARE: 2.8 (ref 0.86–1.14)

## 2019-10-10 PROCEDURE — 99207 ZZC NO CHARGE NURSE ONLY: CPT

## 2019-10-10 PROCEDURE — 85610 PROTHROMBIN TIME: CPT | Mod: QW

## 2019-10-10 PROCEDURE — 36416 COLLJ CAPILLARY BLOOD SPEC: CPT

## 2019-10-10 NOTE — PROGRESS NOTES
ANTICOAGULATION FOLLOW-UP CLINIC VISIT    Patient Name:  Susan Ferguson  Date:  10/10/2019  Contact Type:  Face to Face    SUBJECTIVE:  Patient Findings     Comments:   The patient was assessed for diet, medication, and activity level changes, missed or extra doses, bruising or bleeding, with no problem findings.          Clinical Outcomes     Negatives:   Major bleeding event, Thromboembolic event, Anticoagulation-related hospital admission, Anticoagulation-related ED visit, Anticoagulation-related fatality    Comments:   The patient was assessed for diet, medication, and activity level changes, missed or extra doses, bruising or bleeding, with no problem findings.             OBJECTIVE    INR Protime   Date Value Ref Range Status   10/10/2019 2.8 (A) 0.86 - 1.14 Final     Factor 2 Assay   Date Value Ref Range Status   2009 19 (L) 60 - 140 % Final       ASSESSMENT / PLAN  No question data found.  Anticoagulation Summary  As of 10/10/2019    INR goal:   2.0-3.0   TTR:   59.9 % (1.6 y)   INR used for dosin.8 (10/10/2019)   Warfarin maintenance plan:   5 mg (5 mg x 1) every day   Full warfarin instructions:   5 mg every day   Weekly warfarin total:   35 mg   Plan last modified:   April Blair RN (2019)   Next INR check:   2019   Target end date:       Indications    DVT (deep venous thrombosis) (H) [I82.409]             Anticoagulation Episode Summary     INR check location:       Preferred lab:       Send INR reminders to:   YARI WHITE    Comments:         Anticoagulation Care Providers     Provider Role Specialty Phone number    Lety Vela,  [facility] Internal Medicine [phone]            See the Encounter Report to view Anticoagulation Flowsheet and Dosing Calendar (Go to Encounters tab in chart review, and find the Anticoagulation Therapy Visit)        April Blair RN

## 2019-11-07 ENCOUNTER — ANTICOAGULATION THERAPY VISIT (OUTPATIENT)
Dept: NURSING | Facility: CLINIC | Age: 79
End: 2019-11-07
Payer: COMMERCIAL

## 2019-11-07 DIAGNOSIS — I82.409 DVT (DEEP VENOUS THROMBOSIS) (H): ICD-10-CM

## 2019-11-07 LAB — INR POINT OF CARE: 2.8 (ref 0.86–1.14)

## 2019-11-07 PROCEDURE — 99207 ZZC NO CHARGE NURSE ONLY: CPT

## 2019-11-07 PROCEDURE — 36416 COLLJ CAPILLARY BLOOD SPEC: CPT

## 2019-11-07 PROCEDURE — 85610 PROTHROMBIN TIME: CPT | Mod: QW

## 2019-11-07 NOTE — PROGRESS NOTES
ANTICOAGULATION FOLLOW-UP CLINIC VISIT    Patient Name:  Susan Ferguson  Date:  2019  Contact Type:  Face to Face    SUBJECTIVE:  Patient Findings     Comments:   The patient was assessed for diet, medication, and activity level changes, missed or extra doses, bruising or bleeding, with no problem findings.          Clinical Outcomes     Negatives:   Major bleeding event, Thromboembolic event, Anticoagulation-related hospital admission, Anticoagulation-related ED visit, Anticoagulation-related fatality    Comments:   The patient was assessed for diet, medication, and activity level changes, missed or extra doses, bruising or bleeding, with no problem findings.             OBJECTIVE    INR Protime   Date Value Ref Range Status   2019 2.8 (A) 0.86 - 1.14 Final     Factor 2 Assay   Date Value Ref Range Status   2009 19 (L) 60 - 140 % Final       ASSESSMENT / PLAN  No question data found.  Anticoagulation Summary  As of 2019    INR goal:   2.0-3.0   TTR:   61.6 % (1.7 y)   INR used for dosin.8 (2019)   Warfarin maintenance plan:   5 mg (5 mg x 1) every day   Full warfarin instructions:   5 mg every day   Weekly warfarin total:   35 mg   No change documented:   April Blair RN   Plan last modified:   April Blair RN (2019)   Next INR check:   2019   Target end date:       Indications    DVT (deep venous thrombosis) (H) [I82.409]             Anticoagulation Episode Summary     INR check location:       Preferred lab:       Send INR reminders to:   YARI WHITE    Comments:         Anticoagulation Care Providers     Provider Role Specialty Phone number    Dane Lety Alexa,  [facility] Internal Medicine [phone]            See the Encounter Report to view Anticoagulation Flowsheet and Dosing Calendar (Go to Encounters tab in chart review, and find the Anticoagulation Therapy Visit)        April Blair RN

## 2019-12-05 ENCOUNTER — ANTICOAGULATION THERAPY VISIT (OUTPATIENT)
Dept: NURSING | Facility: CLINIC | Age: 79
End: 2019-12-05
Payer: COMMERCIAL

## 2019-12-05 DIAGNOSIS — I82.409 DVT (DEEP VENOUS THROMBOSIS) (H): ICD-10-CM

## 2019-12-05 LAB — INR POINT OF CARE: 3.6 (ref 0.86–1.14)

## 2019-12-05 PROCEDURE — 36416 COLLJ CAPILLARY BLOOD SPEC: CPT

## 2019-12-05 PROCEDURE — 85610 PROTHROMBIN TIME: CPT | Mod: QW

## 2019-12-05 PROCEDURE — 99207 ZZC NO CHARGE NURSE ONLY: CPT

## 2019-12-19 ENCOUNTER — ANTICOAGULATION THERAPY VISIT (OUTPATIENT)
Dept: NURSING | Facility: CLINIC | Age: 79
End: 2019-12-19
Payer: COMMERCIAL

## 2019-12-19 ENCOUNTER — TELEPHONE (OUTPATIENT)
Dept: FAMILY MEDICINE | Facility: CLINIC | Age: 79
End: 2019-12-19

## 2019-12-19 DIAGNOSIS — I82.409 DVT (DEEP VENOUS THROMBOSIS) (H): Primary | Chronic | ICD-10-CM

## 2019-12-19 DIAGNOSIS — Z86.718 HISTORY OF DEEP VENOUS THROMBOSIS: ICD-10-CM

## 2019-12-19 DIAGNOSIS — I82.409 DVT (DEEP VENOUS THROMBOSIS) (H): ICD-10-CM

## 2019-12-19 DIAGNOSIS — D68.51 FACTOR V LEIDEN (H): Chronic | ICD-10-CM

## 2019-12-19 LAB — INR POINT OF CARE: 2.1 (ref 0.86–1.14)

## 2019-12-19 PROCEDURE — 36416 COLLJ CAPILLARY BLOOD SPEC: CPT

## 2019-12-19 PROCEDURE — 85610 PROTHROMBIN TIME: CPT | Mod: QW

## 2019-12-19 PROCEDURE — 99207 ZZC NO CHARGE NURSE ONLY: CPT

## 2019-12-19 NOTE — PROGRESS NOTES
ANTICOAGULATION FOLLOW-UP CLINIC VISIT    Patient Name:  Susan Ferguson  Date:  2019  Contact Type:  Face to Face    SUBJECTIVE:  Patient Findings     Comments:   The patient was assessed for diet, medication, and activity level changes, missed or extra doses, bruising or bleeding, with no problem findings.          Clinical Outcomes     Negatives:   Major bleeding event, Thromboembolic event, Anticoagulation-related hospital admission, Anticoagulation-related ED visit, Anticoagulation-related fatality    Comments:   The patient was assessed for diet, medication, and activity level changes, missed or extra doses, bruising or bleeding, with no problem findings.             OBJECTIVE    INR Protime   Date Value Ref Range Status   2019 2.1 (A) 0.86 - 1.14 Final     Factor 2 Assay   Date Value Ref Range Status   2009 19 (L) 60 - 140 % Final       ASSESSMENT / PLAN  INR assessment THER    Recheck INR In: 4 WEEKS    INR Location Clinic      Anticoagulation Summary  As of 2019    INR goal:   2.0-3.0   TTR:   60.8 % (1 y)   INR used for dosin.1 (2019)   Warfarin maintenance plan:   5 mg (5 mg x 1) every day   Full warfarin instructions:   5 mg every day   Weekly warfarin total:   35 mg   No change documented:   April Blair RN   Plan last modified:   April Blair RN (2019)   Next INR check:   2020   Priority:   High   Target end date:       Indications    DVT (deep venous thrombosis) (H) [I82.409]             Anticoagulation Episode Summary     INR check location:       Preferred lab:       Send INR reminders to:   YARI WHITE    Comments:         Anticoagulation Care Providers     Provider Role Specialty Phone number    VelaLety DO [facility] Internal Medicine [phone]            See the Encounter Report to view Anticoagulation Flowsheet and Dosing Calendar (Go to Encounters tab in chart review, and find the Anticoagulation Therapy  Visit)    Dosage adjustment made based on physician directed care plan.    April Blair RN

## 2020-01-16 ENCOUNTER — ANTICOAGULATION THERAPY VISIT (OUTPATIENT)
Dept: NURSING | Facility: CLINIC | Age: 80
End: 2020-01-16
Payer: COMMERCIAL

## 2020-01-16 DIAGNOSIS — I82.409 DVT (DEEP VENOUS THROMBOSIS) (H): ICD-10-CM

## 2020-01-16 DIAGNOSIS — D68.51 FACTOR V LEIDEN (H): ICD-10-CM

## 2020-01-16 DIAGNOSIS — Z86.718 HISTORY OF DEEP VENOUS THROMBOSIS: ICD-10-CM

## 2020-01-16 LAB — INR POINT OF CARE: 3.5 (ref 0.86–1.14)

## 2020-01-16 PROCEDURE — 85610 PROTHROMBIN TIME: CPT | Mod: QW

## 2020-01-16 PROCEDURE — 36416 COLLJ CAPILLARY BLOOD SPEC: CPT

## 2020-01-16 PROCEDURE — 99207 ZZC NO CHARGE NURSE ONLY: CPT

## 2020-01-16 NOTE — PROGRESS NOTES
ANTICOAGULATION FOLLOW-UP CLINIC VISIT    Patient Name:  Susan Ferguson  Date:  1/16/2020  Contact Type:  Face to Face    SUBJECTIVE:  Patient Findings     Comments:   Patient denies any identifiable changes that caused the supratherapeutic INR.           Clinical Outcomes     Negatives:   Major bleeding event, Thromboembolic event, Anticoagulation-related hospital admission, Anticoagulation-related ED visit, Anticoagulation-related fatality    Comments:   Patient denies any identifiable changes that caused the supratherapeutic INR.              OBJECTIVE    INR Protime   Date Value Ref Range Status   01/16/2020 3.5 (A) 0.86 - 1.14 Final     Factor 2 Assay   Date Value Ref Range Status   03/03/2009 19 (L) 60 - 140 % Final       ASSESSMENT / PLAN  No question data found.  Anticoagulation Summary  As of 1/16/2020    INR goal:   2.0-3.0   TTR:   58.1 % (1 y)   INR used for dosing:   3.5! (1/16/2020)   Warfarin maintenance plan:   5 mg (5 mg x 1) every day   Full warfarin instructions:   1/16: 2.5 mg; Otherwise 5 mg every day   Weekly warfarin total:   35 mg   Plan last modified:   April Blair RN (9/26/2019)   Next INR check:   1/30/2020   Priority:   High   Target end date:   12/19/2020    Indications    DVT (deep venous thrombosis) (H) [I82.409]  Factor V Leiden (H) [D68.51]  History of deep venous thrombosis [Z86.718]             Anticoagulation Episode Summary     INR check location:       Preferred lab:       Send INR reminders to:   YARI WHITE    Comments:         Anticoagulation Care Providers     Provider Role Specialty Phone number    Jimi Wan MD Referring Internal Medicine [phone]    Lety Vela DO Responsible Internal Medicine [phone]            See the Encounter Report to view Anticoagulation Flowsheet and Dosing Calendar (Go to Encounters tab in chart review, and find the Anticoagulation Therapy Visit)    Dosage adjustment made based on physician directed care  plan.    Patient has had some increased leg pain, has been taking tylenol, no more than 4000 MG daily.  Will decrease dose today and then recheck inr in 2 weeks.  Will change maintenance dose if INR remains elevated.     April Blair RN

## 2020-01-30 ENCOUNTER — ANTICOAGULATION THERAPY VISIT (OUTPATIENT)
Dept: NURSING | Facility: CLINIC | Age: 80
End: 2020-01-30
Payer: COMMERCIAL

## 2020-01-30 DIAGNOSIS — I82.409 DVT (DEEP VENOUS THROMBOSIS) (H): ICD-10-CM

## 2020-01-30 DIAGNOSIS — Z86.718 HISTORY OF DEEP VENOUS THROMBOSIS: ICD-10-CM

## 2020-01-30 DIAGNOSIS — D68.51 FACTOR V LEIDEN (H): ICD-10-CM

## 2020-01-30 LAB — INR POINT OF CARE: 2.2 (ref 0.86–1.14)

## 2020-01-30 PROCEDURE — 36416 COLLJ CAPILLARY BLOOD SPEC: CPT

## 2020-01-30 PROCEDURE — 85610 PROTHROMBIN TIME: CPT | Mod: QW

## 2020-01-30 PROCEDURE — 99207 ZZC NO CHARGE NURSE ONLY: CPT

## 2020-01-30 NOTE — PROGRESS NOTES
ANTICOAGULATION FOLLOW-UP CLINIC VISIT    Patient Name:  Susan Ferguson  Date:  2020  Contact Type:  Face to Face    SUBJECTIVE:  Patient Findings     Comments:   The patient was assessed for diet, medication, and activity level changes, missed or extra doses, bruising or bleeding, with no problem findings.          Clinical Outcomes     Negatives:   Major bleeding event, Thromboembolic event, Anticoagulation-related hospital admission, Anticoagulation-related ED visit, Anticoagulation-related fatality    Comments:   The patient was assessed for diet, medication, and activity level changes, missed or extra doses, bruising or bleeding, with no problem findings.             OBJECTIVE    INR Protime   Date Value Ref Range Status   2020 2.2 (A) 0.86 - 1.14 Final     Factor 2 Assay   Date Value Ref Range Status   2009 19 (L) 60 - 140 % Final       ASSESSMENT / PLAN  No question data found.  Anticoagulation Summary  As of 2020    INR goal:   2.0-3.0   TTR:   56.6 % (1 y)   INR used for dosin.2 (2020)   Warfarin maintenance plan:   5 mg (5 mg x 1) every day   Full warfarin instructions:   5 mg every day   Weekly warfarin total:   35 mg   No change documented:   April Blair RN   Plan last modified:   April Blair RN (2019)   Next INR check:   2020   Priority:   Maintenance   Target end date:   2020    Indications    DVT (deep venous thrombosis) (H) [I82.409]  Factor V Leiden (H) [D68.51]  History of deep venous thrombosis [Z86.718]             Anticoagulation Episode Summary     INR check location:       Preferred lab:       Send INR reminders to:   YARI WHITE    Comments:         Anticoagulation Care Providers     Provider Role Specialty Phone number    Jimi Wan MD Referring Internal Medicine [phone]            See the Encounter Report to view Anticoagulation Flowsheet and Dosing Calendar (Go to Encounters tab in chart review, and find the  Anticoagulation Therapy Visit)    Dosage adjustment made based on physician directed care plan.     Pt denies any changes in diet,health or activity. Susan is aware if signs of clotting (pain, tenderness, swelling, color change in leg or arm, SOB) and bleeding occur (blood in stool, urine, large bruising, bleeding gums, nosebleeds) to have INR check sooner. If sx severe report to ER or concerned for stroke call 911. If general questions or concerns arise, call clinic.         April Blair RN

## 2020-02-27 ENCOUNTER — ANTICOAGULATION THERAPY VISIT (OUTPATIENT)
Dept: NURSING | Facility: CLINIC | Age: 80
End: 2020-02-27
Payer: COMMERCIAL

## 2020-02-27 DIAGNOSIS — Z86.718 HISTORY OF DEEP VENOUS THROMBOSIS: ICD-10-CM

## 2020-02-27 DIAGNOSIS — D68.51 FACTOR V LEIDEN (H): ICD-10-CM

## 2020-02-27 DIAGNOSIS — I82.409 DVT (DEEP VENOUS THROMBOSIS) (H): ICD-10-CM

## 2020-02-27 LAB — INR POINT OF CARE: 3 (ref 0.86–1.14)

## 2020-02-27 PROCEDURE — 85610 PROTHROMBIN TIME: CPT | Mod: QW

## 2020-02-27 PROCEDURE — 36416 COLLJ CAPILLARY BLOOD SPEC: CPT

## 2020-02-27 PROCEDURE — 99207 ZZC NO CHARGE NURSE ONLY: CPT

## 2020-02-27 NOTE — PROGRESS NOTES
ANTICOAGULATION FOLLOW-UP CLINIC VISIT    Patient Name:  Susan Ferguson  Date:  2/27/2020  Contact Type:  Face to Face    SUBJECTIVE:  Patient Findings     Comments:   The patient was assessed for diet, medication, and activity level changes, missed or extra doses, bruising or bleeding, with no problem findings.          Clinical Outcomes     Negatives:   Major bleeding event, Thromboembolic event, Anticoagulation-related hospital admission, Anticoagulation-related ED visit, Anticoagulation-related fatality    Comments:   The patient was assessed for diet, medication, and activity level changes, missed or extra doses, bruising or bleeding, with no problem findings.             OBJECTIVE    INR Protime   Date Value Ref Range Status   02/27/2020 3.0 (A) 0.86 - 1.14 Final     Factor 2 Assay   Date Value Ref Range Status   03/03/2009 19 (L) 60 - 140 % Final       ASSESSMENT / PLAN  No question data found.  Anticoagulation Summary  As of 2/27/2020    INR goal:   2.0-3.0   TTR:   63.6 % (1 y)   INR used for dosing:   3.0 (2/27/2020)   Warfarin maintenance plan:   5 mg (5 mg x 1) every day   Full warfarin instructions:   5 mg every day   Weekly warfarin total:   35 mg   No change documented:   April Blari RN   Plan last modified:   April Blair RN (9/26/2019)   Next INR check:   3/26/2020   Priority:   Maintenance   Target end date:   12/19/2020    Indications    DVT (deep venous thrombosis) (H) [I82.409]  Factor V Leiden (H) [D68.51]  History of deep venous thrombosis [Z86.718]             Anticoagulation Episode Summary     INR check location:       Preferred lab:       Send INR reminders to:   YARI WHITE    Comments:         Anticoagulation Care Providers     Provider Role Specialty Phone number    Jimi Wan MD Referring Internal Medicine [phone]            See the Encounter Report to view Anticoagulation Flowsheet and Dosing Calendar (Go to Encounters tab in chart review, and find the  Anticoagulation Therapy Visit)    Dosage adjustment made based on physician directed care plan.    Patient states that she has not had as many greens as she usually dose, will resume normal eating pattern.     Susan is aware if signs of clotting (pain, tenderness, swelling, color change in leg or arm, SOB) and bleeding occur (blood in stool, urine, large bruising, bleeding gums, nosebleeds) to have INR check sooner. If sx severe report to ER or concerned for stroke call 911. If general questions or concerns arise, call clinic.         April Blair RN

## 2020-03-23 DIAGNOSIS — I82.409 DVT (DEEP VENOUS THROMBOSIS) (H): Primary | Chronic | ICD-10-CM

## 2020-04-13 ENCOUNTER — ANTICOAGULATION THERAPY VISIT (OUTPATIENT)
Dept: NURSING | Facility: CLINIC | Age: 80
End: 2020-04-13

## 2020-04-13 DIAGNOSIS — I82.409 DVT (DEEP VENOUS THROMBOSIS) (H): Chronic | ICD-10-CM

## 2020-04-13 LAB — INR PPP: 3.1 (ref 0.86–1.14)

## 2020-04-13 PROCEDURE — 99207 ZZC NO CHARGE NURSE ONLY: CPT | Performed by: INTERNAL MEDICINE

## 2020-04-13 PROCEDURE — 36416 COLLJ CAPILLARY BLOOD SPEC: CPT | Performed by: INTERNAL MEDICINE

## 2020-04-13 PROCEDURE — 85610 PROTHROMBIN TIME: CPT | Performed by: INTERNAL MEDICINE

## 2020-04-13 NOTE — PROGRESS NOTES
ANTICOAGULATION FOLLOW-UP CLINIC VISIT    Patient Name:  Susan Ferguson  Date:  4/13/2020  Contact Type:  Telephone/ Left a detailed message    SUBJECTIVE:         OBJECTIVE    INR   Date Value Ref Range Status   04/13/2020 3.10 (H) 0.86 - 1.14 Final     Factor 2 Assay   Date Value Ref Range Status   03/03/2009 19 (L) 60 - 140 % Final       ASSESSMENT / PLAN  INR assessment SUPRA    Recheck INR In: 3 WEEKS    INR Location Outside lab      Anticoagulation Summary  As of 4/13/2020    INR goal:   2.0-3.0   TTR:   55.0 % (1 y)   INR used for dosing:      Warfarin maintenance plan:   5 mg (5 mg x 1) every day   Full warfarin instructions:   4/13: 2.5 mg; Otherwise 5 mg every day   Weekly warfarin total:   35 mg   Plan last modified:   April Blair RN (9/26/2019)   Next INR check:   5/4/2020   Priority:   Maintenance   Target end date:   12/19/2020    Indications    DVT (deep venous thrombosis) (H) [I82.409]  Factor V Leiden (H) [D68.51]  History of deep venous thrombosis [Z86.718]             Anticoagulation Episode Summary     INR check location:       Preferred lab:       Send INR reminders to:   YARI WHITE    Comments:         Anticoagulation Care Providers     Provider Role Specialty Phone number    Jimi Wan MD Referring Internal Medicine [phone]            See the Encounter Report to view Anticoagulation Flowsheet and Dosing Calendar (Go to Encounters tab in chart review, and find the Anticoagulation Therapy Visit)    Pt INR is 3.1 today. Left a detailed message for pt to take 2.5 mg today 4/13/20 then continue 5 mg daily. Recheck INR in 3 weeks through lab on 5/4/20 at 9:15 am. Pt given the central INR number to call if questions/concerns.    Mila Keith, EMANUEL

## 2020-05-04 ENCOUNTER — ANTICOAGULATION THERAPY VISIT (OUTPATIENT)
Dept: NURSING | Facility: CLINIC | Age: 80
End: 2020-05-04

## 2020-05-04 DIAGNOSIS — I82.409 DVT (DEEP VENOUS THROMBOSIS) (H): ICD-10-CM

## 2020-05-04 DIAGNOSIS — I82.409 DVT (DEEP VENOUS THROMBOSIS) (H): Chronic | ICD-10-CM

## 2020-05-04 DIAGNOSIS — Z86.718 HISTORY OF DEEP VENOUS THROMBOSIS: ICD-10-CM

## 2020-05-04 DIAGNOSIS — D68.51 FACTOR V LEIDEN (H): ICD-10-CM

## 2020-05-04 LAB — INR PPP: 2.4 (ref 0.86–1.14)

## 2020-05-04 PROCEDURE — 85610 PROTHROMBIN TIME: CPT | Performed by: INTERNAL MEDICINE

## 2020-05-04 PROCEDURE — 36416 COLLJ CAPILLARY BLOOD SPEC: CPT | Performed by: INTERNAL MEDICINE

## 2020-05-04 NOTE — PROGRESS NOTES
Pt denies any changes in diet,health or activity. Susan is aware if signs of clotting (pain, tenderness, swelling, color change in leg or arm, SOB) and bleeding occur (blood in stool, urine, large bruising, bleeding gums, nosebleeds) to have INR check sooner. If sx severe report to ER or concerned for stroke call 911. If general questions or concerns arise, call clinic.    April Blair RN  [facility]                  Anticoagulation Management    Unable to reach Susan today.    Today's INR result of 2.4 is therapeutic (goal INR of 2.0-3.0).  Result received from: Clinic Lab    Follow up required to confirm warfarin dose taken and assess for changes    [facility]      Anticoagulation clinic to follow up    April Blair RN

## 2020-06-15 ENCOUNTER — ANTICOAGULATION THERAPY VISIT (OUTPATIENT)
Dept: ANTICOAGULATION | Facility: CLINIC | Age: 80
End: 2020-06-15
Payer: COMMERCIAL

## 2020-06-15 DIAGNOSIS — I82.409 DVT (DEEP VENOUS THROMBOSIS) (H): ICD-10-CM

## 2020-06-15 DIAGNOSIS — D68.51 FACTOR V LEIDEN (H): ICD-10-CM

## 2020-06-15 DIAGNOSIS — I82.409 DVT (DEEP VENOUS THROMBOSIS) (H): Chronic | ICD-10-CM

## 2020-06-15 DIAGNOSIS — Z86.718 HISTORY OF DEEP VENOUS THROMBOSIS: ICD-10-CM

## 2020-06-15 LAB
CAPILLARY BLOOD COLLECTION: NORMAL
INR PPP: 3.5 (ref 0.86–1.14)

## 2020-06-15 PROCEDURE — 36416 COLLJ CAPILLARY BLOOD SPEC: CPT | Performed by: INTERNAL MEDICINE

## 2020-06-15 PROCEDURE — 99207 ZZC NO CHARGE NURSE ONLY: CPT

## 2020-06-15 PROCEDURE — 85610 PROTHROMBIN TIME: CPT | Performed by: INTERNAL MEDICINE

## 2020-06-15 NOTE — PROGRESS NOTES
ANTICOAGULATION FOLLOW-UP CLINIC VISIT    Patient Name:  Susan Ferguson  Date:  6/15/2020  Contact Type:  Telephone    SUBJECTIVE:  Patient Findings     Positives:   Change in diet/appetite (Diet changed last week as she celebrated her birthday)    Comments:   The patient was assessed for diet, medication, and activity level changes, missed or extra doses, bruising or bleeding, with no problem findings.          Clinical Outcomes     Comments:   The patient was assessed for diet, medication, and activity level changes, missed or extra doses, bruising or bleeding, with no problem findings.             OBJECTIVE    Recent labs: (last 7 days)     06/15/20  0914   INR 3.50*       ASSESSMENT / PLAN  INR assessment SUPRA    Recheck INR In: 2 WEEKS    INR Location Clinic      Anticoagulation Summary  As of 6/15/2020    INR goal:   2.0-3.0   TTR:   54.3 % (1 y)   INR used for dosing:   3.50! (6/15/2020)   Warfarin maintenance plan:   5 mg (5 mg x 1) every day   Full warfarin instructions:   6/15: Hold; Otherwise 5 mg every day   Weekly warfarin total:   35 mg   Plan last modified:   April Blair RN (9/26/2019)   Next INR check:   7/3/2020   Priority:   High   Target end date:   12/19/2020    Indications    DVT (deep venous thrombosis) (H) [I82.409]  Factor V Leiden (H) [D68.51]  History of deep venous thrombosis [Z86.718]             Anticoagulation Episode Summary     INR check location:       Preferred lab:       Send INR reminders to:   YARI WHITE    Comments:         Anticoagulation Care Providers     Provider Role Specialty Phone number    Jimi Wan MD Referring Internal Medicine [phone]            See the Encounter Report to view Anticoagulation Flowsheet and Dosing Calendar (Go to Encounters tab in chart review, and find the Anticoagulation Therapy Visit)        Eden Montez RN

## 2020-07-03 ENCOUNTER — ANTICOAGULATION THERAPY VISIT (OUTPATIENT)
Dept: ANTICOAGULATION | Facility: CLINIC | Age: 80
End: 2020-07-03
Payer: COMMERCIAL

## 2020-07-03 DIAGNOSIS — D68.51 FACTOR V LEIDEN (H): ICD-10-CM

## 2020-07-03 DIAGNOSIS — Z86.718 HISTORY OF DEEP VENOUS THROMBOSIS: ICD-10-CM

## 2020-07-03 DIAGNOSIS — I82.409 DVT (DEEP VENOUS THROMBOSIS) (H): ICD-10-CM

## 2020-07-03 DIAGNOSIS — I82.409 DVT (DEEP VENOUS THROMBOSIS) (H): Chronic | ICD-10-CM

## 2020-07-03 LAB
CAPILLARY BLOOD COLLECTION: NORMAL
INR PPP: 3.2 (ref 0.86–1.14)

## 2020-07-03 PROCEDURE — 99207 ZZC NO CHARGE NURSE ONLY: CPT

## 2020-07-03 PROCEDURE — 36416 COLLJ CAPILLARY BLOOD SPEC: CPT | Performed by: INTERNAL MEDICINE

## 2020-07-03 PROCEDURE — 85610 PROTHROMBIN TIME: CPT | Performed by: INTERNAL MEDICINE

## 2020-07-03 NOTE — PROGRESS NOTES
ANTICOAGULATION FOLLOW-UP CLINIC VISIT    Patient Name:  Susan Ferguson  Date:  7/3/2020  Contact Type:  Telephone    SUBJECTIVE:  Patient Findings     Positives:   Change in diet/appetite (She has not been eating any greens.  This coming 3 weeks she is going to add greens to her diet.)    Comments:   The patient was assessed for diet, medication, and activity level changes, missed or extra doses, bruising or bleeding, with no problem findings.          Clinical Outcomes     Comments:   The patient was assessed for diet, medication, and activity level changes, missed or extra doses, bruising or bleeding, with no problem findings.             OBJECTIVE    Recent labs: (last 7 days)     07/03/20  0858   INR 3.20*       ASSESSMENT / PLAN  INR assessment SUPRA    Recheck INR In: 3 WEEKS    INR Location Clinic      Anticoagulation Summary  As of 7/3/2020    INR goal:   2.0-3.0   TTR:   49.4 % (1 y)   INR used for dosing:   3.20! (7/3/2020)   Warfarin maintenance plan:   5 mg (5 mg x 1) every day   Full warfarin instructions:   7/3: 2.5 mg; Otherwise 5 mg every day   Weekly warfarin total:   35 mg   Plan last modified:   April Blair RN (9/26/2019)   Next INR check:   7/27/2020   Priority:   High   Target end date:   12/19/2020    Indications    DVT (deep venous thrombosis) (H) [I82.409]  Factor V Leiden (H) [D68.51]  History of deep venous thrombosis [Z86.718]             Anticoagulation Episode Summary     INR check location:       Preferred lab:       Send INR reminders to:   YARI WHITE    Comments:         Anticoagulation Care Providers     Provider Role Specialty Phone number    Jimi Wan MD Referring Internal Medicine [phone]            See the Encounter Report to view Anticoagulation Flowsheet and Dosing Calendar (Go to Encounters tab in chart review, and find the Anticoagulation Therapy Visit)        Eden Montez RN

## 2020-07-17 ENCOUNTER — NURSE TRIAGE (OUTPATIENT)
Dept: NURSING | Facility: CLINIC | Age: 80
End: 2020-07-17

## 2020-07-17 ENCOUNTER — VIRTUAL VISIT (OUTPATIENT)
Dept: FAMILY MEDICINE | Facility: CLINIC | Age: 80
End: 2020-07-17
Payer: COMMERCIAL

## 2020-07-17 DIAGNOSIS — R60.0 PERIPHERAL EDEMA: Primary | ICD-10-CM

## 2020-07-17 PROCEDURE — 99213 OFFICE O/P EST LOW 20 MIN: CPT | Mod: 95 | Performed by: NURSE PRACTITIONER

## 2020-07-17 RX ORDER — FUROSEMIDE 20 MG
20 TABLET ORAL
COMMUNITY
End: 2020-07-29

## 2020-07-17 RX ORDER — FUROSEMIDE 20 MG
20 TABLET ORAL DAILY
Qty: 20 TABLET | Refills: 0 | Status: SHIPPED | OUTPATIENT
Start: 2020-07-17 | End: 2022-01-01

## 2020-07-17 NOTE — PROGRESS NOTES
"Susan Ferguson is a 80 year old female who is being evaluated via a billable telephone visit.      The patient has been notified of following:     \"This telephone visit will be conducted via a call between you and your physician/provider. We have found that certain health care needs can be provided without the need for a physical exam.  This service lets us provide the care you need with a short phone conversation.  If a prescription is necessary we can send it directly to your pharmacy.  If lab work is needed we can place an order for that and you can then stop by our lab to have the test done at a later time.    Telephone visits are billed at different rates depending on your insurance coverage. During this emergency period, for some insurers they may be billed the same as an in-person visit.  Please reach out to your insurance provider with any questions.    If during the course of the call the physician/provider feels a telephone visit is not appropriate, you will not be charged for this service.\"    Patient has given verbal consent for Telephone visit?  Yes    What phone number would you like to be contacted at? [phone]    How would you like to obtain your AVS? 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  She has no calf pain or redness and no induration.  No pain in her feet .  No new SOB or chest discomfort.  Has not been as careful about salt lately  Has previously been rxd lasix 20mg to take prn but has run out    She has an appt for her annual preventive exam next week    Patient Active Problem List   Diagnosis     Asymptomatic varicose veins     Edema     DVT (deep venous thrombosis) (H)     " Factor V Leiden (H)     Osteopenia     Long-term (current) use of anticoagulants [Z79.01]     Essential hypertension     Low TSH level     Thyroid nodule     History of deep venous thrombosis     Morbid obesity (H)     Past Surgical History:   Procedure Laterality Date     COLONOSCOPY  05/11/2009    [facility] ABLATION ATRIAL FLUTTER  3-11     JOINT REPLACEMENT, HIP RT/LT  4-07    Joint Replacement Hip RT     OPEN REDUCTION INTERNAL FIXATION FEMUR DISTAL  9-14    RT- comminuted fx distal femur     SURGICAL HISTORY OF -   12-7-11    ablation for AF       Social History     Tobacco Use     Smoking status: Never Smoker     Smokeless tobacco: Never Used   Substance Use Topics     Alcohol use: No     Alcohol/week: 0.0 standard drinks     Family History   Problem Relation Age of Onset     Neurologic Disorder Mother         parkinson's disease     Diabetes Mother      Arthritis Mother      Cancer Mother         stomach     Blood Disease Father      Neurologic Disorder Other         gullain-barre syndrome         Current Outpatient Medications   Medication Sig Dispense Refill     acetaminophen (TYLENOL) 325 MG tablet Take 2 tablets (650 mg) by mouth every 4 hours as needed for mild pain       ascorbic acid (VITAMIN C) 1000 MG TABS Take 1,000 mg by mouth daily       Cholecalciferol (VITAMIN D) 2000 UNITS tablet Take 2,000 Units by mouth daily       furosemide (LASIX) 20 MG tablet Take 1 tablet (20 mg) by mouth daily 20 tablet 0     JANTOVEN 5 MG tablet Take 1-1.5 tablets (5-7.5 mg) by mouth daily As directed by INR clinic - BRAND only (Patient taking differently: Take 5-7.5 mg by mouth daily Taking 5 mg every day or  As directed by INR clinic - BRAND only) 135 tablet 3     MOISTURIZING LOTION EX prn       Multiple Vitamins-Minerals (MULTIVITAMIN GUMMIES ADULT PO) Take by mouth 2 times daily       Allergies   Allergen Reactions     Codeine      Keflex [Cephalexin Monohydrate] Rash     Latex Dermatitis and Itching     Percocet  [Oxycodone-Acetaminophen] Nausea and Vomiting     Sulfa Drugs        Reviewed and updated as needed this visit by Provider         Review of Systems   Constitutional, HEENT, cardiovascular, pulmonary, GI, , musculoskeletal, neuro, skin, endocrine and psych systems are negative, except as otherwise noted.       Objective   Reported vitals:  There were no vitals taken for this visit.   PSYCH: Alert and oriented times 3; coherent speech, normal   rate and volume, able to articulate logical thoughts, able   to abstract reason, no tangential thoughts, no hallucinations   or delusions  Her affect is normal and pleasant  RESP: No cough, no audible wheezing, able to talk in full sentences  Remainder of exam unable to be completed due to telephone visits    Diagnostic Test Results:  Labs reviewed in Epic        Assessment/Plan:    1. Peripheral edema  She will keep feet elevated, wear compression socks, and take one lasix daily for the next 3-5 days   - furosemide (LASIX) 20 MG tablet; Take 1 tablet (20 mg) by mouth daily  Dispense: 20 tablet; Refill: 0    Phone call duration:  14 minutes    SANTO Thomas CNP

## 2020-07-17 NOTE — TELEPHONE ENCOUNTER
"\"Sometimes when my feet swell I need a diuretic.\" Patient reporting bilateral swelling of lower legs from feet to knees x 2 days. Denies pitting edema. Denies color change to legs. Patient reporting she had a prescription from 2016 for lasix that she took 1 tablet (20 mg) of  2 days ago and 40 mg yesterday with no improvement.   Patient has attempted to elevate feet.   Stating she may have eaten more sodium recently. Denies shortness of breath or chest pain.    Per guidelines advised to see provider with in 24 hours.     Warm transferred to Central Scheduling.     Caller verbalized understanding. Denies further questions.    More Garibay RN  [facility]      COVID 19 Nurse Triage Plan/Patient Instructions    Please be aware that novel coronavirus (COVID-19) may be circulating in the community. If you develop symptoms such as fever, cough, or SOB or if you have concerns about the presence of another infection including coronavirus (COVID-19), please contact your health care provider or visit [email or web address].     Disposition/Instructions    Virtual Visit with provider recommended. Reference Visit Selection Guide.    Thank you for taking steps to prevent the spread of this virus.  o Limit your contact with others.  o Wear a simple mask to cover your cough.  o Wash your hands well and often.    Resources    [facility]: About COVID-19: [email or web address]    CDC: What to Do If You're Sick: www.cdc.gov/coronavirus/2019-ncov/about/steps-when-sick.html    CDC: Ending Home Isolation: www.cdc.gov/coronavirus/2019-ncov/hcp/disposition-in-home-patients.html     CDC: Caring for Someone: www.cdc.gov/coronavirus/2019-ncov/if-you-are-sick/care-for-someone.html     [facility]: Interim Guidance for Hospital Discharge to Home: www.health.[facility].mn.us/diseases/coronavirus/hcp/hospdischarge.pdf    [facility] clinical trials (COVID-19 research studies): clinicalaffairs.[facility]/umn-clinical-trials "     Below are the COVID-19 hotlines at the [place] Department of [facility] ([facility]). Interpreters are available.   o For health questions: Call 318-136-7536 or 1-154.430.3332 (7 a.m. to 7 p.m.)  o For questions about schools and childcare: Call 224-441-5822 or 1-769.186.3704 (7 a.m. to 7 p.m.)                     Additional Information    Negative: Severe difficulty breathing (e.g., struggling for each breath, speaks in single words)    Negative: Looks like a broken bone or dislocated joint (e.g., crooked or deformed)    Negative: Sounds like a life-threatening emergency to the triager    Negative: Difficulty breathing at rest    Negative: Entire foot is cool or blue in comparison to other side    Negative: [1] Can't walk or can barely walk AND [2] new onset    Negative: [1] Difficulty breathing with exertion (e.g., walking) AND [2] new onset or worsening    Negative: [1] Red area or streak AND [2] fever    Negative: [1] Swelling is painful to touch AND [2] fever    Negative: [1] Cast on leg or ankle AND [2] now increased pain    Negative: Patient sounds very sick or weak to the triager    Negative: SEVERE leg swelling (e.g., swelling extends above knee, entire leg is swollen, weeping fluid)    Negative: [1] Red area or streak [2] large (> 2 in. or 5 cm)    Negative: [1] Thigh or calf pain AND [2] only 1 side AND [3] present > 1 hour    Negative: [1] Thigh, calf, or ankle swelling AND [2] only 1 side    Negative: [1] Thigh, calf, or ankle swelling AND [2] bilateral AND [3] 1 side is more swollen    [1] MODERATE leg swelling (e.g., swelling extends up to knees) AND [2] new onset or worsening    Protocols used: LEG SWELLING AND EDEMA-A-AH

## 2020-07-27 ENCOUNTER — ANTICOAGULATION THERAPY VISIT (OUTPATIENT)
Dept: NURSING | Facility: CLINIC | Age: 80
End: 2020-07-27

## 2020-07-27 DIAGNOSIS — D68.51 FACTOR V LEIDEN (H): ICD-10-CM

## 2020-07-27 DIAGNOSIS — I82.409 DVT (DEEP VENOUS THROMBOSIS) (H): ICD-10-CM

## 2020-07-27 DIAGNOSIS — I82.409 DVT (DEEP VENOUS THROMBOSIS) (H): Chronic | ICD-10-CM

## 2020-07-27 DIAGNOSIS — Z86.718 HISTORY OF DEEP VENOUS THROMBOSIS: ICD-10-CM

## 2020-07-27 LAB
CAPILLARY BLOOD COLLECTION: NORMAL
INR PPP: 2.5 (ref 0.86–1.14)

## 2020-07-27 PROCEDURE — 36416 COLLJ CAPILLARY BLOOD SPEC: CPT | Performed by: INTERNAL MEDICINE

## 2020-07-27 PROCEDURE — 85610 PROTHROMBIN TIME: CPT | Performed by: INTERNAL MEDICINE

## 2020-07-27 NOTE — PROGRESS NOTES
ANTICOAGULATION MANAGEMENT     Patient Name:  Susan Ferguson  Date:  7/27/2020    ASSESSMENT /SUBJECTIVE:    Today's INR result of 2.5 is therapeutic. Goal INR of 2.0-3.0      Warfarin dose taken: Warfarin taken as previously instructed    Diet: No new diet changes affecting INR    Medication changes/ interactions: No new medications/supplements affecting INR    Previous INR: Therapeutic     S/S of bleeding or thromboembolism: No    New injury or illness: No    Upcoming surgery, procedure or cardioversion: No    Additional findings: None      PLAN:    Spoke with Susan regarding INR result and instructed:     Warfarin Dosing Instructions: Continue your current warfarin dose 5 mg    Instructed patient to follow up no later than: 5 weeks  Lab visit scheduled    Education provided: Monitoring for bleeding signs and symptoms and Monitoring for clotting signs and symptoms      Susan verbalizes understanding and agrees to warfarin dosing plan.    Instructed to call the Anticoagulation Clinic for any changes, questions or concerns. (#[phone])        April Blair RN      OBJECTIVE:  Recent labs: (last 7 days)     07/27/20  0901   INR 2.50*         No question data found.  Anticoagulation Summary  As of 7/27/2020    INR goal:   2.0-3.0   TTR:   52.0 % (1 y)   INR used for dosing:   No new INR was available at the time of this encounter.   Warfarin maintenance plan:   5 mg (5 mg x 1) every day   Full warfarin instructions:   5 mg every day   Weekly warfarin total:   35 mg   No change documented:   April Blair RN   Plan last modified:   April Blair RN (9/26/2019)   Next INR check:   8/31/2020   Priority:   Maintenance   Target end date:   12/19/2020    Indications    DVT (deep venous thrombosis) (H) [I82.409]  Factor V Leiden (H) [D68.51]  History of deep venous thrombosis [Z86.718]             Anticoagulation Episode Summary     INR check location:       Preferred lab:       Send INR reminders to:    YARI WHITE    Comments:         Anticoagulation Care Providers     Provider Role Specialty Phone number    Jimi Wan MD Referring Internal Medicine [phone]

## 2020-07-29 ENCOUNTER — OFFICE VISIT (OUTPATIENT)
Dept: FAMILY MEDICINE | Facility: CLINIC | Age: 80
End: 2020-07-29
Payer: COMMERCIAL

## 2020-07-29 DIAGNOSIS — Z13.29 SCREENING FOR HYPOTHYROIDISM: ICD-10-CM

## 2020-07-29 DIAGNOSIS — R79.89 LOW TSH LEVEL: ICD-10-CM

## 2020-07-29 DIAGNOSIS — Z13.6 CARDIOVASCULAR SCREENING; LDL GOAL LESS THAN 100: ICD-10-CM

## 2020-07-29 DIAGNOSIS — I10 ESSENTIAL HYPERTENSION: ICD-10-CM

## 2020-07-29 DIAGNOSIS — Z86.718 HISTORY OF DEEP VENOUS THROMBOSIS: Primary | ICD-10-CM

## 2020-07-29 DIAGNOSIS — D68.51 FACTOR V LEIDEN (H): ICD-10-CM

## 2020-07-29 DIAGNOSIS — E55.9 VITAMIN D DEFICIENCY: ICD-10-CM

## 2020-07-29 DIAGNOSIS — E66.01 MORBID OBESITY (H): ICD-10-CM

## 2020-07-29 LAB
ERYTHROCYTE [DISTWIDTH] IN BLOOD BY AUTOMATED COUNT: 14 % (ref 10–15)
HCT VFR BLD AUTO: 41.5 % (ref 35–47)
HGB BLD-MCNC: 13.9 G/DL (ref 11.7–15.7)
MCH RBC QN AUTO: 30.2 PG (ref 26.5–33)
MCHC RBC AUTO-ENTMCNC: 33.5 G/DL (ref 31.5–36.5)
MCV RBC AUTO: 90 FL (ref 78–100)
PLATELET # BLD AUTO: 336 10E9/L (ref 150–450)
RBC # BLD AUTO: 4.6 10E12/L (ref 3.8–5.2)
WBC # BLD AUTO: 6.1 10E9/L (ref 4–11)

## 2020-07-29 PROCEDURE — 80061 LIPID PANEL: CPT | Performed by: INTERNAL MEDICINE

## 2020-07-29 PROCEDURE — 82306 VITAMIN D 25 HYDROXY: CPT | Performed by: INTERNAL MEDICINE

## 2020-07-29 PROCEDURE — 99397 PER PM REEVAL EST PAT 65+ YR: CPT | Performed by: INTERNAL MEDICINE

## 2020-07-29 PROCEDURE — 99213 OFFICE O/P EST LOW 20 MIN: CPT | Mod: 25 | Performed by: INTERNAL MEDICINE

## 2020-07-29 PROCEDURE — 84443 ASSAY THYROID STIM HORMONE: CPT | Performed by: INTERNAL MEDICINE

## 2020-07-29 PROCEDURE — 36415 COLL VENOUS BLD VENIPUNCTURE: CPT | Performed by: INTERNAL MEDICINE

## 2020-07-29 PROCEDURE — 85027 COMPLETE CBC AUTOMATED: CPT | Performed by: INTERNAL MEDICINE

## 2020-07-29 PROCEDURE — 80053 COMPREHEN METABOLIC PANEL: CPT | Performed by: INTERNAL MEDICINE

## 2020-07-29 PROCEDURE — 84439 ASSAY OF FREE THYROXINE: CPT | Performed by: INTERNAL MEDICINE

## 2020-07-29 ASSESSMENT — MIFFLIN-ST. JEOR: SCORE: 1412.02

## 2020-07-29 ASSESSMENT — ACTIVITIES OF DAILY LIVING (ADL): CURRENT_FUNCTION: NO ASSISTANCE NEEDED

## 2020-07-29 NOTE — PROGRESS NOTES
"SUBJECTIVE:   Susan Ferguson is a 80 year old female who presents for Preventive Visit.  Chronic venous stasis  Patient notes that her leg swelling is exacerbated by her excessive salt 4 out of 7 days/week.  Because of her poor response to Lasix she has added viktoria snaps because she believes that the viktoria would be helpful with diuresis as well as elevation.        Are you in the first 12 months of your Medicare coverage?  No    Healthy Habits:    In general, how would you rate your overall health?  Good    Frequency of exercise:  None    Do you usually eat at least 4 servings of fruit and vegetables a day, include whole grains    & fiber and avoid regularly eating high fat or \"junk\" foods?  Yes    Taking medications regularly:  Yes    Barriers to taking medications:  None    Medication side effects:  None    Ability to successfully perform activities of daily living:  No assistance needed    Home Safety:  No safety concerns identified    Hearing Impairment:  No hearing concerns    In the past 6 months, have you been bothered by leaking of urine? Yes    In general, how would you rate your overall mental or emotional health?  Good      PHQ-2 Total Score:    Additional concerns today:  Yes    Do you feel safe in your environment? Yes    Have you ever done Advance Care Planning? (For example, a Health Directive, POLST, or a discussion with a medical provider or your loved ones about your wishes): Yes, advance care planning is on file.      Fall risk  Fallen 2 or more times in the past year?: No  Any fall with injury in the past year?: No     Cognitive Screening   1) Repeat 3 items (Leader, Season, Table)    2) Clock draw: NORMAL  3) 3 item recall: Recalls 2 objects   Results: NORMAL clock, 1-2 items recalled: COGNITIVE IMPAIRMENT LESS LIKELY    Mini-CogTM Copyright ELMO Mishra. Licensed by the author for use in [facility]; reprinted with permission (stalin@.[facility]). All rights reserved.      Do you have " sleep apnea, excessive snoring or daytime drowsiness?: no    Reviewed and updated as needed this visit by clinical staff  Tobacco  Allergies  Meds         Reviewed and updated as needed this visit by Provider        Social History     Tobacco Use     Smoking status: Never Smoker     Smokeless tobacco: Never Used   Substance Use Topics     Alcohol use: No     Alcohol/week: 0.0 standard drinks     If you drink alcohol do you typically have >3 drinks per day or >7 drinks per week? Not applicable    Alcohol Use 6/20/2019   Prescreen: >3 drinks/day or >7 drinks/week? No                Current providers sharing in care for this patient include:   Patient Care Team:  Jimi Wan MD as PCP - General (Internal Medicine)  Jimi Wan MD as Assigned PCP    The following health maintenance items are reviewed in Epic and correct as of today:  Health Maintenance   Topic Date Due     ZOSTER IMMUNIZATION (1 of 2) 06/10/1990     ADVANCE CARE PLANNING  02/22/2017     DEXA  11/14/2019     MEDICARE ANNUAL WELLNESS VISIT  06/20/2020     TSH W/FREE T4 REFLEX  06/20/2020     FALL RISK ASSESSMENT  06/20/2020     INFLUENZA VACCINE (1) 09/01/2020     LIPID  06/20/2024     DTAP/TDAP/TD IMMUNIZATION (3 - Td) 03/18/2026     PHQ-2  Completed     PNEUMOCOCCAL IMMUNIZATION 65+ LOW/MEDIUM RISK  Completed     IPV IMMUNIZATION  Aged Out     MENINGITIS IMMUNIZATION  Aged Out     HEPATITIS B IMMUNIZATION  Aged Out     Current Outpatient Medications   Medication Sig Dispense Refill     acetaminophen (TYLENOL) 325 MG tablet Take 2 tablets (650 mg) by mouth every 4 hours as needed for mild pain       ascorbic acid (VITAMIN C) 1000 MG TABS Take 1,000 mg by mouth daily       Cholecalciferol (VITAMIN D) 2000 UNITS tablet Take 2,000 Units by mouth daily       furosemide (LASIX) 20 MG tablet Take 1 tablet (20 mg) by mouth daily 20 tablet 0     JANTOVEN 5 MG tablet Take 1-1.5 tablets (5-7.5 mg) by mouth daily As directed by INR clinic - BRAND only  "(Patient taking differently: Take 5-7.5 mg by mouth daily Taking 5 mg every day or  As directed by INR clinic - BRAND only) 135 tablet 3     MOISTURIZING LOTION EX prn       Multiple Vitamins-Minerals (MULTIVITAMIN GUMMIES ADULT PO) Take by mouth 2 times daily     Supplements:  Multivitamin Gummies  Allergies   Allergen Reactions     Codeine      Keflex [Cephalexin Monohydrate] Rash     Latex Dermatitis and Itching     Percocet [Oxycodone-Acetaminophen] Nausea and Vomiting     Sulfa Drugs          Review of Systems  CONSTITUTIONAL: NEGATIVE for fever, chills, change in weight  INTEGUMENTARY/SKIN: NEGATIVE for worrisome rashes, moles or lesions  EYES: NEGATIVE for vision changes or irritation  ENT/MOUTH: NEGATIVE for ear, mouth and throat problems  RESP: NEGATIVE for significant cough or SOB  BREAST: NEGATIVE for masses, tenderness or discharge  CV: NEGATIVE for chest pain, palpitations or peripheral edema  GI: NEGATIVE for nausea, abdominal pain, heartburn, or change in bowel habits  : NEGATIVE for frequency, dysuria, or hematuria  Nocturia x2, stress incontinence  MUSCULOSKELETAL: NEGATIVE for significant arthralgias or myalgia  NEURO: NEGATIVE for weakness, dizziness or paresthesias  Right-sided sciatica with excess sitting  ENDOCRINE: NEGATIVE for temperature intolerance, skin/hair changes  HEME: NEGATIVE for bleeding problems  PSYCHIATRIC: NEGATIVE for changes in mood or affect  Intuitive medical expertise gain from her masters of education and the Internet  OBJECTIVE:   Pulse 81   Temp 97.3  F (36.3  C) (Temporal)   Ht 1.64 m (5' 4.57\")   Wt 94.8 kg (209 lb)   SpO2 94%   BMI 35.25 kg/m   Estimated body mass index is 35.25 kg/m  as calculated from the following:    Height as of this encounter: 1.64 m (5' 4.57\").    Weight as of this encounter: 94.8 kg (209 lb).   /74 pulse 81 and irregular  Physical Exam  GENERAL APPEARANCE: Morbidly obese female, alert and no distress  EYES: Eyes grossly normal to " "inspection, PERRL and conjunctivae and sclerae normal  HENT: ear canals and TM's normal, nose and mouth without ulcers or lesions, oropharynx clear and oral mucous membranes moist  NECK: no adenopathy, no asymmetry, masses, or scars and thyroid normal to palpation  RESP: lungs clear to auscultation - no rales, rhonchi or wheezes  BREAST: normal without masses, tenderness or nipple discharge and no palpable axillary masses or adenopathy  CV: Irregularly regular rate and rhythm, normal S1 S2, no S3 or S4, no murmur, click or rub,  peripheral edema and peripheral pulses strong  ABDOMEN: soft, nontender, no hepatosplenomegaly, no masses and bowel sounds normal  MS: no musculoskeletal defects are noted and gait is age appropriate without ataxia, no calf tenderness or cords  SKIN: no suspicious lesions or rashes  NEURO: Normal strength and tone, sensory exam grossly normal, mentation intact and speech normal  Back is nontender flexion was normal, straight leg raise was negative  PSYCH: mentation appears normal and affect normal/bright        ASSESSMENT / PLAN:   1. History of deep venous thrombosis  Continue Coumadin anticoagulation  - T4 free    2. Morbid obesity (H)  Work in progress    3. Factor V Leiden (H)      4. Essential hypertension  Well-controlled with current therapy  - CBC with platelets  - Comprehensive metabolic panel    5. Low TSH level  Follow-up thyroid function testing    6. Vitamin D deficiency    - Vitamin D Deficiency    7. CARDIOVASCULAR SCREENING; LDL GOAL LESS THAN 100    - Lipid panel reflex to direct LDL Fasting    8. Screening for hypothyroidism    - TSH with free T4 reflex    COUNSELING:      Estimated body mass index is 35.25 kg/m  as calculated from the following:    Height as of this encounter: 1.64 m (5' 4.57\").    Weight as of this encounter: 94.8 kg (209 lb).         reports that she has never smoked. She has never used smokeless tobacco.      Appropriate preventive services were " discussed with this patient, including applicable screening as appropriate for cardiovascular disease, diabetes, osteopenia/osteoporosis, and glaucoma.  As appropriate for age/gender, discussed screening for colorectal cancer, prostate cancer, breast cancer, and cervical cancer. Checklist reviewing preventive services available has been given to the patient.    Reviewed patients plan of care and provided an AVS. The  clau Davis meets the Care Plan requirement. This Care Plan has been established and reviewed with the Patient.    Counseling Resources:  ATP IV Guidelines  Pooled Cohorts Equation Calculator  Breast Cancer Risk Calculator  FRAX Risk Assessment  ICSI Preventive Guidelines  Dietary Guidelines for Americans, 2010  USDA's MyPlate  ASA Prophylaxis  Lung CA Screening  Return to clinic in 1 month  Jimi Wan MD  [facility]    Identified Health Risks:

## 2020-07-30 LAB
ALBUMIN SERPL-MCNC: 3.1 G/DL (ref 3.4–5)
ALP SERPL-CCNC: 60 U/L (ref 40–150)
ALT SERPL W P-5'-P-CCNC: 18 U/L (ref 0–50)
ANION GAP SERPL CALCULATED.3IONS-SCNC: 5 MMOL/L (ref 3–14)
AST SERPL W P-5'-P-CCNC: 28 U/L (ref 0–45)
BILIRUB SERPL-MCNC: 0.3 MG/DL (ref 0.2–1.3)
BUN SERPL-MCNC: 13 MG/DL (ref 7–30)
CALCIUM SERPL-MCNC: 8.7 MG/DL (ref 8.5–10.1)
CHLORIDE SERPL-SCNC: 107 MMOL/L (ref 94–109)
CHOLEST SERPL-MCNC: 205 MG/DL
CO2 SERPL-SCNC: 28 MMOL/L (ref 20–32)
CREAT SERPL-MCNC: 0.6 MG/DL (ref 0.52–1.04)
GFR SERPL CREATININE-BSD FRML MDRD: 86 ML/MIN/{1.73_M2}
GLUCOSE SERPL-MCNC: 108 MG/DL (ref 70–99)
HDLC SERPL-MCNC: 49 MG/DL
LDLC SERPL CALC-MCNC: 123 MG/DL
NONHDLC SERPL-MCNC: 156 MG/DL
POTASSIUM SERPL-SCNC: 3.9 MMOL/L (ref 3.4–5.3)
PROT SERPL-MCNC: 7.2 G/DL (ref 6.8–8.8)
SODIUM SERPL-SCNC: 140 MMOL/L (ref 133–144)
T4 FREE SERPL-MCNC: 1.19 NG/DL (ref 0.76–1.46)
TRIGL SERPL-MCNC: 165 MG/DL
TSH SERPL DL<=0.005 MIU/L-ACNC: 0.33 MU/L (ref 0.4–4)

## 2020-07-31 LAB — DEPRECATED CALCIDIOL+CALCIFEROL SERPL-MC: 42 UG/L (ref 20–75)

## 2020-08-03 VITALS
WEIGHT: 209 LBS | OXYGEN SATURATION: 94 % | TEMPERATURE: 97.3 F | SYSTOLIC BLOOD PRESSURE: 126 MMHG | DIASTOLIC BLOOD PRESSURE: 74 MMHG | HEART RATE: 81 BPM | BODY MASS INDEX: 34.82 KG/M2 | HEIGHT: 65 IN

## 2020-08-29 DIAGNOSIS — I82.409 DVT (DEEP VENOUS THROMBOSIS) (H): ICD-10-CM

## 2020-08-31 ENCOUNTER — ANTICOAGULATION THERAPY VISIT (OUTPATIENT)
Dept: NURSING | Facility: CLINIC | Age: 80
End: 2020-08-31

## 2020-08-31 DIAGNOSIS — I82.409 DVT (DEEP VENOUS THROMBOSIS) (H): Chronic | ICD-10-CM

## 2020-08-31 LAB
CAPILLARY BLOOD COLLECTION: NORMAL
INR PPP: 5.4 (ref 0.86–1.14)

## 2020-08-31 PROCEDURE — 36416 COLLJ CAPILLARY BLOOD SPEC: CPT | Performed by: INTERNAL MEDICINE

## 2020-08-31 PROCEDURE — 99207 ZZC NO CHARGE NURSE ONLY: CPT

## 2020-08-31 PROCEDURE — 85610 PROTHROMBIN TIME: CPT | Performed by: INTERNAL MEDICINE

## 2020-08-31 RX ORDER — WARFARIN SODIUM 5 MG/1
TABLET ORAL
Qty: 90 TABLET | Refills: 0 | Status: SHIPPED | OUTPATIENT
Start: 2020-08-31 | End: 2021-04-20

## 2020-08-31 NOTE — PROGRESS NOTES
ANTICOAGULATION FOLLOW-UP CLINIC VISIT    Patient Name:  Susan Ferguson  Date:  2020  Contact Type:  Telephone/ Patient    SUBJECTIVE:  Patient Findings     Positives:   Change in medications    Comments:   Pt has been taking viktoria root for aches and pains along with Tylenol. According to micromedex, viktoria can elevate the INR and cause increased bleeding.         Clinical Outcomes     Comments:   Pt has been taking viktoria root for aches and pains along with Tylenol. According to micromedex, viktoria can elevate the INR and cause increased bleeding.            OBJECTIVE    Recent labs: (last 7 days)     20  0846   INR 5.40*       ASSESSMENT / PLAN  INR assessment SUPRA    Recheck INR In: 3 DAYS    INR Location Outside lab      Anticoagulation Summary  As of 2020    INR goal:   2.0-3.0   TTR:   45.3 % (1 y)   INR used for dosin.40! (2020)   Warfarin maintenance plan:   5 mg (5 mg x 1) every day   Full warfarin instructions:   : Hold; : Hold; Otherwise 5 mg every day   Weekly warfarin total:   35 mg   Plan last modified:   Mila Keith RN (2020)   Next INR check:   9/3/2020   Priority:   Maintenance   Target end date:   2020    Indications    DVT (deep venous thrombosis) (H) [I82.409]  Factor V Leiden (H) [D68.51]  History of deep venous thrombosis [Z86.718]             Anticoagulation Episode Summary     INR check location:       Preferred lab:       Send INR reminders to:   YARI WHITE    Comments:         Anticoagulation Care Providers     Provider Role Specialty Phone number    Jimi Wan MD Referring Internal Medicine [phone]            See the Encounter Report to view Anticoagulation Flowsheet and Dosing Calendar (Go to Encounters tab in chart review, and find the Anticoagulation Therapy Visit)    Pt INR is 5.4 today. See findings. Pt advised to HOLD warfarin/jantoven today 20 and tomorrow 20. Then continue 5 mg daily. Pt states that she  will eat spinach tonight at dinner. Pt advised to stop taking the viktoria root for now. Pt informed that if she finds that the viktoria root really helps, then her maintenance dose will need to be reduced. Recheck INR in 3 days. Pt scheduled on 9/3/20 at 10:30 am in [place]. Pt advised to be careful since she is at higher risk for bleeding. Pt requested a refill which was done and sent to her pharmacy. Susan aware if signs of clotting (pain, tenderness, swelling, color change in leg or arm, SOB) and bleeding occur (blood in stool, urine, large bruising, bleeding gums, nosebleeds) to have INR check sooner. If sx severe report to ER or concerned for stroke call 911. If general questions or concerns arise, call clinic.         Mila Keith RN

## 2020-09-03 ENCOUNTER — ANTICOAGULATION THERAPY VISIT (OUTPATIENT)
Dept: NURSING | Facility: CLINIC | Age: 80
End: 2020-09-03
Payer: COMMERCIAL

## 2020-09-03 DIAGNOSIS — Z86.718 HISTORY OF DEEP VENOUS THROMBOSIS: ICD-10-CM

## 2020-09-03 DIAGNOSIS — D68.51 FACTOR V LEIDEN (H): ICD-10-CM

## 2020-09-03 DIAGNOSIS — I82.409 DVT (DEEP VENOUS THROMBOSIS) (H): Chronic | ICD-10-CM

## 2020-09-03 DIAGNOSIS — I82.409 DVT (DEEP VENOUS THROMBOSIS) (H): ICD-10-CM

## 2020-09-03 LAB
CAPILLARY BLOOD COLLECTION: NORMAL
INR PPP: 1.6 (ref 0.86–1.14)

## 2020-09-03 PROCEDURE — 36416 COLLJ CAPILLARY BLOOD SPEC: CPT | Performed by: INTERNAL MEDICINE

## 2020-09-03 PROCEDURE — 85610 PROTHROMBIN TIME: CPT | Performed by: INTERNAL MEDICINE

## 2020-09-03 PROCEDURE — 99207 ZZC NO CHARGE NURSE ONLY: CPT

## 2020-09-03 NOTE — PROGRESS NOTES
ANTICOAGULATION FOLLOW-UP CLINIC VISIT    Patient Name:  Susan Ferguson  Date:  9/3/2020  Contact Type:  Telephone    SUBJECTIVE:  Patient Findings     Comments:   The patient was assessed for diet, medication, and activity level changes, missed or extra doses, bruising or bleeding, with no problem findings. Week before last Susan had eaten LOTS of viktoria.            Clinical Outcomes     Comments:   The patient was assessed for diet, medication, and activity level changes, missed or extra doses, bruising or bleeding, with no problem findings. Week before last Susan had eaten LOTS of viktoria.               OBJECTIVE    Recent labs: (last 7 days)     20  1039   INR 1.60*       ASSESSMENT / PLAN  INR assessment SUB    Recheck INR In: 2 WEEKS    INR Location Clinic      Anticoagulation Summary  As of 9/3/2020    INR goal:   2.0-3.0   TTR:   45.5 % (1 y)   INR used for dosin.60! (9/3/2020)   Warfarin maintenance plan:   5 mg (5 mg x 1) every day   Full warfarin instructions:   9/3: 7.5 mg; Otherwise 5 mg every day   Weekly warfarin total:   35 mg   Plan last modified:   Mila Keith RN (2020)   Next INR check:   2020   Priority:   Maintenance   Target end date:   2020    Indications    DVT (deep venous thrombosis) (H) [I82.409]  Factor V Leiden (H) [D68.51]  History of deep venous thrombosis [Z86.718]             Anticoagulation Episode Summary     INR check location:       Preferred lab:       Send INR reminders to:   YARI WHITE    Comments:         Anticoagulation Care Providers     Provider Role Specialty Phone number    Jimi Wan MD Referring Internal Medicine [phone]            See the Encounter Report to view Anticoagulation Flowsheet and Dosing Calendar (Go to Encounters tab in chart review, and find the Anticoagulation Therapy Visit)        Eden Montez RN

## 2020-09-17 ENCOUNTER — HOSPITAL ENCOUNTER (EMERGENCY)
Facility: CLINIC | Age: 80
Discharge: HOME | End: 2020-09-17
Attending: EMERGENCY MEDICINE | Admitting: EMERGENCY MEDICINE
Payer: COMMERCIAL

## 2020-09-17 ENCOUNTER — ANTICOAGULATION THERAPY VISIT (OUTPATIENT)
Dept: NURSING | Facility: CLINIC | Age: 80
End: 2020-09-17

## 2020-09-17 ENCOUNTER — APPOINTMENT (OUTPATIENT)
Dept: CT IMAGING | Facility: CLINIC | Age: 80
End: 2020-09-17
Attending: EMERGENCY MEDICINE
Payer: COMMERCIAL

## 2020-09-17 ENCOUNTER — APPOINTMENT (OUTPATIENT)
Dept: ULTRASOUND IMAGING | Facility: CLINIC | Age: 80
End: 2020-09-17
Attending: EMERGENCY MEDICINE
Payer: COMMERCIAL

## 2020-09-17 VITALS
WEIGHT: 200 LBS | SYSTOLIC BLOOD PRESSURE: 162 MMHG | BODY MASS INDEX: 33.32 KG/M2 | OXYGEN SATURATION: 95 % | HEIGHT: 65 IN | DIASTOLIC BLOOD PRESSURE: 87 MMHG | TEMPERATURE: 98.3 F | HEART RATE: 84 BPM | RESPIRATION RATE: 18 BRPM

## 2020-09-17 DIAGNOSIS — K59.00 CONSTIPATION, UNSPECIFIED CONSTIPATION TYPE: ICD-10-CM

## 2020-09-17 DIAGNOSIS — I82.409 DVT (DEEP VENOUS THROMBOSIS) (H): ICD-10-CM

## 2020-09-17 DIAGNOSIS — N83.8 OVARIAN MASS: ICD-10-CM

## 2020-09-17 DIAGNOSIS — D68.51 FACTOR V LEIDEN (H): ICD-10-CM

## 2020-09-17 DIAGNOSIS — R79.1 SUPRATHERAPEUTIC INR: ICD-10-CM

## 2020-09-17 DIAGNOSIS — Z86.718 HISTORY OF DEEP VENOUS THROMBOSIS: ICD-10-CM

## 2020-09-17 DIAGNOSIS — I82.409 DVT (DEEP VENOUS THROMBOSIS) (H): Chronic | ICD-10-CM

## 2020-09-17 DIAGNOSIS — K64.4 EXTERNAL HEMORRHOIDS: ICD-10-CM

## 2020-09-17 LAB
ALBUMIN UR-MCNC: 10 MG/DL
ANION GAP SERPL CALCULATED.3IONS-SCNC: 4 MMOL/L (ref 3–14)
APPEARANCE UR: CLEAR
BASOPHILS # BLD AUTO: 0 10E9/L (ref 0–0.2)
BASOPHILS NFR BLD AUTO: 0.3 %
BILIRUB UR QL STRIP: NEGATIVE
BUN SERPL-MCNC: 12 MG/DL (ref 7–30)
CALCIUM SERPL-MCNC: 8.3 MG/DL (ref 8.5–10.1)
CAPILLARY BLOOD COLLECTION: NORMAL
CHLORIDE SERPL-SCNC: 107 MMOL/L (ref 94–109)
CO2 SERPL-SCNC: 27 MMOL/L (ref 20–32)
COLOR UR AUTO: YELLOW
CREAT SERPL-MCNC: 0.64 MG/DL (ref 0.52–1.04)
DIFFERENTIAL METHOD BLD: ABNORMAL
EOSINOPHIL # BLD AUTO: 0.1 10E9/L (ref 0–0.7)
EOSINOPHIL NFR BLD AUTO: 1.3 %
ERYTHROCYTE [DISTWIDTH] IN BLOOD BY AUTOMATED COUNT: 14.3 % (ref 10–15)
GFR SERPL CREATININE-BSD FRML MDRD: 84 ML/MIN/{1.73_M2}
GLUCOSE SERPL-MCNC: 94 MG/DL (ref 70–99)
GLUCOSE UR STRIP-MCNC: NEGATIVE MG/DL
HCT VFR BLD AUTO: 41.5 % (ref 35–47)
HGB BLD-MCNC: 13.8 G/DL (ref 11.7–15.7)
HGB UR QL STRIP: NEGATIVE
HYALINE CASTS #/AREA URNS LPF: 4 /LPF (ref 0–2)
IMM GRANULOCYTES # BLD: 0 10E9/L (ref 0–0.4)
IMM GRANULOCYTES NFR BLD: 0 %
INR PPP: 5.29 (ref 0.86–1.14)
INR PPP: 6.09 (ref 0.86–1.14)
KETONES UR STRIP-MCNC: 5 MG/DL
LEUKOCYTE ESTERASE UR QL STRIP: NEGATIVE
LYMPHOCYTES # BLD AUTO: 0.7 10E9/L (ref 0.8–5.3)
LYMPHOCYTES NFR BLD AUTO: 11.3 %
MCH RBC QN AUTO: 29.7 PG (ref 26.5–33)
MCHC RBC AUTO-ENTMCNC: 33.3 G/DL (ref 31.5–36.5)
MCV RBC AUTO: 89 FL (ref 78–100)
MONOCYTES # BLD AUTO: 0.3 10E9/L (ref 0–1.3)
MONOCYTES NFR BLD AUTO: 4.1 %
MUCOUS THREADS #/AREA URNS LPF: PRESENT /LPF
NEUTROPHILS # BLD AUTO: 5 10E9/L (ref 1.6–8.3)
NEUTROPHILS NFR BLD AUTO: 83 %
NITRATE UR QL: NEGATIVE
NRBC # BLD AUTO: 0 10*3/UL
NRBC BLD AUTO-RTO: 0 /100
PH UR STRIP: 6 PH (ref 5–7)
PLATELET # BLD AUTO: 399 10E9/L (ref 150–450)
POTASSIUM SERPL-SCNC: 3.8 MMOL/L (ref 3.4–5.3)
RBC # BLD AUTO: 4.64 10E12/L (ref 3.8–5.2)
RBC #/AREA URNS AUTO: 2 /HPF (ref 0–2)
SODIUM SERPL-SCNC: 138 MMOL/L (ref 133–144)
SOURCE: ABNORMAL
SP GR UR STRIP: 1.01 (ref 1–1.03)
SQUAMOUS #/AREA URNS AUTO: 5 /HPF (ref 0–1)
TRANS CELLS #/AREA URNS HPF: <1 /HPF (ref 0–1)
UROBILINOGEN UR STRIP-MCNC: NORMAL MG/DL (ref 0–2)
WBC # BLD AUTO: 6 10E9/L (ref 4–11)
WBC #/AREA URNS AUTO: 0 /HPF (ref 0–5)

## 2020-09-17 PROCEDURE — 85610 PROTHROMBIN TIME: CPT | Performed by: INTERNAL MEDICINE

## 2020-09-17 PROCEDURE — 25000125 ZZHC RX 250: Performed by: EMERGENCY MEDICINE

## 2020-09-17 PROCEDURE — 25000128 H RX IP 250 OP 636: Performed by: EMERGENCY MEDICINE

## 2020-09-17 PROCEDURE — 80048 BASIC METABOLIC PNL TOTAL CA: CPT | Performed by: EMERGENCY MEDICINE

## 2020-09-17 PROCEDURE — 93005 ELECTROCARDIOGRAM TRACING: CPT

## 2020-09-17 PROCEDURE — 85610 PROTHROMBIN TIME: CPT | Performed by: EMERGENCY MEDICINE

## 2020-09-17 PROCEDURE — 85025 COMPLETE CBC W/AUTO DIFF WBC: CPT | Performed by: EMERGENCY MEDICINE

## 2020-09-17 PROCEDURE — 74177 CT ABD & PELVIS W/CONTRAST: CPT

## 2020-09-17 PROCEDURE — 81001 URINALYSIS AUTO W/SCOPE: CPT | Performed by: EMERGENCY MEDICINE

## 2020-09-17 PROCEDURE — 99285 EMERGENCY DEPT VISIT HI MDM: CPT | Mod: 25

## 2020-09-17 PROCEDURE — 36416 COLLJ CAPILLARY BLOOD SPEC: CPT | Performed by: INTERNAL MEDICINE

## 2020-09-17 PROCEDURE — 76856 US EXAM PELVIC COMPLETE: CPT

## 2020-09-17 RX ORDER — IOPAMIDOL 755 MG/ML
101 INJECTION, SOLUTION INTRAVASCULAR ONCE
Status: COMPLETED | OUTPATIENT
Start: 2020-09-17 | End: 2020-09-17

## 2020-09-17 RX ORDER — POLYETHYLENE GLYCOL 3350 17 G/17G
1 POWDER, FOR SOLUTION ORAL DAILY
Qty: 119 G | Refills: 0 | Status: SHIPPED | OUTPATIENT
Start: 2020-09-17 | End: 2020-09-24

## 2020-09-17 RX ADMIN — IOPAMIDOL 101 ML: 755 INJECTION, SOLUTION INTRAVENOUS at 12:35

## 2020-09-17 RX ADMIN — SODIUM CHLORIDE 79 ML: 9 INJECTION, SOLUTION INTRAVENOUS at 12:35

## 2020-09-17 ASSESSMENT — ENCOUNTER SYMPTOMS
ABDOMINAL DISTENTION: 1
ABDOMINAL PAIN: 1
RECTAL PAIN: 1
COUGH: 0
CONSTIPATION: 1
SORE THROAT: 0
SHORTNESS OF BREATH: 0
DYSURIA: 0
FEVER: 0
DIFFICULTY URINATING: 0

## 2020-09-17 ASSESSMENT — MIFFLIN-ST. JEOR: SCORE: 1378.07

## 2020-09-17 NOTE — DISCHARGE INSTRUCTIONS
Your INR is 6 today.  Hold your Coumadin for 3 days.  Get your INR rechecked on Monday.  CAT scan and ultrasound showed a suspicious mass on the right ovary.  This is concerning for tumor.  Please follow-up with the referral provided for further evaluation.

## 2020-09-17 NOTE — PROGRESS NOTES
Anticoagulation Management    Unable to reach Susan today.    Today's INR result of 6.09 is supratherapeutic (goal INR of 2.0-3.0).  Result received from: External Lab    Follow up required to confirm warfarin dose taken and assess for changes    Left message to hold warfarin tonight.     Patient was seen in the ER today, sent home and told to hold x 3 days.  Attempted to reach patient to discuss dosing an recheck date.        Anticoagulation clinic to follow up    April Blair RN

## 2020-09-17 NOTE — ED TRIAGE NOTES
Pt stated was constipated two days ago, strained and there is a 'buldge at my belly button and I have a hemrroid.' Pt also states rectal numbness. Pt states INR was 7.9 today. And pt is dizzy and SOB

## 2020-09-17 NOTE — ED AVS SNAPSHOT
Emergency Department  [address] [facility] [address]  Phone:  [phone]  Fax:  [phone]                                    Susan Ferguson   MRN: [id number]    Department:   Emergency Department   Date of Visit:  9/17/2020           After Visit Summary Signature Page    I have received my discharge instructions, and my questions have been answered. I have discussed any challenges I see with this plan with the nurse or doctor.    ..........................................................................................................................................  Patient/Patient Representative Signature      ..........................................................................................................................................  Patient Representative Print Name and Relationship to Patient    ..................................................               ................................................  Date                                   Time    ..........................................................................................................................................  Reviewed by Signature/Title    ...................................................              ..............................................  Date                                               Time          22EPIC Rev 08/18

## 2020-09-17 NOTE — ED PROVIDER NOTES
History     Chief Complaint:  Myriad of complaints     The history is provided by the patient.      Susan Ferguson is a 80 year old female with history of atrial fibrillation, DVT/PE, Factor V Leiden on Jantoven, hypertension, and hyperthyroidism who presents with a myriad of complaints. First, she reports an elevated INR today of 5.29 and was directed to come into the ED by her PMD, Dr. Wan. On 09/03 she had an INR of 1.6. She states in the interim she did not change her dose of 5 mg daily Jantoven. She notes that she is eating more fruits and vegetables, but is not eating more meat. She denies any bleeding.    She also has been struggling with intermittent hemorrhoids since March. She feels that she has another one as she has returning rectal pain. She has been feeling more constipated since this hemorrhoid started she has had sharp abdominal pain and bloating. She has been trying Metamucil with no relief.     Finally, she has bilateral chronic pedal edema. This is chronic and something she discussed during her recent Family Practice Evaluation. She states she takes the Lasix PRN when her feet swell, but has not taken any Lasix of a while. She denies fever, cough, shortness of breath, sore throat, chest pain, difficulty urinating, or dysuria.    Allergies:  Codeine  Keflex   Latex  Percocet   Sulfa Drugs      Medications:    Lasix   Jantoven    Past Medical History:    Atrial fibrillation  Carpal tunnel syndrome  DVT  Edema  Factor V Leiden   Hypertension  Hyperthyroidism  Osteopenia  Pulmonary embolism   Morbid obesity     Past Surgical History:    Atrial ablation   Right hip replacement   Distal femur fracture repair   [place] teeth extraction     Family History:    Parkinson's disease   Diabetes mellitus   Arthritis  Stomach cancer   Guillain-barre syndrome     Social History:  The patient was unaccompanied to the ED.  Smoking Status: Never Smoker  Smokeless Tobacco: Never Used  Alcohol Use: No  Drug  "Use: No  PCP: Jimi Wan  Marital Status:        Review of Systems   Constitutional: Negative for fever.   HENT: Negative for sore throat.    Respiratory: Negative for cough and shortness of breath.    Cardiovascular: Negative for chest pain.   Gastrointestinal: Positive for abdominal distention, abdominal pain, constipation and rectal pain.   Genitourinary: Negative for difficulty urinating and dysuria.   All other systems reviewed and are negative.    Physical Exam     Patient Vitals for the past 24 hrs:   BP Temp Temp src Pulse Resp SpO2 Height Weight   09/17/20 1536 (!) 162/87 -- -- 84 -- 95 % -- --   09/17/20 1100 -- -- -- -- -- 95 % -- --   09/17/20 1048 (!) 180/86 98.3  F (36.8  C) Oral 83 18 93 % 1.651 m (5' 5\") 90.7 kg (200 lb)       Physical Exam  Constitutional:  Patient is oriented to person, place, and time. They appear well-developed and well-nourished. Mild distress secondary to rectal pain.    HENT:   Mouth/Throat:   Oropharynx is clear and moist.   Eyes:    Conjunctivae normal and EOM are normal. Pupils are equal, round, and reactive to light.   Neck:    Normal range of motion.   Cardiovascular: Normal rate, regular rhythm and normal heart sounds.  Exam reveals no gallop and no friction rub.  No murmur heard.  Pulmonary/Chest:  Effort normal and breath sounds normal. Patient has no wheezes. Patient has no rales.   Abdominal:   Soft. Bowel sounds are normal. Patient exhibits no mass. Mild left lower abdominal pain. There is no rebound and no guarding.   Rectal:   Non thrombosed external hemorrhoids.   Musculoskeletal:  Normal range of motion. Chronic venous stasis, bogginess, and dry skin. 2+ edema.   Neurological:   Patient is alert and oriented to person, place, and time. Patient has normal strength. No cranial nerve deficit or sensory deficit. GCS 15  Skin:   See Musculoskeletal.  Psychiatric:   Patient has a normal mood and affect. Patient's behavior is normal. Judgment and thought " content normal.     Emergency Department Course     Imaging:  Radiology findings were communicated with the patient who voiced understanding of the findings.    US Pelvic Complete w Transvaginal  IMPRESSION:  1.  The right ovary is visualized transabdominally only, and appears  to contain an ill-defined cystic and solid mass, suspicious for  ovarian malignancy.  2.  An intramural or submucosal fibroid in the uterine body measures  4.6 cm.  3.  Moderate ascites.  Reading per radiology    CT Abdomen Pelvis w Contrast  IMPRESSION:   1.  Right ovarian mass measuring 7.6 x 5.2 cm which is concerning for  malignancy. Recommend pelvic ultrasound for further evaluation.  2.  Extensive omental caking with soft tissue deposits in the upper  abdomen extending down to the lower pelvis and into the posterior  cul-de-sac. 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  Mild to moderate abdominal and pelvic ascites.  Reading per radiology     Laboratory:  Laboratory findings were communicated with the patient who voiced understanding of the findings.    UA with microscopic: urineketon 5, protein albumin 10, squamous epithelial 5, mucous present, hyaline casts 4 o/w WNL    CBC: WBC 6.0, HGB 13.8,   BMP: calcium 8.3(L) o/w WNL (Creatinine 0.64)  INR: 6.09(HH)    Emergency Department Course:  Nursing notes and vitals reviewed.    1105 I performed an exam of the patient as documented above.     IV was inserted and blood was drawn for laboratory testing, results above.    The patient provided a urine sample here in the emergency department. This was sent for laboratory testing, findings above.    The patient was sent for a CT Abdomen Pelvis while in the emergency department, results above.      1346 I returned to update the patient on her findings and plan of US.     The patient was sent for a US Pelvic Complete with Transvaginal while in the emergency department, results above.      1539 I returned to update the  patient.     Findings and plan explained to the Patient. Patient discharged home with instructions regarding supportive care, medications, and reasons to return. The importance of close follow-up was reviewed. The patient was prescribed Miralax     Impression & Plan    Medical Decision Making:  Susan Ferguson is a 80 year old female who presents with concerns for elevated INR and constipation. She had slight abdominal pain on the left lower quadrant to palpation, but not a surgical abdomen. Blood work was obtained. She is in fact supra therapeutic on her INR. CT demonstrated a suspicious mass in her right ovary with some other findings in the abdomen. Ultrasound was recommended. Ultrasound showed a suspicious mass in the right ovary. Otherwise, there are no other acute findings on the patient's CT. I did have a victor m discussion with the patient the concerning nature of this mass. I did make her aware that I was concerned about cancer. The rest of her blood work is unremarkable. The patient did understand the concerning mass, and in the back of her mind was wondering if there was something more ominous going on there than constipation.    At this time, the patient can go home. I will refer to oncology to follow up later this week. Regarding her supra therapeutic INR, she will hold her Coumadin for 3 days and then have her INR rechecked. Her main concern was constipation, so I will write for Miralax though I do not see any signs of impaction on her CT. The patient was then discharged. Of note, her blood pressure was elevated here. I did recheck and it was 167 systolic. She will have this checked at her primary care physician's office. She tells me she has white coat syndrome.       Diagnosis:    ICD-10-CM    1. Ovarian mass  N83.8    2. Constipation, unspecified constipation type  K59.00    3. External hemorrhoids  K64.4    4. Supratherapeutic INR  R79.1        Disposition:   The patient is discharged to home.      Discharge Medications:  New Prescriptions    POLYETHYLENE GLYCOL (MIRALAX) 17 GM/DOSE POWDER    Take 17 g (1 capful) by mouth daily for 7 days       Scribe Disclosure:  I, Radhaisabelle Kendall, am serving as a scribe at 11:08 AM on 9/17/2020 to document services personally performed by Alicia Brown MD based on my observations and the provider's statements to me.    EMERGENCY DEPARTMENT       Alicia Brown MD  09/18/20 4206

## 2020-09-18 NOTE — PROGRESS NOTES
ANTICOAGULATION MANAGEMENT     Patient Name:  Susan Ferguson  Date:  2020    ASSESSMENT /SUBJECTIVE:    Today's INR result of 6.09 is supratherapeutic. Goal INR of 2.0-3.0      Warfarin dose taken: Warfarin taken as previously instructed    Diet: No new diet changes affecting INR    Medication changes/ interactions: No new medications/supplements affecting INR    Previous INR: Subtherapeutic     S/S of bleeding or thromboembolism: No    New injury or illness: Yes: Patient seen in ED yesterday and states that they told her she has a tumor in her R ovary. She was referred to oncology.    Upcoming surgery, procedure or cardioversion: No    Additional findings: None      PLAN:    Spoke with Susan regarding INR result and instructed:     Warfarin Dosing Instructions: hold yesterday, today and tomorrow (per ED recommendation) then continue your current warfarin dose of 5 mg on     Instructed patient to follow up no later than: 20  Lab visit scheduled    Education provided: None required      Susan verbalizes understanding and agrees to warfarin dosing plan.    Instructed to call the Anticoagulation Clinic for any changes, questions or concerns. (#[phone])        Meghna Lees RN      OBJECTIVE:  Recent labs: (last 7 days)     20  1011 20  1133   INR 5.29* 6.09*         No question data found.  Anticoagulation Summary  As of 2020    INR goal:   2.0-3.0   TTR:   46.7 % (1 y)   INR used for dosin.09! (2020)   Warfarin maintenance plan:   5 mg (5 mg x 1) every day   Full warfarin instructions:   : Hold; : Hold; : Hold; Otherwise 5 mg every day   Weekly warfarin total:   35 mg   Plan last modified:   Mila Keith RN (2020)   Next INR check:   2020   Priority:   Maintenance   Target end date:   2020    Indications    DVT (deep venous thrombosis) (H) [I82.409]  Factor V Leiden (H) [D68.51]  History of deep venous thrombosis  [Z86.718]             Anticoagulation Episode Summary     INR check location:       Preferred lab:       Send INR reminders to:   YARI WHITE    Comments:         Anticoagulation Care Providers     Provider Role Specialty Phone number    Jimi Wan MD Referring Internal Medicine [phone]

## 2020-09-21 ENCOUNTER — ANTICOAGULATION THERAPY VISIT (OUTPATIENT)
Dept: NURSING | Facility: CLINIC | Age: 80
End: 2020-09-21

## 2020-09-21 DIAGNOSIS — I82.409 DVT (DEEP VENOUS THROMBOSIS) (H): Chronic | ICD-10-CM

## 2020-09-21 LAB — INR PPP: 2.9 (ref 0.86–1.14)

## 2020-09-21 PROCEDURE — 99207 ZZC NO CHARGE NURSE ONLY: CPT

## 2020-09-21 PROCEDURE — 85610 PROTHROMBIN TIME: CPT | Performed by: INTERNAL MEDICINE

## 2020-09-21 PROCEDURE — 36416 COLLJ CAPILLARY BLOOD SPEC: CPT | Performed by: INTERNAL MEDICINE

## 2020-09-21 NOTE — PROGRESS NOTES
Anticoagulation Management    Unable to reach Susan today.    Today's INR result of 2.9 is therapeutic (goal INR of 2.0-3.0).  Result received from: Clinic Lab    Follow up required to discuss dosing instructions and confirm understanding of instructions    Left message to take reduced dose of warfarin, 2.5 mg tonight.     Maintenance dose reduced by 14.3%    Transfer to [phone]      Anticoagulation clinic to follow up    Mila Keith RN  ANTICOAGULATION FOLLOW-UP CLINIC VISIT    Patient Name:  Susan Ferguson  Date:  2020  Contact Type:  Telephone/ Patient    SUBJECTIVE:  Patient Findings     Positives:   Emergency department visit    Comments:   Pt may have cancer. See 20 ER visit. This may explain why her INR has been trending up. Pt states that she is going to [place] with her daughter on 20. Pt is going to stop at [facility] in [place] at 10:15 am on 20 on her way down to [place].        Clinical Outcomes     Comments:   Pt may have cancer. See 20 ER visit. This may explain why her INR has been trending up. Pt states that she is going to [place] with her daughter on 20. Pt is going to stop at [facility] in [place] at 10:15 am on 20 on her way down to [place].           OBJECTIVE    Recent labs: (last 7 days)     20  1546   INR 2.90*       ASSESSMENT / PLAN  INR assessment THER    Recheck INR In: 1 WEEK    INR Location Outside lab      Anticoagulation Summary  As of 2020    INR goal:   2.0-3.0   TTR:   46.6 % (1 y)   INR used for dosin.90 (2020)   Warfarin maintenance plan:   2.5 mg (5 mg x 0.5) every Mon, Fri; 5 mg (5 mg x 1) all other days   Full warfarin instructions:   2.5 mg every Mon, Fri; 5 mg all other days   Weekly warfarin total:   30 mg   Plan last modified:   Mila Keith RN (2020)   Next INR check:   2020   Priority:   Maintenance   Target end date:   2020    Indications    DVT (deep venous  thrombosis) (H) [I82.409]  Factor V Leiden (H) [D68.51]  History of deep venous thrombosis [Z86.718]             Anticoagulation Episode Summary     INR check location:       Preferred lab:       Send INR reminders to:   YARI WHITE    Comments:         Anticoagulation Care Providers     Provider Role Specialty Phone number    Jimi Wan MD Referring Internal Medicine [phone]            See the Encounter Report to view Anticoagulation Flowsheet and Dosing Calendar (Go to Encounters tab in chart review, and find the Anticoagulation Therapy Visit)    Pt INR is 2.9 today. See findings. Maintenance dose reduced by 14.3%.Pt advised to take 2.5 mg on Mondays and Fridays and 5 mg all the other days. Recheck INR in 1 week scheduled on 9/28/20 at 10:15 am at [facility]. Pt usually goes to [facility] but there were no morning appointments available. Susan aware if signs of clotting (pain, tenderness, swelling, color change in leg or arm, SOB) and bleeding occur (blood in stool, urine, large bruising, bleeding gums, nosebleeds) to have INR check sooner. If sx severe report to ER or concerned for stroke call 911. If general questions or concerns arise, call clinic.         Mila Keith RN

## 2020-09-25 ENCOUNTER — TRANSFERRED RECORDS (OUTPATIENT)
Dept: HEALTH INFORMATION MANAGEMENT | Facility: CLINIC | Age: 80
End: 2020-09-25

## 2020-09-27 ENCOUNTER — TRANSFERRED RECORDS (OUTPATIENT)
Dept: HEALTH INFORMATION MANAGEMENT | Facility: CLINIC | Age: 80
End: 2020-09-27

## 2020-09-28 ENCOUNTER — TELEPHONE (OUTPATIENT)
Dept: FAMILY MEDICINE | Facility: CLINIC | Age: 80
End: 2020-09-28

## 2020-09-28 LAB — ALT SERPL-CCNC: 13 U/L (ref 7–45)

## 2020-09-28 NOTE — TELEPHONE ENCOUNTER
Reason for Call:  Pt update    Detailed comments:   Patient called to cancel her INR appt for [facility] today.  She states that she is currently at [facility] and being treated for fluid in her abdomen.  States that she had a consult at [facility] scheduled for next Monday, but was admitted via emergency.  Her consult was for a tumor on her ovaries.      Pt states that she would like to update her provider, Dr. Wan and let the INR team know also.      Phone Number Patient can be reached at: Home number on file [phone] (home)    Best Time: Any     Can we leave a detailed message on this number? YES    Call taken on 9/28/2020 at 7:30 AM by Arminda Banda

## 2020-09-29 ENCOUNTER — TRANSFERRED RECORDS (OUTPATIENT)
Dept: HEALTH INFORMATION MANAGEMENT | Facility: CLINIC | Age: 80
End: 2020-09-29

## 2020-09-29 LAB
GLUCOSE SERPL-MCNC: 91 MG/DL (ref 70–140)
POTASSIUM SERPL-SCNC: 4.6 MMOL/L (ref 3.6–5.2)

## 2020-09-30 ENCOUNTER — TRANSFERRED RECORDS (OUTPATIENT)
Dept: HEALTH INFORMATION MANAGEMENT | Facility: CLINIC | Age: 80
End: 2020-09-30

## 2020-10-02 ENCOUNTER — TRANSFERRED RECORDS (OUTPATIENT)
Dept: HEALTH INFORMATION MANAGEMENT | Facility: CLINIC | Age: 80
End: 2020-10-02

## 2020-10-05 ENCOUNTER — TRANSFERRED RECORDS (OUTPATIENT)
Dept: HEALTH INFORMATION MANAGEMENT | Facility: CLINIC | Age: 80
End: 2020-10-05

## 2020-10-07 ENCOUNTER — TELEPHONE (OUTPATIENT)
Dept: FAMILY MEDICINE | Facility: CLINIC | Age: 80
End: 2020-10-07

## 2020-10-07 ENCOUNTER — MEDICAL CORRESPONDENCE (OUTPATIENT)
Dept: HEALTH INFORMATION MANAGEMENT | Facility: CLINIC | Age: 80
End: 2020-10-07

## 2020-10-07 NOTE — LETTER
October 7, 2020      Susan Ferguson  [address] [place]   [place] [address]      Dear Susan,    We are contacting you because our records show you were due for an INR on 9/28/20.      There are potentially serious risks when taking warfarin without careful monitoring, and we want to make sure you are safely managed.    Please call the INR clinic at [phone] and we will be happy to help you schedule an appointment.  If there has been a change in your care, or other concerns, please let us know so we can help and/or update our records.    Sincerely,        Jimi Wan MD

## 2020-10-09 ENCOUNTER — MEDICAL CORRESPONDENCE (OUTPATIENT)
Dept: HEALTH INFORMATION MANAGEMENT | Facility: CLINIC | Age: 80
End: 2020-10-09

## 2020-10-13 ENCOUNTER — ANTICOAGULATION THERAPY VISIT (OUTPATIENT)
Dept: FAMILY MEDICINE | Facility: CLINIC | Age: 80
End: 2020-10-13

## 2020-10-13 ENCOUNTER — TELEPHONE (OUTPATIENT)
Dept: FAMILY MEDICINE | Facility: CLINIC | Age: 80
End: 2020-10-13

## 2020-10-13 DIAGNOSIS — I82.409 DVT (DEEP VENOUS THROMBOSIS) (H): ICD-10-CM

## 2020-10-13 DIAGNOSIS — Z86.718 HISTORY OF DEEP VENOUS THROMBOSIS: ICD-10-CM

## 2020-10-13 DIAGNOSIS — D68.51 FACTOR V LEIDEN (H): ICD-10-CM

## 2020-10-13 LAB — INR PPP: 3.2 (ref 0.9–1.1)

## 2020-10-13 NOTE — TELEPHONE ENCOUNTER
Returned call. OK'd requested orders.  Orders will be faxed to PCP for review and signature.   Lien Ray RN

## 2020-10-13 NOTE — PROGRESS NOTES
ANTICOAGULATION MANAGEMENT     Patient Name:  Susan Ferguson  Date:  10/13/2020    ASSESSMENT /SUBJECTIVE:    Today's INR result of 3.2 is supratherapeutic. Goal INR of 2.0-3.0      Warfarin dose taken: Warfarin taken as instructed    Diet: No new diet changes affecting INR    Medication changes/ interactions: Started chemotherapy on 10/9/20    Previous INR: Therapeutic     S/S of bleeding or thromboembolism: No    New injury or illness: Yes: newly diagnosed ovarian cancer    Upcoming surgery, procedure or cardioversion: No    Additional findings: None      PLAN:    Telephone call with home care nurse maria elena Ramirez regarding INR result and instructed:     Warfarin Dosing Instructions: Continue your current warfarin dose 2.5 mg on mon/fri and 5 mg all other days    Instructed patient to follow up no later than: 1 week  Orders given to  Homecare nurse/facility to recheck    Education provided: None required      Ashley, home care, verbalizes understanding and agrees to warfarin dosing plan.    Instructed to call the Anticoagulation Clinic for any changes, questions or concerns. (#[phone])        Seda Sanchez RN      OBJECTIVE:  Recent labs: (last 7 days)     10/13/20   INR 3.2*         No question data found.  Anticoagulation Summary  As of 10/13/2020    INR goal:  2.0-3.0   TTR:  46.6 % (1 y)   INR used for dosing:  3.2 (10/13/2020)   Warfarin maintenance plan:  2.5 mg (5 mg x 0.5) every Mon, Fri; 5 mg (5 mg x 1) all other days   Full warfarin instructions:  2.5 mg every Mon, Fri; 5 mg all other days   Weekly warfarin total:  30 mg   Plan last modified:  Mila Keith RN (9/21/2020)   Next INR check:     Priority:  Maintenance   Target end date:  12/19/2020    Indications    DVT (deep venous thrombosis) (H) [I82.409]  Factor V Leiden (H) [D68.51]  History of deep venous thrombosis [Z86.718]             Anticoagulation Episode Summary     INR check location:      Preferred lab:      Send INR  reminders to:  YARI WHITE    Comments:        Anticoagulation Care Providers     Provider Role Specialty Phone number    Jimi Wan MD Referring Internal Medicine [phone]

## 2020-10-13 NOTE — TELEPHONE ENCOUNTER
Reason for Call:  Home Health Care    Ashley with [facility] called regarding (reason for call): Orders    Orders are needed for this patient. All    PT: Saw patient yesterday     OT: Saw patient yesterday       Skilled Nursing: OK to initiate Skd Nursing, OT and PT        Phone Number Homecare Nurse can be reached at: [phone]    Can we leave a detailed message on this number? YES        Call taken on 10/13/2020 at 10:45 AM by Karli Garcia

## 2020-10-14 ENCOUNTER — TELEPHONE (OUTPATIENT)
Dept: FAMILY MEDICINE | Facility: CLINIC | Age: 80
End: 2020-10-14

## 2020-10-14 DIAGNOSIS — C56.2 OVARIAN CANCER, LEFT (H): Primary | ICD-10-CM

## 2020-10-14 NOTE — TELEPHONE ENCOUNTER
Routing to Dr Wan to review and advise.     Please see message below from HC---asking if patient's Lasix 20 mg daily should be increased due to 3+ edema in lower extremities and ulcer on lower right leg?      Lien VALLEJO RN,BSN

## 2020-10-14 NOTE — TELEPHONE ENCOUNTER
Reason for Call:  Home Health Care    David with [facility] called regarding (reason for call):     Orders are needed for this patient.     PT:     OT:     Skilled Nursing: for wound care, Ulcer on lower right leg, plus 3 edema on lower extremities. Should patient increase furosemide (LASIX) 20 MG tablet       Pt Provider: Dr. Wan    Phone Number Homecare Nurse can be reached at: [phone]    Can we leave a detailed message on this number? YES    Phone number patient can be reached at: Home number on file [phone] (home)    Best Time:     Call taken on 10/14/2020 at 2:48 PM by Ashly Coronado

## 2020-10-15 ENCOUNTER — MEDICAL CORRESPONDENCE (OUTPATIENT)
Dept: HEALTH INFORMATION MANAGEMENT | Facility: CLINIC | Age: 80
End: 2020-10-15

## 2020-10-15 RX ORDER — FUROSEMIDE 20 MG
20 TABLET ORAL 2 TIMES DAILY
Qty: 60 TABLET | Refills: 3 | Status: SHIPPED | OUTPATIENT
Start: 2020-10-15 | End: 2022-01-01 | Stop reason: DRUGHIGH

## 2020-10-15 NOTE — TELEPHONE ENCOUNTER
Called patient to follow-up on increasing venous stasis.  Oh by the way she notes that she has ulcer from trauma on the wheelchair at the [facility] 10 days ago which is being treated with dressing changes by the nursing a dressing and [place] wraps.  Recommended continuing the same program and increasing her Lasix to twice daily and evaluate her on Monday from her follow-up at [facility] in the [facility] for her carcinomatosis.

## 2020-10-16 ENCOUNTER — TRANSFERRED RECORDS (OUTPATIENT)
Dept: HEALTH INFORMATION MANAGEMENT | Facility: CLINIC | Age: 80
End: 2020-10-16

## 2020-10-19 ENCOUNTER — PATIENT OUTREACH (OUTPATIENT)
Dept: CARE COORDINATION | Facility: CLINIC | Age: 80
End: 2020-10-19

## 2020-10-19 NOTE — PROGRESS NOTES
Clinic Care Coordination Contact  [facility]/Voicemail    Referral Source: IP Report  Clinical Data: Care Coordinator Outreach    Outreach attempted x 1.  Left message on patient's voicemail with call back information and requested return call.    Plan: Care Coordinator will try to reach patient again in 1-2 business days.    SEGUNDO Rivas, [facility]  Clinic Care Coordinator  [facility]  [phone]  [email or web address]

## 2020-10-20 ENCOUNTER — MEDICAL CORRESPONDENCE (OUTPATIENT)
Dept: HEALTH INFORMATION MANAGEMENT | Facility: CLINIC | Age: 80
End: 2020-10-20

## 2020-10-21 DIAGNOSIS — R60.0 PERIPHERAL EDEMA: ICD-10-CM

## 2020-10-22 RX ORDER — FUROSEMIDE 20 MG
TABLET ORAL
Refills: 0
Start: 2020-10-22

## 2020-10-23 ENCOUNTER — TRANSFERRED RECORDS (OUTPATIENT)
Dept: HEALTH INFORMATION MANAGEMENT | Facility: CLINIC | Age: 80
End: 2020-10-23

## 2020-10-28 ENCOUNTER — PATIENT OUTREACH (OUTPATIENT)
Dept: NURSING | Facility: CLINIC | Age: 80
End: 2020-10-28
Payer: COMMERCIAL

## 2020-10-28 SDOH — ECONOMIC STABILITY: FOOD INSECURITY: WITHIN THE PAST 12 MONTHS, THE FOOD YOU BOUGHT JUST DIDN'T LAST AND YOU DIDN'T HAVE MONEY TO GET MORE.: NEVER TRUE

## 2020-10-28 SDOH — ECONOMIC STABILITY: INCOME INSECURITY: HOW HARD IS IT FOR YOU TO PAY FOR THE VERY BASICS LIKE FOOD, HOUSING, MEDICAL CARE, AND HEATING?: NOT HARD AT ALL

## 2020-10-28 SDOH — ECONOMIC STABILITY: TRANSPORTATION INSECURITY
IN THE PAST 12 MONTHS, HAS THE LACK OF TRANSPORTATION KEPT YOU FROM MEDICAL APPOINTMENTS OR FROM GETTING MEDICATIONS?: NO

## 2020-10-28 SDOH — ECONOMIC STABILITY: TRANSPORTATION INSECURITY
IN THE PAST 12 MONTHS, HAS LACK OF TRANSPORTATION KEPT YOU FROM MEETINGS, WORK, OR FROM GETTING THINGS NEEDED FOR DAILY LIVING?: NO

## 2020-10-28 SDOH — SOCIAL STABILITY: SOCIAL NETWORK: IN A TYPICAL WEEK, HOW MANY TIMES DO YOU TALK ON THE PHONE WITH FAMILY, FRIENDS, OR NEIGHBORS?: NEVER

## 2020-10-28 SDOH — HEALTH STABILITY: MENTAL HEALTH
STRESS IS WHEN SOMEONE FEELS TENSE, NERVOUS, ANXIOUS, OR CAN'T SLEEP AT NIGHT BECAUSE THEIR MIND IS TROUBLED. HOW STRESSED ARE YOU?: NOT AT ALL

## 2020-10-28 SDOH — ECONOMIC STABILITY: FOOD INSECURITY: WITHIN THE PAST 12 MONTHS, YOU WORRIED THAT YOUR FOOD WOULD RUN OUT BEFORE YOU GOT MONEY TO BUY MORE.: NEVER TRUE

## 2020-10-28 SDOH — SOCIAL STABILITY: SOCIAL NETWORK: ARE YOU MARRIED, WIDOWED, DIVORCED, SEPARATED, NEVER MARRIED, OR LIVING WITH A PARTNER?: DIVORCED

## 2020-10-28 SDOH — SOCIAL STABILITY: SOCIAL NETWORK: HOW OFTEN DO YOU GET TOGETHER WITH FRIENDS OR RELATIVES?: TWICE A WEEK

## 2020-10-28 ASSESSMENT — ACTIVITIES OF DAILY LIVING (ADL): DEPENDENT_IADLS:: INDEPENDENT

## 2020-10-28 NOTE — LETTER
[facility] CARE COORDINATION  [address] [place] [address]    October 28, 2020    Susan Ferguson  [address] [place] [address] [place] [id number]      Dear Susan,    I am a clinic care coordinator who works with Jimi Wan MD at [facility]. I wanted to thank you for spending the time to talk with me.  Below is a description of clinic care coordination and how I can further assist you.      The clinic care coordination team is made up of a registered nurse,  and community health worker who understand the health care system. The goal of clinic care coordination is to help you manage your health and improve access to the health care system in the most efficient manner. The team can assist you in meeting your health care goals by providing education, coordinating services, strengthening the communication among your providers and supporting you with any resource needs.    Please feel free to contact me at [phone] with any questions or concerns. We are focused on providing you with the highest-quality healthcare experience possible and that all starts with you.     Sincerely,     SEGUNDO Rivas, [facility]  Clinic Care Coordinator  [facility]  [phone]  [email or web address]

## 2020-10-28 NOTE — PROGRESS NOTES
Clinic Care Coordination Contact    Clinic Care Coordination Contact  OUTREACH    Referral Information:  Referral Source: IP Report    Primary Diagnosis: Oncology    Chief Complaint   Patient presents with     Clinic Care Coordination - Initial        Universal Utilization: Pt was hospitalized from 9/25-9/30  Clinic Utilization  Difficulty keeping appointments:: No  Compliance Concerns: No  No-Show Concerns: No  No PCP office visit in Past Year: No  Utilization    Last refreshed: 10/27/2020  8:54 PM: Hospital Admissions 0           Last refreshed: 10/27/2020  8:54 PM: ED Visits 1           Last refreshed: 10/27/2020  8:54 PM: No Show Count (past year) 0              Current as of: 10/27/2020  8:54 PM            Clinical Concerns:  CC JAN spoke with pt regarding her overall wellbeing. Pt shared that she is doing well. She has [facility] SN and PT. She is going to [place] every Friday for INR and chemo. Her daughter is supportive and assists with transportation to appointments. Pt stated that she is considering transferring to [facility] for PCP. She didn't fully explained but stated that she is unhappy with her care at  due to her previous provider leaving and only having worked with new provider for 18 months. MARIA GUADALUPE MONTOYA validated her concerns and explained that it is important for her to be comfortable with her medical care. MARIA GUADALUPE MONTOYA suggested speaking with PCP about any concerns.    Current Medical Concerns:  Oncology    Current Behavioral Concerns: none    Education Provided to patient: CC role   Pain  Pain (GOAL):: No  Health Maintenance Reviewed: Up to date  Clinical Pathway: None    Medication Management:  Not discussed     Functional Status:  Dependent ADLs:: Independent  Dependent IADLs:: Independent  Bed or wheelchair confined:: No  Mobility Status: Independent    Living Situation:  Current living arrangement:: I live in a private home with family  Type of residence:: Apartment    Lifestyle & Psychosocial  Needs:  Lifestyle     Physical activity     Days per week: Not on file     Minutes per session: Not on file     Stress: Not at all     Social Needs     Financial resource strain: Not hard at all     Food insecurity     Worry: Never true     Inability: Never true     Transportation needs     Medical: No     Non-medical: No     Diet:: Regular  Inadequate nutrition (GOAL):: No  Tube Feeding: No  Inadequate activity/exercise (GOAL):: No  Significant changes in sleep pattern (GOAL): No  Transportation means:: Family     Church or spiritual beliefs that impact treatment:: No  Mental health DX:: No  Mental health management concern (GOAL):: No  Chemical Dependency Status: Not Applicable  Informal Support system:: Children, Family   Socioeconomic History     Marital status:      Spouse name: Not on file     Number of children: Not on file     Years of education: Not on file     Highest education level: Not on file   Relationships     Social connections     Talks on phone: Never     Gets together: Twice a week     Attends Hoahaoism service: Not on file     Active member of club or organization: Not on file     Attends meetings of clubs or organizations: Not on file     Relationship status:      Intimate partner violence     Fear of current or ex partner: Not on file     Emotionally abused: Not on file     Physically abused: Not on file     Forced sexual activity: Not on file     Tobacco Use     Smoking status: Never Smoker     Smokeless tobacco: Never Used   Substance and Sexual Activity     Alcohol use: No     Alcohol/week: 0.0 standard drinks     Drug use: No     Sexual activity: Not Currently      Resources and Interventions:  Current Resources:   List of home care services:: Skilled Nursing  Community Resources: None  Supplies used at home:: Wound Care Supplies     Employment Status: retired)   )    Advance Care Plan/Directive  Advanced Care Plans/Directives on file:: No    Referrals Placed: None      Patient/Caregiver understanding: Pt reports understanding and denies any additional questions or concerns at this times. SW CC engaged in [employer] communication during encounter.    Plan: At this time, pt denies outstanding need for connection or referral to resources or assistance navigating recommended follow up care. No further outreaches will be made at this time unless a new referral is made or a change in the pt's status occurs. Patient was provided with CC SW contact information and encouraged to call with any questions or concerns.    SEUGNDO Rivas, [facility]  Clinic Care Coordinator  [facility]  [phone]  [email or web address]

## 2020-10-29 ENCOUNTER — MEDICAL CORRESPONDENCE (OUTPATIENT)
Dept: HEALTH INFORMATION MANAGEMENT | Facility: CLINIC | Age: 80
End: 2020-10-29

## 2020-10-29 ENCOUNTER — TELEPHONE (OUTPATIENT)
Dept: FAMILY MEDICINE | Facility: CLINIC | Age: 80
End: 2020-10-29

## 2020-10-30 ENCOUNTER — TRANSFERRED RECORDS (OUTPATIENT)
Dept: HEALTH INFORMATION MANAGEMENT | Facility: CLINIC | Age: 80
End: 2020-10-30

## 2020-10-30 LAB — AST SERPL-CCNC: 32 U/L (ref 8–43)

## 2020-11-03 ENCOUNTER — MEDICAL CORRESPONDENCE (OUTPATIENT)
Dept: HEALTH INFORMATION MANAGEMENT | Facility: CLINIC | Age: 80
End: 2020-11-03

## 2020-11-05 ENCOUNTER — TELEPHONE (OUTPATIENT)
Dept: FAMILY MEDICINE | Facility: CLINIC | Age: 80
End: 2020-11-05

## 2020-11-05 NOTE — TELEPHONE ENCOUNTER
Pt contacted central INR to speak to an INR nurse regarding health updates and INR's done at [facility].    Called and spoke to pt. Pt reports that she was diagnosed with ovarian cancer and has been going to [facility] once a week for treatment on Fridays. Pt states that [facility] drains fluid from her abdomen weekly.    Pt reports that that her last two INR's were 2.1 through [facility]. Pt reports that she has been continuing her warfarin maintenance dose of 2.5 mg on Mondays, Fridays and 5 mg all the other days. Pt reports that she plans to go to [place] tomorrow 11/6/20 and will get another INR. She states that she will call central INR with the next INR result for monitoring.    Pt states that a home care nurse comes to see her weekly and she has physical therapy weekly. Pt also reports that she was told to drink a boost every day which she has been doing.

## 2020-11-06 LAB — INR PPP: 1.9 (ref 0.9–1.1)

## 2020-11-09 ENCOUNTER — MEDICAL CORRESPONDENCE (OUTPATIENT)
Dept: HEALTH INFORMATION MANAGEMENT | Facility: CLINIC | Age: 80
End: 2020-11-09

## 2020-11-09 ENCOUNTER — TELEPHONE (OUTPATIENT)
Dept: FAMILY MEDICINE | Facility: CLINIC | Age: 80
End: 2020-11-09

## 2020-11-09 DIAGNOSIS — Z86.718 HISTORY OF DEEP VENOUS THROMBOSIS: ICD-10-CM

## 2020-11-09 DIAGNOSIS — I82.409 DVT (DEEP VENOUS THROMBOSIS) (H): ICD-10-CM

## 2020-11-09 DIAGNOSIS — D68.51 FACTOR V LEIDEN (H): ICD-10-CM

## 2020-11-09 NOTE — TELEPHONE ENCOUNTER
ANTICOAGULATION MANAGEMENT     Patient Name:  Susan Ferguson  Date:  2020    ASSESSMENT /SUBJECTIVE:    Friday's INR result of 1.9 is subtherapeutic. Goal INR of 2.0-3.0      Warfarin dose taken: Warfarin taken as instructed    Diet: Protein supplement/shake started which maybe affecting INR, patient was instructed to increase her protein amount per week by oncology. Patient has been doing one boost per day plus a protein powder    Medication changes/ interactions: No new medications/supplements affecting INR, had carboplatin and hylenex on Friday    Previous INR: Therapeutic 2.1    S/S of bleeding or thromboembolism: No    New injury or illness: No    Upcoming surgery, procedure or cardioversion: No    Additional findings: Has chemo weekly at [facility] on . [facility] would like her INR to remain <2.5 to allow for paracentesis if needed.      PLAN:    Telephone call with Susan regarding INR result and instructed:     Warfarin Dosing Instructions: Continue your current warfarin dose 2.5mg Mon/Fri; 5mg all other days    Instructed patient to follow up no later than: 1 week  Patient using outside facility for labs    Education provided: Importance of consistent vitamin K intake and Contact the anticoagulation clinic with any changes, questions or concerns at #[phone]       Susan verbalizes understanding and agrees to warfarin dosing plan.    Instructed to call the Anticoagulation Clinic for any changes, questions or concerns. (#[phone])        Shasta Mcconnell RN CACP       OBJECTIVE:  Recent labs: (last 7 days)     20   INR 1.9*             Anticoagulation Summary  As of 2020    INR goal:  2.0-3.0   TTR:  44.6 % (1 y)   INR used for dosin.9 (2020)   Warfarin maintenance plan:  2.5 mg (5 mg x 0.5) every Mon, Fri; 5 mg (5 mg x 1) all other days   Full warfarin instructions:  2.5 mg every Mon, Fri; 5 mg all other days   Weekly warfarin total:  30 mg   No change documented:   Shasta Mcconnell, RN   Plan last modified:  Mila Keith RN (9/21/2020)   Next INR check:  11/13/2020   Priority:  Critical   Target end date:  12/19/2020    Indications    DVT (deep venous thrombosis) (H) [I82.409]  Factor V Leiden (H) [D68.51]  History of deep venous thrombosis [Z86.718]             Anticoagulation Episode Summary     INR check location:      Preferred lab:      Send INR reminders to:  YARI WHITE    Comments:        Anticoagulation Care Providers     Provider Role Specialty Phone number    Jimi Wan MD Referring Internal Medicine [phone]

## 2020-11-13 ENCOUNTER — MEDICAL CORRESPONDENCE (OUTPATIENT)
Dept: HEALTH INFORMATION MANAGEMENT | Facility: CLINIC | Age: 80
End: 2020-11-13

## 2020-11-13 LAB
CREAT SERPL-MCNC: 0.51 MG/DL (ref 0.59–1.04)
GFR SERPL CREATININE-BSD FRML MDRD: >90 ML/MIN/BSA
INR PPP: 1.3 (ref 0.9–1.1)

## 2020-11-16 ENCOUNTER — MEDICAL CORRESPONDENCE (OUTPATIENT)
Dept: HEALTH INFORMATION MANAGEMENT | Facility: CLINIC | Age: 80
End: 2020-11-16

## 2020-11-17 ENCOUNTER — ANTICOAGULATION THERAPY VISIT (OUTPATIENT)
Dept: ANTICOAGULATION | Facility: CLINIC | Age: 80
End: 2020-11-17
Payer: COMMERCIAL

## 2020-11-17 DIAGNOSIS — Z86.718 HISTORY OF DEEP VENOUS THROMBOSIS: ICD-10-CM

## 2020-11-17 DIAGNOSIS — I82.409 DVT (DEEP VENOUS THROMBOSIS) (H): ICD-10-CM

## 2020-11-17 DIAGNOSIS — D68.51 FACTOR V LEIDEN (H): ICD-10-CM

## 2020-11-17 PROCEDURE — 99207 PR NO CHARGE NURSE ONLY: CPT | Performed by: INTERNAL MEDICINE

## 2020-11-17 NOTE — PROGRESS NOTES
ANTICOAGULATION FOLLOW-UP CLINIC VISIT    Patient Name:  Susan Ferguson  Date:  11/17/2020  Contact Type:  Telephone    SUBJECTIVE:  Patient Findings     Comments:  Pt has continued Boost once a day.  Pt called [facility] upset - she states that she does not like how at her last [facility] call she was instructed not to have a Boost each day. She states that she drinks them daily and she is doing chemo so she needs to continue drinking them. Education provided and advised pt to stay consistent with her Boost.   Pt also states that after her sub therapeutic INR on Friday - she has been taking 5 mg daily. Pt states that she prefers to continue 5 mg daily due to the drop in her INR. Pt has not made other changes in her routine.  No changes in medications, activity, or diet noted. No concerns with clotting, bleeding, or increased bruising noted.  Pt already took 5 mg on Friday 11/13 and Monday 11/16 - will not increase dose further at this time. Pt did have an INR drawn on 11/13 at [facility] - see Care Everywhere.  Pt is to continue with 5 mg daily and recheck INR on Friday.   Pt gave verbal okay to continue to discuss care with daughter Zenaida Ferguson who helps her with her care. She states that she is not home until after 6 PM on Friday so she will not be able to discuss with [facility] nurse herself.  Patient verbalizes understanding and agrees to plan. No further questions or concerns.         Clinical Outcomes     Negatives:  Major bleeding event, Thromboembolic event, Anticoagulation-related hospital admission, Anticoagulation-related ED visit, Anticoagulation-related fatality    Comments:  Pt has continued Boost once a day.  Pt called [facility] upset - she states that she does not like how at her last [facility] call she was instructed not to have a Boost each day. She states that she drinks them daily and she is doing chemo so she needs to continue drinking them. Education provided and advised pt to stay consistent with her Boost.   Pt  also states that after her sub therapeutic INR on Friday - she has been taking 5 mg daily. Pt states that she prefers to continue 5 mg daily due to the drop in her INR. Pt has not made other changes in her routine.  No changes in medications, activity, or diet noted. No concerns with clotting, bleeding, or increased bruising noted.  Pt already took 5 mg on  and  - will not increase dose further at this time. Pt did have an INR drawn on  at [facility] - see Care Everywhere.  Pt is to continue with 5 mg daily and recheck INR on Friday.   Pt gave verbal okay to continue to discuss care with daughter Zenaida Ferguson who helps her with her care. She states that she is not home until after 6 PM on Friday so she will not be able to discuss with [facility] nurse herself.  Patient verbalizes understanding and agrees to plan. No further questions or concerns.            OBJECTIVE    Recent labs: (last 7 days)     20   INR 1.3*       ASSESSMENT / PLAN  INR assessment SUB    Recheck INR In: 1 WEEK    INR Location Outside lab      Anticoagulation Summary  As of 2020    INR goal:  2.0-3.0   TTR:  43.6 % (1 y)   INR used for dosin.3 (2020)   Warfarin maintenance plan:  5 mg (5 mg x 1) every day   Full warfarin instructions:  5 mg every day   Weekly warfarin total:  35 mg   Plan last modified:  Barbi Jackson RN (2020)   Next INR check:  2020   Priority:  Critical   Target end date:  2020    Indications    DVT (deep venous thrombosis) (H) [I82.409]  Factor V Leiden (H) [D68.51]  History of deep venous thrombosis [Z86.718]             Anticoagulation Episode Summary     INR check location:      Preferred lab:      Send INR reminders to:  YARI WHITE    Comments:        Anticoagulation Care Providers     Provider Role Specialty Phone number    Jimi Wan MD Referring Internal Medicine [phone]            See the Encounter Report to view Anticoagulation  Flowsheet and Dosing Calendar (Go to Encounters tab in chart review, and find the Anticoagulation Therapy Visit)        Barbi Jackson RN

## 2020-11-20 ENCOUNTER — TRANSFERRED RECORDS (OUTPATIENT)
Dept: HEALTH INFORMATION MANAGEMENT | Facility: CLINIC | Age: 80
End: 2020-11-20

## 2020-11-20 LAB — INR PPP: 2 (ref 0.9–1.1)

## 2020-11-23 ENCOUNTER — ANTICOAGULATION THERAPY VISIT (OUTPATIENT)
Dept: ANTICOAGULATION | Facility: CLINIC | Age: 80
End: 2020-11-23
Payer: COMMERCIAL

## 2020-11-23 DIAGNOSIS — Z86.718 HISTORY OF DEEP VENOUS THROMBOSIS: ICD-10-CM

## 2020-11-23 DIAGNOSIS — D68.51 FACTOR V LEIDEN (H): ICD-10-CM

## 2020-11-23 DIAGNOSIS — I82.409 DVT (DEEP VENOUS THROMBOSIS) (H): ICD-10-CM

## 2020-11-23 PROCEDURE — 99207 PR NO CHARGE NURSE ONLY: CPT

## 2020-11-23 NOTE — PROGRESS NOTES
ANTICOAGULATION FOLLOW-UP CLINIC VISIT    Patient Name:  Susan Ferguson  Date:  2020  Contact Type:  Telephone    SUBJECTIVE:  Patient Findings     Comments:  The patient was assessed for diet, medication, and activity level changes, missed or extra doses, bruising or bleeding, with no problem findings.          Clinical Outcomes     Comments:  The patient was assessed for diet, medication, and activity level changes, missed or extra doses, bruising or bleeding, with no problem findings.             OBJECTIVE    Recent labs: (last 7 days)     20   INR 2.0*       ASSESSMENT / PLAN  INR assessment THER    Recheck INR In: 1 WEEK    INR Location Outside lab      Anticoagulation Summary  As of 2020    INR goal:  2.0-3.0   TTR:  43.5 % (1 y)   INR used for dosin.0 (2020)   Warfarin maintenance plan:  5 mg (5 mg x 1) every day   Full warfarin instructions:  5 mg every day   Weekly warfarin total:  35 mg   No change documented:  Eden Montez RN   Plan last modified:  Barbi Jackson RN (2020)   Next INR check:  2020   Priority:  Maintenance   Target end date:  2020    Indications    DVT (deep venous thrombosis) (H) [I82.409]  Factor V Leiden (H) [D68.51]  History of deep venous thrombosis [Z86.718]             Anticoagulation Episode Summary     INR check location:      Preferred lab:      Send INR reminders to:  YARI WHITE    Comments:        Anticoagulation Care Providers     Provider Role Specialty Phone number    Jimi Wan MD Referring Internal Medicine [phone]            See the Encounter Report to view Anticoagulation Flowsheet and Dosing Calendar (Go to Encounters tab in chart review, and find the Anticoagulation Therapy Visit)        Eden Montez RN               ANTICOAGULATION FOLLOW-UP CLINIC VISIT    Patient Name:  Susan Ferguson  Date:  2020  Contact Type:  Telephone    SUBJECTIVE:  Patient Findings     Comments:  The  patient was assessed for diet, medication, and activity level changes, missed or extra doses, bruising or bleeding,  On chemo.  Feels weak            Clinical Outcomes     Comments:  The patient was assessed for diet, medication, and activity level changes, missed or extra doses, bruising or bleeding,  On chemo.  Feels weak               OBJECTIVE    Recent labs: (last 7 days)     20   INR 2.0*       ASSESSMENT / PLAN  INR assessment THER    Recheck INR In: 1 WEEK    INR Location Outside lab      Anticoagulation Summary  As of 2020    INR goal:  2.0-3.0   TTR:  43.5 % (1 y)   INR used for dosin.0 (2020)   Warfarin maintenance plan:  5 mg (5 mg x 1) every day   Full warfarin instructions:  5 mg every day   Weekly warfarin total:  35 mg   No change documented:  Eden Mnotez RN   Plan last modified:  Barbi Jackson RN (2020)   Next INR check:  2020   Priority:  Maintenance   Target end date:  2020    Indications    DVT (deep venous thrombosis) (H) [I82.409]  Factor V Leiden (H) [D68.51]  History of deep venous thrombosis [Z86.718]             Anticoagulation Episode Summary     INR check location:      Preferred lab:      Send INR reminders to:  YARI WHITE    Comments:        Anticoagulation Care Providers     Provider Role Specialty Phone number    Jimi Wan MD Referring Internal Medicine [phone]            See the Encounter Report to view Anticoagulation Flowsheet and Dosing Calendar (Go to Encounters tab in chart review, and find the Anticoagulation Therapy Visit)        Eden Montez, RN

## 2020-12-29 ENCOUNTER — TRANSFERRED RECORDS (OUTPATIENT)
Dept: HEALTH INFORMATION MANAGEMENT | Facility: CLINIC | Age: 80
End: 2020-12-29

## 2021-01-01 ENCOUNTER — ANTICOAGULATION THERAPY VISIT (OUTPATIENT)
Dept: ANTICOAGULATION | Facility: CLINIC | Age: 81
End: 2021-01-01

## 2021-01-01 ENCOUNTER — MEDICAL CORRESPONDENCE (OUTPATIENT)
Dept: HEALTH INFORMATION MANAGEMENT | Facility: CLINIC | Age: 81
End: 2021-01-01
Payer: COMMERCIAL

## 2021-01-01 ENCOUNTER — LAB (OUTPATIENT)
Dept: LAB | Facility: CLINIC | Age: 81
End: 2021-01-01
Payer: COMMERCIAL

## 2021-01-01 DIAGNOSIS — D68.51 FACTOR V LEIDEN (H): ICD-10-CM

## 2021-01-01 DIAGNOSIS — Z86.718 HISTORY OF DEEP VENOUS THROMBOSIS: ICD-10-CM

## 2021-01-01 DIAGNOSIS — I82.409 DVT (DEEP VENOUS THROMBOSIS) (H): Primary | ICD-10-CM

## 2021-01-01 DIAGNOSIS — I82.409 DVT (DEEP VENOUS THROMBOSIS) (H): Chronic | ICD-10-CM

## 2021-01-01 LAB — INR BLD: 2.7 (ref 0.9–1.1)

## 2021-01-01 PROCEDURE — 36416 COLLJ CAPILLARY BLOOD SPEC: CPT

## 2021-01-01 PROCEDURE — 85610 PROTHROMBIN TIME: CPT

## 2021-01-08 ENCOUNTER — TRANSFERRED RECORDS (OUTPATIENT)
Dept: HEALTH INFORMATION MANAGEMENT | Facility: CLINIC | Age: 81
End: 2021-01-08

## 2021-01-14 LAB — INR PPP: 1.5 (ref 0.9–1.1)

## 2021-01-15 ENCOUNTER — HEALTH MAINTENANCE LETTER (OUTPATIENT)
Age: 81
End: 2021-01-15

## 2021-01-19 LAB — INR PPP: 3.4 (ref 0.9–1.1)

## 2021-01-26 LAB — INR PPP: 1.2 (ref 0.9–1.1)

## 2021-02-02 ENCOUNTER — TRANSFERRED RECORDS (OUTPATIENT)
Dept: HEALTH INFORMATION MANAGEMENT | Facility: CLINIC | Age: 81
End: 2021-02-02

## 2021-02-02 LAB — INR PPP: 3.2 (ref 0.9–1.1)

## 2021-02-09 LAB — INR PPP: 1.2 (ref 0.9–1.1)

## 2021-02-10 ENCOUNTER — TELEPHONE (OUTPATIENT)
Dept: FAMILY MEDICINE | Facility: CLINIC | Age: 81
End: 2021-02-10

## 2021-02-10 ENCOUNTER — DOCUMENTATION ONLY (OUTPATIENT)
Dept: FAMILY MEDICINE | Facility: CLINIC | Age: 81
End: 2021-02-10
Payer: COMMERCIAL

## 2021-02-10 DIAGNOSIS — I82.409 DVT (DEEP VENOUS THROMBOSIS) (H): Primary | Chronic | ICD-10-CM

## 2021-02-10 DIAGNOSIS — Z86.718 HISTORY OF DEEP VENOUS THROMBOSIS: ICD-10-CM

## 2021-02-10 NOTE — TELEPHONE ENCOUNTER
ANTICOAGULATION MANAGEMENT      Susanserafin Ferguson due for annual renewal of referral to anticoagulation monitoring. Order pended for your review and signature.      ANTICOAGULATION SUMMARY      Warfarin indication(s)     DVT    Heart valve present?  NO       Current goal range   INR: 2.0-3.0     Goal appropriate for indication? Yes, INR 2-3 appropriate for hx of DVT, PE, hypercoagulable state, Afib, LVAD, or bileaflet AVR without risk factors     Current duration of therapy Indefinite/long term therapy   Time in Therapeutic Range (TTR)  (Goal > 60%) 43 %       Office visit with referring provider's group within last year yes on 20       Patient will need new INR referral, has recently .  Can MD please sign until patient can restablish care with a new physician due to current physician retiring?.  Her next INR is scheduled for 21. Patient reports that she is currently taking 5 mg daily.        April Blair RN

## 2021-02-18 ENCOUNTER — ANTICOAGULATION THERAPY VISIT (OUTPATIENT)
Dept: NURSING | Facility: CLINIC | Age: 81
End: 2021-02-18

## 2021-02-18 DIAGNOSIS — Z86.718 HISTORY OF DEEP VENOUS THROMBOSIS: ICD-10-CM

## 2021-02-18 LAB
CAPILLARY BLOOD COLLECTION: NORMAL
INR PPP: 1.7 (ref 0.86–1.14)

## 2021-02-18 PROCEDURE — 85610 PROTHROMBIN TIME: CPT | Performed by: INTERNAL MEDICINE

## 2021-02-18 PROCEDURE — 99207 PR NO CHARGE NURSE ONLY: CPT

## 2021-02-18 PROCEDURE — 36416 COLLJ CAPILLARY BLOOD SPEC: CPT | Performed by: INTERNAL MEDICINE

## 2021-02-18 NOTE — PROGRESS NOTES
Anticoagulation Management    Unable to reach Susan today.    Today's INR result of 1.7 is subtherapeutic (goal INR of 2.0-3.0).  Result received from: Clinic Lab    Follow up required to discuss out of range INR     Left message to take a booster dose of warfarin,  7.5 mg tonight.     Transfer to [phone]      Anticoagulation clinic to follow up    Mila Keith RN  ANTICOAGULATION FOLLOW-UP CLINIC VISIT    Patient Name:  Susan Ferguson  Date:  2/18/2021  Contact Type:  Telephone/ Patient    SUBJECTIVE:  Patient Findings     Positives:  Missed doses, Other complaints    Comments:  Pt reports that she held warfarin doses in order to have the second dermatological procedure on her nose for cancer treatment. The first procedure did not go well because she was having so much bleeding.Pt reports that her daughter has to change the bandage daily. Also, pt diagnosed with ovarian cancer and has been having weekly chemo at [facility]. Pt has stopped chemo for now to let the wound on her nose heal and to help get her strength for a possible hysterectomy. Pt plans to discuss and schedule surgery with the surgeon on 3/16/21.        Clinical Outcomes     Comments:  Pt reports that she held warfarin doses in order to have the second dermatological procedure on her nose for cancer treatment. The first procedure did not go well because she was having so much bleeding.Pt reports that her daughter has to change the bandage daily. Also, pt diagnosed with ovarian cancer and has been having weekly chemo at [facility]. Pt has stopped chemo for now to let the wound on her nose heal and to help get her strength for a possible hysterectomy. Pt plans to discuss and schedule surgery with the surgeon on 3/16/21.           OBJECTIVE    Recent labs: (last 7 days)     02/18/21  1054   INR 1.70*       ASSESSMENT / PLAN  INR assessment SUB    Recheck INR In: 1 WEEK    INR Location Outside lab      Anticoagulation Summary  As of 2/18/2021    INR  goal:  2.0-3.0   TTR:  31.5 % (1 y)   INR used for dosin.2 (2021)   Warfarin maintenance plan:  5 mg (5 mg x 1) every day   Full warfarin instructions:  : 7.5 mg; Otherwise 5 mg every day   Weekly warfarin total:  35 mg   Plan last modified:  Barbi Jackson RN (2020)   Next INR check:  2021   Priority:  Maintenance   Target end date:  2020    Indications    DVT (deep venous thrombosis) (H) [I82.409]  Factor V Leiden (H) [D68.51]  History of deep venous thrombosis [Z86.718]             Anticoagulation Episode Summary     INR check location:      Preferred lab:      Send INR reminders to:  YARI WHITE    Comments:        Anticoagulation Care Providers     Provider Role Specialty Phone number    Jimi Wan MD Referring Internal Medicine [phone]    Chaka Martinez MD Referring Internal Medicine [phone]            See the Encounter Report to view Anticoagulation Flowsheet and Dosing Calendar (Go to Encounters tab in chart review, and find the Anticoagulation Therapy Visit)    Pt INR is 1.7 today. See findings. Consulted with Alicia García pharmacist. Pt advised to take 7.5 mg of warfarin today 21 then continue taking 5 mg of warfarin daily. Recheck the INR in 1 week scheduled on 21 in [place]. Susan aware if signs of clotting (pain, tenderness, swelling, color change in leg or arm, SOB) and bleeding occur (blood in stool, urine, large bruising, bleeding gums, nosebleeds) to have INR check sooner. If sx severe report to ER or concerned for stroke call 911. If general questions or concerns arise, call clinic.         Mila Keith RN

## 2021-02-18 NOTE — PROGRESS NOTES
On Carboplatin & Paclitaxel cycle 1 02/09/2021, cycle 2 02/16/2021    Recommend 7.5mg today bump, may see additional elevation related to chemo run 02/16, OK to recheck at [facility], but should have INR in ~1 week.      Alicia García, PharmD BCACP  Anticoagulation Clinical Pharmacist

## 2021-02-25 ENCOUNTER — ANTICOAGULATION THERAPY VISIT (OUTPATIENT)
Dept: ANTICOAGULATION | Facility: CLINIC | Age: 81
End: 2021-02-25

## 2021-02-25 DIAGNOSIS — I82.409 DVT (DEEP VENOUS THROMBOSIS) (H): ICD-10-CM

## 2021-02-25 DIAGNOSIS — Z86.718 HISTORY OF DEEP VENOUS THROMBOSIS: ICD-10-CM

## 2021-02-25 DIAGNOSIS — D68.51 FACTOR V LEIDEN (H): ICD-10-CM

## 2021-02-25 LAB
CAPILLARY BLOOD COLLECTION: NORMAL
INR PPP: 1.8 (ref 0.86–1.14)

## 2021-02-25 PROCEDURE — 36416 COLLJ CAPILLARY BLOOD SPEC: CPT | Performed by: INTERNAL MEDICINE

## 2021-02-25 PROCEDURE — 99207 PR NO CHARGE NURSE ONLY: CPT

## 2021-02-25 PROCEDURE — 85610 PROTHROMBIN TIME: CPT | Performed by: INTERNAL MEDICINE

## 2021-02-25 NOTE — PROGRESS NOTES
ANTICOAGULATION FOLLOW-UP CLINIC VISIT    Patient Name:  Susan Ferguson  Date:  2021  Contact Type:  Telephone    SUBJECTIVE:  Patient Findings     Comments:  Signs/symptoms of thrombosis - .nop  The patient was assessed for diet, medication, and activity level changes, missed or extra doses, bruising or bleeding, with no problem findings.          Clinical Outcomes     Comments:  The patient was assessed for diet, medication, and activity level changes, missed or extra doses, bruising or bleeding, with no problem findings.             OBJECTIVE    Recent labs: (last 7 days)     21  1109   INR 1.80*       ASSESSMENT / PLAN  INR assessment THER    Recheck INR In: 2 WEEKS    INR Location Clinic      Anticoagulation Summary  As of 2021    INR goal:  2.0-3.0   TTR:  29.6 % (1 y)   INR used for dosin.80 (2021)   Warfarin maintenance plan:  5 mg (5 mg x 1) every day   Full warfarin instructions:  : 7.5 mg; 3/1: 7.5 mg; 3/: 7.5 mg; 3/8: 7.5 mg; Otherwise 5 mg every day   Weekly warfarin total:  35 mg   Plan last modified:  Barbi Jackson RN (2020)   Next INR check:  3/11/2021   Priority:  Maintenance   Target end date:  2020    Indications    DVT (deep venous thrombosis) (H) [I82.409]  Factor V Leiden (H) [D68.51]  History of deep venous thrombosis [Z86.718]             Anticoagulation Episode Summary     INR check location:      Preferred lab:      Send INR reminders to:  YARI WHITE    Comments:        Anticoagulation Care Providers     Provider Role Specialty Phone number    Jimi Wan MD Referring Internal Medicine [phone]    Chaka Martinez MD Referring Internal Medicine [phone]            See the Encounter Report to view Anticoagulation Flowsheet and Dosing Calendar (Go to Encounters tab in chart review, and find the Anticoagulation Therapy Visit)        Eden Montez, RN

## 2021-03-01 ENCOUNTER — TELEPHONE (OUTPATIENT)
Dept: FAMILY MEDICINE | Facility: CLINIC | Age: 81
End: 2021-03-01

## 2021-03-11 ENCOUNTER — ANTICOAGULATION THERAPY VISIT (OUTPATIENT)
Dept: NURSING | Facility: CLINIC | Age: 81
End: 2021-03-11

## 2021-03-11 DIAGNOSIS — Z86.718 HISTORY OF DEEP VENOUS THROMBOSIS: ICD-10-CM

## 2021-03-11 DIAGNOSIS — I82.409 DVT (DEEP VENOUS THROMBOSIS) (H): ICD-10-CM

## 2021-03-11 DIAGNOSIS — D68.51 FACTOR V LEIDEN (H): ICD-10-CM

## 2021-03-11 LAB
CAPILLARY BLOOD COLLECTION: NORMAL
INR PPP: 2.6 (ref 0.86–1.14)

## 2021-03-11 PROCEDURE — 36416 COLLJ CAPILLARY BLOOD SPEC: CPT | Performed by: INTERNAL MEDICINE

## 2021-03-11 PROCEDURE — 85610 PROTHROMBIN TIME: CPT | Performed by: INTERNAL MEDICINE

## 2021-03-11 NOTE — PROGRESS NOTES
ANTICOAGULATION MANAGEMENT     Patient Name:  Susan Ferguson  Date:  3/11/2021    ASSESSMENT /SUBJECTIVE:    Today's INR result of 2.6 is therapeutic. Goal INR of 2.0-3.0      Warfarin dose taken: Warfarin taken as instructed    Diet: No new diet changes affecting INR    Medication changes/ interactions: No new medications/supplements affecting INR    Previous INR: Subtherapeutic     S/S of bleeding or thromboembolism: No    New injury or illness: No    Upcoming surgery, procedure or cardioversion: No    Additional findings: states that she will be back down at [facility] on 3/24 and they check her INR at that appointment.  Patient will call [facility] to update on the results.  Can also check Care Everywhere.           PLAN:    Telephone call with Susan regarding INR result and instructed:     Warfarin Dosing Instructions: Continue your current warfarin dose 7.5mg MF and 5mg AOD    Instructed patient to follow up no later than: 2 weeks  Lab visit scheduled    Education provided: None required      Susan verbalizes understanding and agrees to warfarin dosing plan.    Instructed to call the Anticoagulation Clinic for any changes, questions or concerns. (#[phone])        April Blair RN      OBJECTIVE:  Recent labs: (last 7 days)     03/11/21  0911   INR 2.60*         INR assessment THER    Recheck INR In: 2 WEEKS    INR Location Clinic      Anticoagulation Summary  As of 3/11/2021    INR goal:  2.0-3.0   TTR:  32.2 % (1 y)   INR used for dosing:  No new INR was available at the time of this encounter.   Warfarin maintenance plan:  7.5 mg (5 mg x 1.5) every Mon, Fri; 5 mg (5 mg x 1) all other days   Full warfarin instructions:  7.5 mg every Mon, Fri; 5 mg all other days   Weekly warfarin total:  40 mg   Plan last modified:  April Blair RN (3/11/2021)   Next INR check:  3/25/2021   Priority:  Maintenance   Target end date:  12/19/2020    Indications    DVT (deep venous thrombosis) (H) [I82.409]  Factor V  Leiden (H) [D68.51]  History of deep venous thrombosis [Z86.718]             Anticoagulation Episode Summary     INR check location:      Preferred lab:      Send INR reminders to:  YARI WHITE    Comments:        Anticoagulation Care Providers     Provider Role Specialty Phone number    Jimi Wan MD Referring Internal Medicine [phone]    Chaka Martinez MD Referring Internal Medicine [phone]

## 2021-03-21 ENCOUNTER — TRANSFERRED RECORDS (OUTPATIENT)
Dept: HEALTH INFORMATION MANAGEMENT | Facility: CLINIC | Age: 81
End: 2021-03-21

## 2021-03-24 ENCOUNTER — TELEPHONE (OUTPATIENT)
Dept: FAMILY MEDICINE | Facility: CLINIC | Age: 81
End: 2021-03-24

## 2021-03-24 DIAGNOSIS — D68.51 FACTOR V LEIDEN (H): ICD-10-CM

## 2021-03-24 DIAGNOSIS — I82.409 DVT (DEEP VENOUS THROMBOSIS) (H): ICD-10-CM

## 2021-03-24 DIAGNOSIS — Z86.718 HISTORY OF DEEP VENOUS THROMBOSIS: ICD-10-CM

## 2021-03-24 NOTE — TELEPHONE ENCOUNTER
According to last anticoagulation encounter date 3/11/21, pt may have gotten an INR done at [facility] today 3/24/21. However, it looks like Susan had a virtual visit with [facility] today and therefore no labs done. Called to discuss with pt. Left a voicemail requesting a call back. May need to schedule pt for a lab appointment.    Transfer to [phone]

## 2021-03-24 NOTE — TELEPHONE ENCOUNTER
Pt called back to report that she had a virtual visit today with [facility]. Pt will start on Monday 3/29/21 to go to [facility] every week for chemo. Pt states that she will be at [facility] on Mondays from 8 am-5 pm. Pt does not want to be called on Mondays. Pt states that an INR will be drawn every week at [facility]. Pt does not want a call from the [facility] every week regarding her INR. Pt reports that she wants to hear from us every 2 weeks on the second and 4th Tuesday of the month after 4 pm. Pt advised that INR monitoring is important since she will be on chemo.Pt reports my INR was good on 3/11/21. I shouldn't have to talk to you until around 4/6/21.     Will route to Alicia García pharmacist for review

## 2021-03-24 NOTE — TELEPHONE ENCOUNTER
Care Everywhere should be checked Mondays & anticoag encounter started.  If in range OK & dose will be same, OK to not call weekly, and check in every 2 weeks as requested, and not until Tuesday (assume patient will be traveling home Monday PMs).    If INR is out of range/dose adjustment needed for that evening we will chung to call and at least give instruction for that evening dose & follow up with more complete instructions if needed next day.    Alicia García, PharmD BCACP  Anticoagulation Clinical Pharmacist

## 2021-03-29 ENCOUNTER — TRANSFERRED RECORDS (OUTPATIENT)
Dept: HEALTH INFORMATION MANAGEMENT | Facility: CLINIC | Age: 81
End: 2021-03-29

## 2021-03-29 LAB — INR PPP: 2.9 (ref 0.9–1.1)

## 2021-03-29 NOTE — TELEPHONE ENCOUNTER
ANTICOAGULATION  MANAGEMENT-Patient Home Monitoring Result    Assessment     Therapeutic INR result of 2.9 . Goal range 2.0-3.0. Received via Care Everywhere through [facility]     Previous INR was therapeutic    Susan was last contacted by phone: 3/24    Plan     patient was NOT contacted regarding therapeutic result today.  Patient is to continue current dose and continue to check INR every week at Chemo appointment.  See note from [facility].  Staff will contact patient every 2 weeks unless INR is sub/supra therapeutic.  Patient requests to be called on Tues if lab is therapeutic.    ?       OBJECTIVE    INR   Date Value Ref Range Status   2021 2.9 (A) 0.90 - 1.10 Final     Factor 2 Assay   Date Value Ref Range Status   2009 19 (L) 60 - 140 % Final       ASSESSMENT / PLAN      Anticoagulation Summary  As of 3/24/2021    INR goal:  2.0-3.0   TTR:  --   INR used for dosin.9 (3/29/2021)   Warfarin maintenance plan:  7.5 mg (5 mg x 1.5) every Mon, Fri; 5 mg (5 mg x 1) all other days   Full warfarin instructions:  7.5 mg every Mon, Fri; 5 mg all other days   Weekly warfarin total:  40 mg   Plan last modified:  April Blair, RN (3/11/2021)   Next INR check:     Priority:  Maintenance   Target end date:  2020    Indications    DVT (deep venous thrombosis) (H) [I82.409]  Factor V Leiden (H) [D68.51]  History of deep venous thrombosis [Z86.718]             Anticoagulation Episode Summary     INR check location:      Preferred lab:      Send INR reminders to:  YARI WHITE    Comments:        Anticoagulation Care Providers     Provider Role Specialty Phone number    Jimi Wan MD Referring Internal Medicine [phone]    Chaka Martinez MD Referring Internal Medicine [phone]

## 2021-03-30 NOTE — TELEPHONE ENCOUNTER
Patient called INR clinic today and states that she missed her dose last evening.  Did advise to continue with current dosing and will reevaluate INR next week when she has it checked on Monday,4/5.  Did speak with patient and discussed dosing, that we will contact her on Monday evening/Tues morning for dose adjustment if needed weekly, otherwise if INR is in range, will contact patient every 2 weeks.  Pt is in agreement.    April Blair RN  [facility] Anticoagulation Clinic

## 2021-04-16 ENCOUNTER — ANTICOAGULATION THERAPY VISIT (OUTPATIENT)
Dept: FAMILY MEDICINE | Facility: CLINIC | Age: 81
End: 2021-04-16

## 2021-04-19 ENCOUNTER — TRANSFERRED RECORDS (OUTPATIENT)
Dept: HEALTH INFORMATION MANAGEMENT | Facility: CLINIC | Age: 81
End: 2021-04-19

## 2021-04-20 ENCOUNTER — ANTICOAGULATION THERAPY VISIT (OUTPATIENT)
Dept: FAMILY MEDICINE | Facility: CLINIC | Age: 81
End: 2021-04-20

## 2021-04-20 DIAGNOSIS — Z86.718 HISTORY OF DEEP VENOUS THROMBOSIS: ICD-10-CM

## 2021-04-20 DIAGNOSIS — I82.409 DVT (DEEP VENOUS THROMBOSIS) (H): ICD-10-CM

## 2021-04-20 DIAGNOSIS — I82.401 ACUTE DEEP VEIN THROMBOSIS (DVT) OF RIGHT LOWER EXTREMITY, UNSPECIFIED VEIN (H): ICD-10-CM

## 2021-04-20 DIAGNOSIS — D68.51 FACTOR V LEIDEN (H): ICD-10-CM

## 2021-04-20 LAB — INR PPP: 1.9 (ref 0.9–1.1)

## 2021-04-20 RX ORDER — WARFARIN SODIUM 5 MG/1
TABLET ORAL
Qty: 120 TABLET | Refills: 0 | Status: SHIPPED | OUTPATIENT
Start: 2021-04-20 | End: 2021-07-30

## 2021-04-20 NOTE — PROGRESS NOTES
ANTICOAGULATION MANAGEMENT     Patient Name:  Susan Ferguson  Date:  2021    ASSESSMENT /SUBJECTIVE:    Today's INR result of 1.9 is subtherapeutic. Goal INR of 2.0-3.0      Warfarin dose taken: Warfarin taken differently, but did not change total weekly dose    Diet: Increased greens/vitamin K in diet; plans to resume previous intake    Medication changes/ interactions: No new medications/supplements affecting INR    Previous INR: Therapeutic     S/S of bleeding or thromboembolism: No    New injury or illness: No    Upcoming surgery, procedure or cardioversion: No    Additional findings: None      PLAN:    Telephone call with Susan regarding INR result and instructed:     Warfarin Dosing Instructions: 7.5 mg tonight then continue your current warfarin dose of 7.5 mg on monday/fri and 5 mg all other days    Instructed patient to follow up no later than: 1 week  Patient using outside facility for labs    Education provided: None required      Susan verbalizes understanding and agrees to warfarin dosing plan.    Instructed to call the Anticoagulation Clinic for any changes, questions or concerns. (#[phone])        Seda Sanchez RN      OBJECTIVE:  Recent labs: (last 7 days)     21   INR 1.9*         No question data found.  Anticoagulation Summary  As of 2021    INR goal:  2.0-3.0   TTR:  41.4 % (1 y)   INR used for dosin.9 (2021)   Warfarin maintenance plan:  7.5 mg (5 mg x 1.5) every Mon, Fri; 5 mg (5 mg x 1) all other days   Full warfarin instructions:  7.5 mg every Mon, Fri; 5 mg all other days   Weekly warfarin total:  40 mg   Plan last modified:  April Blair RN (3/11/2021)   Next INR check:  2021   Priority:  Maintenance   Target end date:  2020    Indications    DVT (deep venous thrombosis) (H) [I82.409]  Factor V Leiden (H) [D68.51]  History of deep venous thrombosis [Z86.718]             Anticoagulation Episode Summary     INR check location:       Preferred lab:      Send INR reminders to:  YARI WHITE    Comments:        Anticoagulation Care Providers     Provider Role Specialty Phone number    Jimi Wan MD Referring Internal Medicine [phone]    Chaka Martinez MD Referring Internal Medicine [phone]

## 2021-04-20 NOTE — PROGRESS NOTES
Patient left a VM stating she had INR done at [facility] and it was 1.9.  She will be on a conference call from 8:45 - 11:00 so please call before or after.  Hortencia Nava, RN  Anticoagulation Nurse - [facility], [place]

## 2021-05-03 LAB — INR PPP: 2.5 (ref 0.9–1.1)

## 2021-05-04 ENCOUNTER — TRANSFERRED RECORDS (OUTPATIENT)
Dept: HEALTH INFORMATION MANAGEMENT | Facility: CLINIC | Age: 81
End: 2021-05-04

## 2021-05-07 ENCOUNTER — ANTICOAGULATION THERAPY VISIT (OUTPATIENT)
Dept: FAMILY MEDICINE | Facility: CLINIC | Age: 81
End: 2021-05-07

## 2021-05-07 DIAGNOSIS — D68.51 FACTOR V LEIDEN (H): ICD-10-CM

## 2021-05-07 DIAGNOSIS — Z86.718 HISTORY OF DEEP VENOUS THROMBOSIS: ICD-10-CM

## 2021-05-07 DIAGNOSIS — I82.401 ACUTE DEEP VEIN THROMBOSIS (DVT) OF RIGHT LOWER EXTREMITY, UNSPECIFIED VEIN (H): ICD-10-CM

## 2021-05-07 NOTE — PROGRESS NOTES
Pt on INR overdue reminder list.   Upon review, she had an INR done at [facility] on 5/3 (2.5 per care everywhere).    Called patient, a VM was left to return my call to discuss results and dosing.

## 2021-05-07 NOTE — PROGRESS NOTES
"ANTICOAGULATION MANAGEMENT     Patient Name:  Susan Ferguson  Date:  5/7/2021    ASSESSMENT /SUBJECTIVE:    Monday's INR result of 2.5 is therapeutic. Goal INR of 2.0-3.0      Warfarin dose taken: Warfarin taken as instructed    Diet: No new diet changes affecting INR    Medication changes/ interactions: No new medications/supplements affecting INR    Previous INR: Susan reporting previous week's INR at [facility] was in range (confirmed as 2.6 per care everywhere on 4/26)    S/S of bleeding or thromboembolism: No    New injury or illness: No    Upcoming surgery, procedure or cardioversion: No    Additional findings: Susan expressed irritation that [facility] called her today about INR results, stating \"I don't know why you call me when it is in range all the time I've been doing this for so long I know what to do when it is too high or too low I take an extra half a tablet or something\".       PLAN:    Telephone call with Susan regarding INR result and instructed:     Warfarin Dosing Instructions: Continue your current warfarin dose      7.5 mg every Mon, Fri; 5 mg all other days         Instructed patient to follow up no later than: 2 weeks  Patient using outside facility for labs    Education provided: Target INR goal and significance of current INR result. Supportive listening provided, advised patient that we are not trying to annoy her but as PCP is prescribing her warfarin we are required to be monitoring her INR results and contacting her regularly to ensure her INRs are in range and assessing for changes or concerns.       Susan verbalizes understanding and agrees to warfarin dosing plan.    Instructed to call the Anticoagulation Clinic for any changes, questions or concerns. (#[phone])        Carri Tran RN      OBJECTIVE:  Recent labs: (last 7 days)     05/03/21   INR 2.5*         No question data found.  Anticoagulation Summary  As of 5/7/2021    INR goal:  2.0-3.0   TTR:  40.2 % (1 y)   INR used for " dosin.5 (5/3/2021)   Warfarin maintenance plan:  7.5 mg (5 mg x 1.5) every Mon, Fri; 5 mg (5 mg x 1) all other days   Full warfarin instructions:  7.5 mg every Mon, Fri; 5 mg all other days   Weekly warfarin total:  40 mg   Plan last modified:  April Blair RN (3/11/2021)   Next INR check:  2021   Priority:  Maintenance   Target end date:  2020    Indications    DVT (deep venous thrombosis) (H) [I82.409]  Factor V Leiden (H) [D68.51]  History of deep venous thrombosis [Z86.718]             Anticoagulation Episode Summary     INR check location:      Preferred lab:      Send INR reminders to:  YARI WHITE    Comments:        Anticoagulation Care Providers     Provider Role Specialty Phone number    Jimi Wan MD Referring Internal Medicine [phone]    Chaka Martinez MD Referring Internal Medicine [phone]

## 2021-05-10 ENCOUNTER — TRANSFERRED RECORDS (OUTPATIENT)
Dept: HEALTH INFORMATION MANAGEMENT | Facility: CLINIC | Age: 81
End: 2021-05-10

## 2021-05-25 ENCOUNTER — ANTICOAGULATION THERAPY VISIT (OUTPATIENT)
Dept: FAMILY MEDICINE | Facility: CLINIC | Age: 81
End: 2021-05-25

## 2021-05-25 DIAGNOSIS — D68.51 FACTOR V LEIDEN (H): ICD-10-CM

## 2021-05-25 DIAGNOSIS — Z86.718 HISTORY OF DEEP VENOUS THROMBOSIS: ICD-10-CM

## 2021-05-25 DIAGNOSIS — I82.409 DVT (DEEP VENOUS THROMBOSIS) (H): ICD-10-CM

## 2021-05-25 LAB — INR PPP: 2.9 (ref 0.9–1.1)

## 2021-05-25 NOTE — PROGRESS NOTES
ANTICOAGULATION MANAGEMENT     Patient Name:  Susan Ferguson  Date:  2021    ASSESSMENT /SUBJECTIVE:    Today's INR result of 2.9 is therapeutic. Goal INR of 2.0-3.0      Warfarin dose taken: Patient had INR done at [facility]; decided that her INR was too high so held her 21 dose.    Diet: Decreased greens/vitamin K in diet; plans to resume previous intake    Medication changes/ interactions: No new medications/supplements affecting INR    Previous INR: Therapeutic     S/S of bleeding or thromboembolism: No    New injury or illness: No    Upcoming surgery, procedure or cardioversion: No    Additional findings: Finished chemo at [facility] yesterday.      PLAN:    Telephone call with Susan regarding INR result and instructed:     Warfarin Dosing Instructions: Continue your current warfarin dose 7.5 mg MF; 5 mg ROW    Instructed patient to follow up no later than: 6/3/21    Lab visit scheduled    Education provided: None required      Susan verbalizes understanding and agrees to warfarin dosing plan.    Instructed to call the Anticoagulation Clinic for any changes, questions or concerns. (#[phone])        Meghna Lees RN      OBJECTIVE:  Recent labs: (last 7 days)     21   INR 2.9*         No question data found.  Anticoagulation Summary  As of 2021    INR goal:  2.0-3.0   TTR:  40.9 % (1 y)   INR used for dosin.9 (2021)   Warfarin maintenance plan:  7.5 mg (5 mg x 1.5) every Mon, Fri; 5 mg (5 mg x 1) all other days   Full warfarin instructions:  7.5 mg every Mon, Fri; 5 mg all other days   Weekly warfarin total:  40 mg   No change documented:  Sigrid Cannon RN   Plan last modified:  April Blair RN (3/11/2021)   Next INR check:  6/3/2021   Priority:  Maintenance   Target end date:  2020    Indications    DVT (deep venous thrombosis) (H) [I82.409]  Factor V Leiden (H) [D68.51]  History of deep venous thrombosis [Z86.718]             Anticoagulation Episode  Summary     INR check location:      Preferred lab:      Send INR reminders to:  YARI WHITE    Comments:        Anticoagulation Care Providers     Provider Role Specialty Phone number    Jimi Wan MD Referring Internal Medicine [phone]    Chaka Martinez MD Referring Internal Medicine [phone]

## 2021-06-03 ENCOUNTER — ANTICOAGULATION THERAPY VISIT (OUTPATIENT)
Dept: ANTICOAGULATION | Facility: CLINIC | Age: 81
End: 2021-06-03

## 2021-06-03 DIAGNOSIS — D68.51 FACTOR V LEIDEN (H): ICD-10-CM

## 2021-06-03 DIAGNOSIS — Z86.718 HISTORY OF DEEP VENOUS THROMBOSIS: ICD-10-CM

## 2021-06-03 DIAGNOSIS — I82.409 DVT (DEEP VENOUS THROMBOSIS) (H): ICD-10-CM

## 2021-06-03 LAB
CAPILLARY BLOOD COLLECTION: NORMAL
INR PPP: 1.4 (ref 0.86–1.14)

## 2021-06-03 PROCEDURE — 36416 COLLJ CAPILLARY BLOOD SPEC: CPT | Performed by: INTERNAL MEDICINE

## 2021-06-03 PROCEDURE — 85610 PROTHROMBIN TIME: CPT | Performed by: INTERNAL MEDICINE

## 2021-06-03 NOTE — PROGRESS NOTES
ANTICOAGULATION FOLLOW-UP CLINIC VISIT    Patient Name:  Susan Ferguson  Date:  6/3/2021  Contact Type:  Telephone    SUBJECTIVE:  Patient Findings     Comments:  The patient was assessed for diet, medication, and activity level changes, missed or extra doses, bruising or bleeding, with no problem findings.  Dropped to 5 mg everyday last week and had extra greens        Clinical Outcomes     Comments:  The patient was assessed for diet, medication, and activity level changes, missed or extra doses, bruising or bleeding, with no problem findings.  Dropped to 5 mg everyday last week and had extra greens           OBJECTIVE    Recent labs: (last 7 days)     21  1049   INR 1.40*       ASSESSMENT / PLAN  INR assessment SUB    Recheck INR In: 1 WEEK    INR Location Clinic      Anticoagulation Summary  As of 6/3/2021    INR goal:  2.0-3.0   TTR:  41.8 % (1 y)   INR used for dosin.40 (6/3/2021)   Warfarin maintenance plan:  7.5 mg (5 mg x 1.5) every Mon, Fri; 5 mg (5 mg x 1) all other days   Full warfarin instructions:  6/3: 7.5 mg; Otherwise 7.5 mg every Mon, Fri; 5 mg all other days   Weekly warfarin total:  40 mg   Plan last modified:  April Blair RN (3/11/2021)   Next INR check:  6/10/2021   Priority:  High   Target end date:  2020    Indications    DVT (deep venous thrombosis) (H) [I82.409]  Factor V Leiden (H) [D68.51]  History of deep venous thrombosis [Z86.718]             Anticoagulation Episode Summary     INR check location:      Preferred lab:      Send INR reminders to:  YARI WHITE    Comments:        Anticoagulation Care Providers     Provider Role Specialty Phone number    Jimi Wan MD Referring Internal Medicine [phone]    Chaka Martinez MD Referring Internal Medicine [phone]            See the Encounter Report to view Anticoagulation Flowsheet and Dosing Calendar (Go to Encounters tab in chart review, and find the Anticoagulation Therapy  Visit)        Eden Montez RN

## 2021-06-10 ENCOUNTER — ANTICOAGULATION THERAPY VISIT (OUTPATIENT)
Dept: NURSING | Facility: CLINIC | Age: 81
End: 2021-06-10

## 2021-06-10 DIAGNOSIS — Z86.718 HISTORY OF DEEP VENOUS THROMBOSIS: ICD-10-CM

## 2021-06-10 DIAGNOSIS — D68.51 FACTOR V LEIDEN (H): ICD-10-CM

## 2021-06-10 DIAGNOSIS — I82.409 DVT (DEEP VENOUS THROMBOSIS) (H): ICD-10-CM

## 2021-06-10 LAB
CAPILLARY BLOOD COLLECTION: NORMAL
INR PPP: 2.4 (ref 0.86–1.14)

## 2021-06-10 PROCEDURE — 36416 COLLJ CAPILLARY BLOOD SPEC: CPT | Performed by: INTERNAL MEDICINE

## 2021-06-10 PROCEDURE — 85610 PROTHROMBIN TIME: CPT | Performed by: INTERNAL MEDICINE

## 2021-06-10 NOTE — PROGRESS NOTES
ANTICOAGULATION MANAGEMENT     Patient Name:  Susan Ferguson  Date:  6/10/2021    ASSESSMENT /SUBJECTIVE:    Today's INR result of 2.4 is therapeutic. Goal INR of 2.0-3.0      Warfarin dose taken: Warfarin taken as instructed    Diet: No new diet changes affecting INR    Medication changes/ interactions: No new medications/supplements affecting INR    Previous INR: Subtherapeutic     S/S of bleeding or thromboembolism: No    New injury or illness: No    Upcoming surgery, procedure or cardioversion: No    Additional findings: patient states that she is going to [facility] on 6/17 for labs and they may do an INR, will check care everywhere otherwise, patient will come in to [place] in 2 weeks for an INR       PLAN:    Warfarin Dosing Instructions: Continue your current warfarin dose 7.5mg MF and 5mg AOD    Instructed patient to follow up no later than: 1 week  Patient using outside facility for labs    Education provided: Monitoring for bleeding signs and symptoms and Monitoring for clotting signs and symptoms    Telephone call with Susan whom verbalizes understanding and agrees to plan    Instructed to call the Anticoagulation Clinic for any changes, questions or concerns. (#[phone])        April Blair RN      OBJECTIVE:  Recent labs: (last 7 days)     06/10/21  0927   INR 2.40*         No question data found.  Anticoagulation Summary  As of 6/10/2021    INR goal:  2.0-3.0   TTR:  42.5 % (1 y)   INR used for dosing:  No new INR was available at the time of this encounter.   Warfarin maintenance plan:  7.5 mg (5 mg x 1.5) every Mon, Fri; 5 mg (5 mg x 1) all other days   Full warfarin instructions:  7.5 mg every Mon, Fri; 5 mg all other days   Weekly warfarin total:  40 mg   Plan last modified:  April Blair RN (3/11/2021)   Next INR check:  7/15/2021   Priority:  High   Target end date:  12/19/2020    Indications    DVT (deep venous thrombosis) (H) [I82.409]  Factor V Leiden (H) [D68.51]  History of deep  venous thrombosis [Z86.718]             Anticoagulation Episode Summary     INR check location:      Preferred lab:      Send INR reminders to:  YARI WHITE    Comments:        Anticoagulation Care Providers     Provider Role Specialty Phone number    Jimi Wan MD Referring Internal Medicine [phone]    Chaka Martinez MD Referring Internal Medicine [phone]

## 2021-06-17 ENCOUNTER — TRANSFERRED RECORDS (OUTPATIENT)
Dept: HEALTH INFORMATION MANAGEMENT | Facility: CLINIC | Age: 81
End: 2021-06-17

## 2021-06-17 LAB
ALT SERPL-CCNC: 21 U/L (ref 7–45)
AST SERPL-CCNC: 21 U/L (ref 8–43)
CREAT SERPL-MCNC: 0.65 MG/DL (ref 0.59–1.04)
GFR SERPL CREATININE-BSD FRML MDRD: 84 ML/MIN/BSA
GLUCOSE SERPL-MCNC: 109 MG/DL (ref 70–140)
INR PPP: 1.9 (ref 0.9–1.1)
POTASSIUM SERPL-SCNC: 4.3 MMOL/L (ref 3.6–5.2)

## 2021-06-18 ENCOUNTER — TELEPHONE (OUTPATIENT)
Dept: FAMILY MEDICINE | Facility: CLINIC | Age: 81
End: 2021-06-18

## 2021-06-18 NOTE — TELEPHONE ENCOUNTER
Pt called and reports that her INR was 2.4 on 6/10/21. She is continuing to take warfarin 7.5 mg on Mon/Fri and 5 mg all other days.  According to care everywhere at [facility], pt's INR on 6/17/21 was 1.9. Pt will continue taking 7.5 mg on Mon/Fri and 5 mg all other days. Pt is currently scheduled for the next INR check on 7/15/21. Pt did not want to schedule an INR check sooner than that through [facility].

## 2021-07-07 DIAGNOSIS — I82.409 DVT (DEEP VENOUS THROMBOSIS) (H): Primary | Chronic | ICD-10-CM

## 2021-07-15 ENCOUNTER — LAB (OUTPATIENT)
Dept: LAB | Facility: CLINIC | Age: 81
End: 2021-07-15
Payer: COMMERCIAL

## 2021-07-15 ENCOUNTER — ANTICOAGULATION THERAPY VISIT (OUTPATIENT)
Dept: ANTICOAGULATION | Facility: CLINIC | Age: 81
End: 2021-07-15

## 2021-07-15 DIAGNOSIS — Z86.718 HISTORY OF DEEP VENOUS THROMBOSIS: ICD-10-CM

## 2021-07-15 DIAGNOSIS — D68.51 FACTOR V LEIDEN (H): ICD-10-CM

## 2021-07-15 DIAGNOSIS — I82.409 DVT (DEEP VENOUS THROMBOSIS) (H): Chronic | ICD-10-CM

## 2021-07-15 DIAGNOSIS — I82.409 DVT (DEEP VENOUS THROMBOSIS) (H): Primary | ICD-10-CM

## 2021-07-15 LAB — INR BLD: 1.4 (ref 0.9–1.1)

## 2021-07-15 PROCEDURE — 85610 PROTHROMBIN TIME: CPT

## 2021-07-15 PROCEDURE — 36416 COLLJ CAPILLARY BLOOD SPEC: CPT

## 2021-07-15 NOTE — PROGRESS NOTES
ANTICOAGULATION MANAGEMENT     Susan Ferguson 81 year old female is on warfarin with subtherapeutic INR result. (Goal INR 2.0-3.0)    Recent labs: (last 7 days)     07/15/21  0818   INR 1.4*       ASSESSMENT     Source(s): Patient/Caregiver Call       Warfarin doses taken: Warfarin taken as instructed    Diet: Increased greens/vitamin K in diet; ongoing change    New illness, injury, or hospitalization: No    Medication/supplement changes: None noted    Signs or symptoms of bleeding or clotting: No    Previous INR: Subtherapeutic    Additional findings: None     PLAN     Recommended plan for ongoing change(s) affecting INR     Dosing Instructions: Booster dose then Increase your warfarin dose (6% change) with next INR in 2 weeks       Summary  As of 7/15/2021    Full warfarin instructions:  7/15: 7.5 mg; Otherwise 7.5 mg every Mon, Wed, Fri; 5 mg all other days   Next INR check:  7/29/2021             Telephone call with Susan who verbalizes understanding and agrees to plan    Lab visit scheduled    Education provided: Target INR goal and significance of current INR result, Monitoring for bleeding signs and symptoms and Monitoring for clotting signs and symptoms    Plan made per [facility] anticoagulation protocol    April Blair RN  Anticoagulation Clinic  7/15/2021    _______________________________________________________________________     Anticoagulation Episode Summary     Current INR goal:  2.0-3.0   TTR:  42.7 % (1 y)   Target end date:  12/19/2020   Send INR reminders to:  YARI WHITE    Indications    DVT (deep venous thrombosis) (H) [I82.409]  Factor V Leiden (H) [D68.51]  History of deep venous thrombosis [Z86.718]           Comments:           Anticoagulation Care Providers     Provider Role Specialty Phone number    Jimi Wan MD Referring Internal Medicine [phone]    Chaka Martinez MD Referring Internal Medicine [phone]

## 2021-07-30 DIAGNOSIS — I82.409 DVT (DEEP VENOUS THROMBOSIS) (H): ICD-10-CM

## 2021-07-30 RX ORDER — WARFARIN SODIUM 5 MG/1
TABLET ORAL
Qty: 120 TABLET | Refills: 0 | Status: SHIPPED | OUTPATIENT
Start: 2021-07-30 | End: 2021-08-10

## 2021-08-02 ENCOUNTER — ANTICOAGULATION THERAPY VISIT (OUTPATIENT)
Dept: FAMILY MEDICINE | Facility: CLINIC | Age: 81
End: 2021-08-02

## 2021-08-02 ENCOUNTER — LAB (OUTPATIENT)
Dept: LAB | Facility: CLINIC | Age: 81
End: 2021-08-02
Payer: COMMERCIAL

## 2021-08-02 DIAGNOSIS — Z86.718 HISTORY OF DEEP VENOUS THROMBOSIS: ICD-10-CM

## 2021-08-02 DIAGNOSIS — I82.409 DVT (DEEP VENOUS THROMBOSIS) (H): Primary | ICD-10-CM

## 2021-08-02 DIAGNOSIS — D68.51 FACTOR V LEIDEN (H): ICD-10-CM

## 2021-08-02 DIAGNOSIS — I82.409 DVT (DEEP VENOUS THROMBOSIS) (H): Chronic | ICD-10-CM

## 2021-08-02 LAB — INR BLD: 2.3 (ref 0.9–1.1)

## 2021-08-02 PROCEDURE — 36416 COLLJ CAPILLARY BLOOD SPEC: CPT

## 2021-08-02 PROCEDURE — 85610 PROTHROMBIN TIME: CPT

## 2021-08-02 NOTE — PROGRESS NOTES
ANTICOAGULATION MANAGEMENT     Susan Ferguson 81 year old female is on warfarin with therapeutic INR result. (Goal INR 2.0-3.0)    Recent labs: (last 7 days)     08/02/21  0813   INR 2.3*       ASSESSMENT     Source(s): Chart Review Talked with Susan    Warfarin doses taken: Warfarin taken as instructed    Diet: No new diet changes identified    New illness, injury, or hospitalization: No    Medication/supplement changes: None noted    Signs or symptoms of bleeding or clotting: No    Previous INR: Subtherapeutic    Additional findings: None     PLAN     Recommended plan for no diet, medication or health factor changes affecting INR     Dosing Instructions: Continue your current warfarin dose with next INR in 4 weeks       Summary  As of 8/2/2021    Full warfarin instructions:  7.5 mg every Mon, Wed, Fri; 5 mg all other days   Next INR check:  8/31/2021             Telephone call with Susan who verbalizes understanding and agrees to plan    Lab visit scheduled    Education provided: Please call back if any changes to your diet, medications or how you've been taking warfarin    Plan made per [facility] anticoagulation protocol    Eden Montez RN  Anticoagulation Clinic  8/2/2021    _______________________________________________________________________     Anticoagulation Episode Summary     Current INR goal:  2.0-3.0   TTR:  39.4 % (1 y)   Target end date:  12/19/2020   Send INR reminders to:  YARI WHITE    Indications    DVT (deep venous thrombosis) (H) [I82.409]  Factor V Leiden (H) [D68.51]  History of deep venous thrombosis [Z86.718]           Comments:           Anticoagulation Care Providers     Provider Role Specialty Phone number    Jimi Wan MD Referring Internal Medicine [phone]    Chaka Martinez MD Referring Internal Medicine [phone]

## 2021-08-02 NOTE — PROGRESS NOTES
ANTICOAGULATION FOLLOW-UP CLINIC VISIT    Patient Name:  Susan Ferguson  Date:  8/2/2021  Contact Type:  Telephone    SUBJECTIVE:         OBJECTIVE    Recent labs: (last 7 days)     08/02/21  0813   INR 2.3*       ASSESSMENT / PLAN  INR assessment THER    Recheck INR In: 4 WEEKS    INR Location Clinic      Anticoagulation Summary  As of 8/2/2021    INR goal:  2.0-3.0   TTR:  39.4 % (1 y)   INR used for dosing:     Warfarin maintenance plan:  7.5 mg (5 mg x 1.5) every Mon, Wed, Fri; 5 mg (5 mg x 1) all other days   Full warfarin instructions:  7.5 mg every Mon, Wed, Fri; 5 mg all other days   Weekly warfarin total:  42.5 mg   No change documented:  Eden Montez RN   Plan last modified:  April Blair RN (7/15/2021)   Next INR check:  8/31/2021   Priority:  Maintenance   Target end date:  12/19/2020    Indications    DVT (deep venous thrombosis) (H) [I82.409]  Factor V Leiden (H) [D68.51]  History of deep venous thrombosis [Z86.718]             Anticoagulation Episode Summary     INR check location:      Preferred lab:      Send INR reminders to:  YARI WHITE    Comments:        Anticoagulation Care Providers     Provider Role Specialty Phone number    Jimi Wan MD Referring Internal Medicine [phone]    Chaka Martinez MD Referring Internal Medicine [phone]            See the Encounter Report to view Anticoagulation Flowsheet and Dosing Calendar (Go to Encounters tab in chart review, and find the Anticoagulation Therapy Visit)        Eden Montez, RN

## 2021-08-07 DIAGNOSIS — I82.409 DVT (DEEP VENOUS THROMBOSIS) (H): ICD-10-CM

## 2021-08-07 NOTE — TELEPHONE ENCOUNTER
Miketoven 5 mg tablet    Summary: Take 7.5 mg on Mon/Wed/Fri and 5 mg all other days or as directed by the INR clinic, Disp-120 tablet, R-0, CRISTINE, E-Prescribe   Start: 7/30/2021  Ord/Sold: 7/30/2021    Pharmacy is requesting refills.

## 2021-08-09 NOTE — TELEPHONE ENCOUNTER
[facility]   Thank you,  Lien VALLEJO RN,BSN

## 2021-08-10 RX ORDER — WARFARIN SODIUM 5 MG/1
TABLET ORAL
Qty: 115 TABLET | Refills: 0 | Status: SHIPPED | OUTPATIENT
Start: 2021-08-10 | End: 2022-01-01

## 2021-08-10 NOTE — TELEPHONE ENCOUNTER
Warfarin was just refilled on 7/30/21. Writer approved one more 3 month supply and made a note for pharmacy to profile. Previous INR stable at 2.3 on 8/2/21. Patient should be allowed 6 month supply. Chrissy Tate, TOÑON, RN

## 2021-08-18 ENCOUNTER — TRANSFERRED RECORDS (OUTPATIENT)
Dept: HEALTH INFORMATION MANAGEMENT | Facility: CLINIC | Age: 81
End: 2021-08-18

## 2021-09-04 ENCOUNTER — HEALTH MAINTENANCE LETTER (OUTPATIENT)
Age: 81
End: 2021-09-04

## 2021-09-07 ENCOUNTER — ANTICOAGULATION THERAPY VISIT (OUTPATIENT)
Dept: ANTICOAGULATION | Facility: CLINIC | Age: 81
End: 2021-09-07

## 2021-09-07 ENCOUNTER — APPOINTMENT (OUTPATIENT)
Dept: LAB | Facility: CLINIC | Age: 81
End: 2021-09-07
Payer: COMMERCIAL

## 2021-09-07 ENCOUNTER — LAB (OUTPATIENT)
Dept: LAB | Facility: CLINIC | Age: 81
End: 2021-09-07
Payer: COMMERCIAL

## 2021-09-07 DIAGNOSIS — Z86.718 HISTORY OF DEEP VENOUS THROMBOSIS: ICD-10-CM

## 2021-09-07 DIAGNOSIS — I82.409 DVT (DEEP VENOUS THROMBOSIS) (H): Chronic | ICD-10-CM

## 2021-09-07 DIAGNOSIS — D68.51 FACTOR V LEIDEN (H): ICD-10-CM

## 2021-09-07 DIAGNOSIS — I82.409 DVT (DEEP VENOUS THROMBOSIS) (H): Primary | ICD-10-CM

## 2021-09-07 LAB — INR BLD: 5.1 (ref 0.9–1.1)

## 2021-09-07 PROCEDURE — 85610 PROTHROMBIN TIME: CPT

## 2021-09-07 PROCEDURE — 36416 COLLJ CAPILLARY BLOOD SPEC: CPT

## 2021-09-07 NOTE — PROGRESS NOTES
ANTICOAGULATION MANAGEMENT     Susan Ferguson 81 year old female is on warfarin with supratherapeutic INR result. (Goal INR 2.0-3.0)    Recent labs: (last 7 days)     09/07/21  0808   INR 5.1*       ASSESSMENT     Source(s): Chart Review and Patient/Caregiver Call       Warfarin doses taken: Less warfarin taken than planned which may be affecting INR    Diet: Increased greens/vitamin K in diet; ongoing change    New illness, injury, or hospitalization: Yes: recent chemo/radiation treatment    Medication/supplement changes: None noted    Signs or symptoms of bleeding or clotting: No    Previous INR: Therapeutic last visit; previously outside of goal range    Additional findings: None     PLAN     Recommended plan for ongoing change(s) affecting INR     Dosing Instructions: Hold full 5 mg dose today and partial 2.5 mg dose tomorrow doses then continue your current warfarin dose with next INR in 5 days.  Patient unable to come by end of week for INR check, relies on daughter for transportation.    Changed calendar to reflect how patient has been taking warfarin.      Summary  As of 9/7/2021    Full warfarin instructions:  9/7: Hold; 9/8: 2.5 mg; Otherwise 7.5 mg every Mon, Wed, Fri; 5 mg all other days   Next INR check:  9/10/2021             Telephone call with Susan who verbalizes understanding and agrees to plan    Lab visit scheduled    Education provided: Importance of consistent vitamin K intake, Impact of vitamin K foods on INR, Goal range and significance of current result, Importance of therapeutic range, Importance of following up at instructed interval, Importance of taking warfarin as instructed, Monitoring for bleeding signs and symptoms and When to seek medical attention/emergency care    Plan made with [facility] Pharmacist Alicia Spence RN  Anticoagulation Clinic  9/7/2021    _______________________________________________________________________     Anticoagulation Episode  Summary     Current INR goal:  2.0-3.0   TTR:  41.0 % (1 y)   Target end date:  12/19/2020   Send INR reminders to:  YARI WHITE    Indications    DVT (deep venous thrombosis) (H) [I82.409]  Factor V Leiden (H) [D68.51]  History of deep venous thrombosis [Z86.718]           Comments:           Anticoagulation Care Providers     Provider Role Specialty Phone number    Jimi Wan MD Referring Internal Medicine [phone]    Chaka Martinez MD Referring Internal Medicine [phone]

## 2021-09-13 ENCOUNTER — LAB (OUTPATIENT)
Dept: LAB | Facility: CLINIC | Age: 81
End: 2021-09-13
Payer: COMMERCIAL

## 2021-09-13 ENCOUNTER — ANTICOAGULATION THERAPY VISIT (OUTPATIENT)
Dept: ANTICOAGULATION | Facility: CLINIC | Age: 81
End: 2021-09-13

## 2021-09-13 DIAGNOSIS — D68.51 FACTOR V LEIDEN (H): ICD-10-CM

## 2021-09-13 DIAGNOSIS — I82.409 DVT (DEEP VENOUS THROMBOSIS) (H): Primary | ICD-10-CM

## 2021-09-13 DIAGNOSIS — I82.409 DVT (DEEP VENOUS THROMBOSIS) (H): Chronic | ICD-10-CM

## 2021-09-13 DIAGNOSIS — Z86.718 HISTORY OF DEEP VENOUS THROMBOSIS: ICD-10-CM

## 2021-09-13 LAB — INR BLD: 3 (ref 0.9–1.1)

## 2021-09-13 PROCEDURE — 85610 PROTHROMBIN TIME: CPT

## 2021-09-13 PROCEDURE — 36416 COLLJ CAPILLARY BLOOD SPEC: CPT

## 2021-09-13 NOTE — PROGRESS NOTES
ANTICOAGULATION MANAGEMENT     Susan Ferguson 81 year old female is on warfarin with therapeutic INR result. (Goal INR 2.0-3.0)    Recent labs: (last 7 days)     09/13/21  1146   INR 3.0*       ASSESSMENT     Source(s): Patient/Caregiver Call       Warfarin doses taken: Warfarin taken as instructed    Diet: No new diet changes identified    New illness, injury, or hospitalization: No    Medication/supplement changes: None noted    Signs or symptoms of bleeding or clotting: No    Previous INR: Supratherapeutic    Additional findings: has been having constipation issues, is going to take Miralax      PLAN     Recommended plan for no diet, medication or health factor changes affecting INR     Dosing Instructions: Continue your current warfarin dose with next INR in 2 weeks       Summary  As of 9/13/2021    Full warfarin instructions:  7.5 mg every Mon, Fri; 5 mg all other days   Next INR check:               Telephone call with Susan who verbalizes understanding and agrees to plan    Patient elected to schedule next visit in 2 weeks, relies on daughter for transportation     Education provided: Importance of consistent vitamin K intake, Goal range and significance of current result, Monitoring for bleeding signs and symptoms and Monitoring for clotting signs and symptoms    Plan made per [facility] anticoagulation protocol    April Blair RN  Anticoagulation Clinic  9/13/2021    _______________________________________________________________________     Anticoagulation Episode Summary     Current INR goal:  2.0-3.0   TTR:  40.5 % (1 y)   Target end date:  Indefinite   Send INR reminders to:  YARI WHITE    Indications    DVT (deep venous thrombosis) (H) [I82.409]  Factor V Leiden (H) [D68.51]  History of deep venous thrombosis [Z86.718]           Comments:           Anticoagulation Care Providers     Provider Role Specialty Phone number    Jimi Wan MD Referring Internal Medicine [phone]    Juan  Chaka Gao MD [facility] Internal Medicine [phone]

## 2021-09-27 ENCOUNTER — LAB (OUTPATIENT)
Dept: LAB | Facility: CLINIC | Age: 81
End: 2021-09-27
Payer: COMMERCIAL

## 2021-09-27 ENCOUNTER — ANTICOAGULATION THERAPY VISIT (OUTPATIENT)
Dept: ANTICOAGULATION | Facility: CLINIC | Age: 81
End: 2021-09-27

## 2021-09-27 DIAGNOSIS — I82.409 DVT (DEEP VENOUS THROMBOSIS) (H): Chronic | ICD-10-CM

## 2021-09-27 DIAGNOSIS — I82.409 DVT (DEEP VENOUS THROMBOSIS) (H): Primary | ICD-10-CM

## 2021-09-27 DIAGNOSIS — D68.51 FACTOR V LEIDEN (H): ICD-10-CM

## 2021-09-27 DIAGNOSIS — Z86.718 HISTORY OF DEEP VENOUS THROMBOSIS: ICD-10-CM

## 2021-09-27 LAB — INR BLD: 2.8 (ref 0.9–1.1)

## 2021-09-27 PROCEDURE — 85610 PROTHROMBIN TIME: CPT

## 2021-09-27 PROCEDURE — 36416 COLLJ CAPILLARY BLOOD SPEC: CPT

## 2021-09-27 NOTE — PROGRESS NOTES
ANTICOAGULATION MANAGEMENT     Susan Ferguson 81 year old female is on warfarin with therapeutic INR result. (Goal INR 2.0-3.0)    Recent labs: (last 7 days)     09/27/21  0831   INR 2.8*       ASSESSMENT     Source(s): Chart Review and Patient/Caregiver Call       Warfarin doses taken: Warfarin taken as instructed    Diet: No new diet changes identified    New illness, injury, or hospitalization: No    Medication/supplement changes: using a half envelope of Miralax    Signs or symptoms of bleeding or clotting: No    Previous INR: Therapeutic last visit; previously outside of goal range    Additional findings: None     PLAN     Recommended plan for no diet, medication or health factor changes affecting INR     Dosing Instructions: Continue your current warfarin dose with next INR in 3 weeks.  Patient states she will come back in 4 or 5 weeks     Summary  As of 9/27/2021    Full warfarin instructions:  7.5 mg every Mon, Fri; 5 mg all other days   Next INR check:  10/18/2021             Telephone call with Susan who verbalizes understanding and agrees to plan    Lab visit scheduled    Education provided: Goal range and significance of current result, Importance of therapeutic range, Importance of following up at instructed interval and Importance of taking warfarin as instructed    Plan made per [facility] anticoagulation protocol    Gabbei Spence, RN  Anticoagulation Clinic  9/27/2021    _______________________________________________________________________     Anticoagulation Episode Summary     Current INR goal:  2.0-3.0   TTR:  42.8 % (1 y)   Target end date:  Indefinite   Send INR reminders to:  YARI WHITE    Indications    DVT (deep venous thrombosis) (H) [I82.409]  Factor V Leiden (H) [D68.51]  History of deep venous thrombosis [Z86.718]           Comments:           Anticoagulation Care Providers     Provider Role Specialty Phone number    Jimi Wan MD Referring Internal Medicine [phone]     Chaka Martinez MD Referring Internal Medicine [phone]

## 2021-10-25 ENCOUNTER — ANTICOAGULATION THERAPY VISIT (OUTPATIENT)
Dept: ANTICOAGULATION | Facility: CLINIC | Age: 81
End: 2021-10-25
Payer: COMMERCIAL

## 2021-10-25 ENCOUNTER — LAB (OUTPATIENT)
Dept: LAB | Facility: CLINIC | Age: 81
End: 2021-10-25
Payer: COMMERCIAL

## 2021-10-25 DIAGNOSIS — I82.409 DVT (DEEP VENOUS THROMBOSIS) (H): Chronic | ICD-10-CM

## 2021-10-25 DIAGNOSIS — D68.51 FACTOR V LEIDEN (H): ICD-10-CM

## 2021-10-25 DIAGNOSIS — Z86.718 HISTORY OF DEEP VENOUS THROMBOSIS: ICD-10-CM

## 2021-10-25 DIAGNOSIS — I82.409 DVT (DEEP VENOUS THROMBOSIS) (H): Primary | ICD-10-CM

## 2021-10-25 LAB — INR BLD: 2.7 (ref 0.9–1.1)

## 2021-10-25 PROCEDURE — 36416 COLLJ CAPILLARY BLOOD SPEC: CPT

## 2021-10-25 PROCEDURE — 99207 PR NO CHARGE NURSE ONLY: CPT

## 2021-10-25 PROCEDURE — 85610 PROTHROMBIN TIME: CPT

## 2021-10-25 NOTE — PROGRESS NOTES
ANTICOAGULATION MANAGEMENT     Susan Ferguson 81 year old female is on warfarin with therapeutic INR result. (Goal INR 2.0-3.0)    Recent labs: (last 7 days)     10/25/21  0822   INR 2.7*       ASSESSMENT     Source(s): Chart Review and Patient/Caregiver Call       Warfarin doses taken: Warfarin taken as instructed    Diet: No new diet changes identified    New illness, injury, or hospitalization: No    Medication/supplement changes: None noted    Signs or symptoms of bleeding or clotting: No    Previous INR: Therapeutic last 2(+) visits    Additional findings: None     PLAN     Recommended plan for no diet, medication or health factor changes affecting INR     Dosing Instructions: Continue your current warfarin dose with next INR in 6 weeks       Summary  As of 10/25/2021    Full warfarin instructions:  7.5 mg every Mon, Fri; 5 mg all other days   Next INR check:  12/6/2021             Telephone call with Susan who verbalizes understanding and agrees to plan    Lab visit scheduled    Education provided: Please call back if any changes to your diet, medications or how you've been taking warfarin    Plan made per [facility] anticoagulation protocol    Eden Montez RN  Anticoagulation Clinic  10/25/2021    _______________________________________________________________________     Anticoagulation Episode Summary     Current INR goal:  2.0-3.0   TTR:  47.7 % (1 y)   Target end date:  Indefinite   Send INR reminders to:  YARI WHITE    Indications    DVT (deep venous thrombosis) (H) [I82.409]  Factor V Leiden (H) [D68.51]  History of deep venous thrombosis [Z86.718]           Comments:           Anticoagulation Care Providers     Provider Role Specialty Phone number    Jimi Wan MD Referring Internal Medicine [phone]    Chaka Martinez MD Referring Internal Medicine [phone]

## 2021-10-25 NOTE — PROGRESS NOTES
ANTICOAGULATION FOLLOW-UP CLINIC VISIT    Patient Name:  Susan Ferguson  Date:  10/25/2021  Contact Type:  Telephone    SUBJECTIVE:         OBJECTIVE    Recent labs: (last 7 days)     10/25/21  0822   INR 2.7*       ASSESSMENT / PLAN  INR assessment THER    Recheck INR In: 6 WEEKS    INR Location Clinic      Anticoagulation Summary  As of 10/25/2021    INR goal:  2.0-3.0   TTR:  47.7 % (1 y)   INR used for dosin.7 (10/25/2021)   Warfarin maintenance plan:  7.5 mg (5 mg x 1.5) every Mon, Fri; 5 mg (5 mg x 1) all other days   Full warfarin instructions:  7.5 mg every Mon, Fri; 5 mg all other days   Weekly warfarin total:  40 mg   No change documented:  Eden Montez RN   Plan last modified:  Gabbie Spence RN (2021)   Next INR check:  2021   Priority:  Maintenance   Target end date:  Indefinite    Indications    DVT (deep venous thrombosis) (H) [I82.409]  Factor V Leiden (H) [D68.51]  History of deep venous thrombosis [Z86.718]             Anticoagulation Episode Summary     INR check location:      Preferred lab:      Send INR reminders to:  YARI WHITE    Comments:        Anticoagulation Care Providers     Provider Role Specialty Phone number    Jimi Wan MD Referring Internal Medicine [phone]    Chaka Martinez MD Referring Internal Medicine [phone]            See the Encounter Report to view Anticoagulation Flowsheet and Dosing Calendar (Go to Encounters tab in chart review, and find the Anticoagulation Therapy Visit)        Eden Montez, RN

## 2021-10-27 ENCOUNTER — TRANSFERRED RECORDS (OUTPATIENT)
Dept: HEALTH INFORMATION MANAGEMENT | Facility: CLINIC | Age: 81
End: 2021-10-27
Payer: COMMERCIAL

## 2021-10-28 ENCOUNTER — TRANSFERRED RECORDS (OUTPATIENT)
Dept: HEALTH INFORMATION MANAGEMENT | Facility: CLINIC | Age: 81
End: 2021-10-28
Payer: COMMERCIAL

## 2021-12-06 ENCOUNTER — TELEPHONE (OUTPATIENT)
Dept: FAMILY MEDICINE | Facility: CLINIC | Age: 81
End: 2021-12-06

## 2021-12-06 ENCOUNTER — LAB (OUTPATIENT)
Dept: LAB | Facility: CLINIC | Age: 81
End: 2021-12-06
Payer: COMMERCIAL

## 2021-12-06 ENCOUNTER — ANTICOAGULATION THERAPY VISIT (OUTPATIENT)
Dept: ANTICOAGULATION | Facility: CLINIC | Age: 81
End: 2021-12-06

## 2021-12-06 DIAGNOSIS — Z86.718 HISTORY OF DEEP VENOUS THROMBOSIS: ICD-10-CM

## 2021-12-06 DIAGNOSIS — I82.409 DVT (DEEP VENOUS THROMBOSIS) (H): Chronic | ICD-10-CM

## 2021-12-06 DIAGNOSIS — D68.51 FACTOR V LEIDEN (H): ICD-10-CM

## 2021-12-06 DIAGNOSIS — I82.409 DVT (DEEP VENOUS THROMBOSIS) (H): Primary | ICD-10-CM

## 2021-12-06 LAB — INR BLD: 1.4 (ref 0.9–1.1)

## 2021-12-06 PROCEDURE — 36416 COLLJ CAPILLARY BLOOD SPEC: CPT

## 2021-12-06 PROCEDURE — 85610 PROTHROMBIN TIME: CPT

## 2021-12-06 NOTE — TELEPHONE ENCOUNTER
Anticoagulation Management    Discussed INR home monitoring program with Susan Ferguson reviewing:      [employer] requirements: >= 3 months of anticoagulation therapy, indication for chronic anticoagulation and order from provider    Required testing frequency (q1-2 weeks)    Home meters, testing supplies, meter training, and reporting of INR results done through an outside company. Patient would be contacted by home monitoring company to review insurance coverage with home monitoring company prior to enrolling.    [facility] would continue to receive and manage INR results.    Home monitoring application may take several weeks and must continue to follow up with recommended INR monitoring in clinic until receives monitor and training completed.     Home monitoring terms reviewed with patient      Patient agrees to frequency of testing as directed by referring provider ( weekly or biweekly) Yes    Testing to be performed during business hours of [facility] Yes    Patient agrees they have the skill (or a designated caregiver) necessary to perform the self test Yes    Patient agrees to report all INR results to INR home monitoring company Yes    Patient agrees to have additional INR test in clinic if a home result is critical Yes    Patient agrees to schedule an INR test at a [facility] clinic yearly for technique observation and quality check of INR results with their home meter Yes    Patient agrees to use a [facility] approved service provider and device for home monitoring Yes    [facility]    Referring provider: plans on transferring care to Dr. Dominguez, will set up an appt. Will send order and see if he will sign until patient can be seen.  If not, then patient will need to wait to sign up for a home monitor.     Referring providers Clinic Fax number [phone]    Susan Ferguson is interested home INR monitoring and requests order be submitted.

## 2021-12-06 NOTE — PROGRESS NOTES
ANTICOAGULATION MANAGEMENT     Susan Ferguson 81 year old female is on warfarin with subtherapeutic INR result. (Goal INR 2.0-3.0)    Recent labs: (last 7 days)     12/06/21  0802   INR 1.4*       ASSESSMENT     Source(s): Chart Review and Patient/Caregiver Call       Warfarin doses taken: Warfarin taken as instructed    Diet: Increased greens/vitamin K in diet; ongoing change    New illness, injury, or hospitalization: No    Medication/supplement changes: None noted    Signs or symptoms of bleeding or clotting: No    Previous INR: Therapeutic last 2(+) visits    Additional findings: only changes are that she is eating more greens and taking Olive Oil for constipation      PLAN     Recommended plan for ongoing change(s) affecting INR     Dosing Instructions: Booster dose then Increase your warfarin dose (6% change) with next INR in 2 weeks       Summary  As of 12/6/2021    Full warfarin instructions:  7.5 mg every Mon, Wed, Fri; 5 mg all other days   Next INR check:  12/13/2021             Telephone call with Susan who verbalizes understanding and agrees to plan    Patient elected to schedule next visit in 2 weeks, did advise 1 week but patient prefers 2 weeks  Is also interested in a home monitor will send a message to start the application process.    Education provided: Importance of consistent vitamin K intake, Goal range and significance of current result, Monitoring for bleeding signs and symptoms and Monitoring for clotting signs and symptoms    Plan made per [facility] anticoagulation protocol    April Blair, RN  Anticoagulation Clinic  12/6/2021    _______________________________________________________________________     Anticoagulation Episode Summary     Current INR goal:  2.0-3.0   TTR:  51.1 % (1 y)   Target end date:  Indefinite   Send INR reminders to:  [facility]    Indications    DVT (deep venous thrombosis) (H) [I82.409]  Factor V Leiden (H) [D68.51]  History of deep venous thrombosis  [W43.971]           Comments:           Anticoagulation Care Providers     Provider Role Specialty Phone number    Jimi Wan MD Referring Internal Medicine [phone]    Chaka Martinez MD Referring Internal Medicine [phone]

## 2021-12-20 NOTE — PROGRESS NOTES
ANTICOAGULATION MANAGEMENT     Susan Ferguson 81 year old female is on warfarin with therapeutic INR result. (Goal INR 2.0-3.0)    Recent labs: (last 7 days)     12/20/21  0849   INR 2.7*       ASSESSMENT     Source(s): Chart Review and Patient/Caregiver Call       Warfarin doses taken: Warfarin taken as instructed    Diet: No new diet changes identified    New illness, injury, or hospitalization: No    Medication/supplement changes: None noted    Signs or symptoms of bleeding or clotting: No    Previous INR: Subtherapeutic    Additional findings: None     PLAN     Recommended plan for no diet, medication or health factor changes affecting INR     Dosing Instructions: Continue your current warfarin dose with next INR in 5 weeks       Summary  As of 12/20/2021    Full warfarin instructions:  7.5 mg every Mon, Wed, Fri; 5 mg all other days   Next INR check:               Telephone call with Susan who verbalizes understanding and agrees to plan    Patient elected to schedule next visit in 5 weeks, she does not want to come in any sooner.     Education provided: Monitoring for bleeding signs and symptoms and Monitoring for clotting signs and symptoms    Plan made per [facility] anticoagulation protocol    April Blair RN  Anticoagulation Clinic  12/20/2021    _______________________________________________________________________     Anticoagulation Episode Summary     Current INR goal:  2.0-3.0   TTR:  53.1 % (1 y)   Target end date:  Indefinite   Send INR reminders to:  [facility]    Indications    DVT (deep venous thrombosis) (H) [I82.409]  Factor V Leiden (H) [D68.51]  History of deep venous thrombosis [Z86.718]           Comments:           Anticoagulation Care Providers     Provider Role Specialty Phone number    Jimi Wan MD Referring Internal Medicine [phone]    Chaka Martinez MD Referring Internal Medicine [phone]        Anticoagulation Management    Unable to reach Susan  today.    Today's INR result of 2.7 is therapeutic (goal INR of 2.0-3.0).  Result received from: Clinic Lab    Follow up required to confirm warfarin dose taken and assess for changes    Left message to continue current dose of warfarin 7.5 mg tonight.      Anticoagulation clinic to follow up    April Blair RN

## 2022-01-01 ENCOUNTER — ANTICOAGULATION THERAPY VISIT (OUTPATIENT)
Dept: ANTICOAGULATION | Facility: CLINIC | Age: 82
End: 2022-01-01

## 2022-01-01 ENCOUNTER — TELEPHONE (OUTPATIENT)
Dept: ENDOCRINOLOGY | Facility: CLINIC | Age: 82
End: 2022-01-01

## 2022-01-01 ENCOUNTER — LAB (OUTPATIENT)
Dept: LAB | Facility: CLINIC | Age: 82
End: 2022-01-01
Payer: COMMERCIAL

## 2022-01-01 ENCOUNTER — TRANSFERRED RECORDS (OUTPATIENT)
Dept: ENDOCRINOLOGY | Facility: CLINIC | Age: 82
End: 2022-01-01

## 2022-01-01 ENCOUNTER — PRE VISIT (OUTPATIENT)
Dept: ENDOCRINOLOGY | Facility: CLINIC | Age: 82
End: 2022-01-01

## 2022-01-01 ENCOUNTER — TRANSFERRED RECORDS (OUTPATIENT)
Dept: HEALTH INFORMATION MANAGEMENT | Facility: CLINIC | Age: 82
End: 2022-01-01

## 2022-01-01 ENCOUNTER — TELEPHONE (OUTPATIENT)
Dept: ANTICOAGULATION | Facility: CLINIC | Age: 82
End: 2022-01-01
Payer: COMMERCIAL

## 2022-01-01 ENCOUNTER — ANTICOAGULATION THERAPY VISIT (OUTPATIENT)
Dept: ANTICOAGULATION | Facility: CLINIC | Age: 82
End: 2022-01-01
Payer: COMMERCIAL

## 2022-01-01 ENCOUNTER — TELEPHONE (OUTPATIENT)
Dept: FAMILY MEDICINE | Facility: CLINIC | Age: 82
End: 2022-01-01
Payer: COMMERCIAL

## 2022-01-01 ENCOUNTER — MEDICAL CORRESPONDENCE (OUTPATIENT)
Dept: HEALTH INFORMATION MANAGEMENT | Facility: CLINIC | Age: 82
End: 2022-01-01
Payer: COMMERCIAL

## 2022-01-01 ENCOUNTER — TRANSFERRED RECORDS (OUTPATIENT)
Dept: MULTI SPECIALTY CLINIC | Facility: CLINIC | Age: 82
End: 2022-01-01

## 2022-01-01 ENCOUNTER — TELEPHONE (OUTPATIENT)
Dept: FAMILY MEDICINE | Facility: CLINIC | Age: 82
End: 2022-01-01

## 2022-01-01 ENCOUNTER — OFFICE VISIT (OUTPATIENT)
Dept: FAMILY MEDICINE | Facility: CLINIC | Age: 82
End: 2022-01-01
Payer: COMMERCIAL

## 2022-01-01 ENCOUNTER — HOSPITAL ENCOUNTER (OUTPATIENT)
Dept: BONE DENSITY | Facility: CLINIC | Age: 82
Discharge: HOME | End: 2022-02-28
Attending: INTERNAL MEDICINE | Admitting: INTERNAL MEDICINE
Payer: COMMERCIAL

## 2022-01-01 ENCOUNTER — DOCUMENTATION ONLY (OUTPATIENT)
Dept: ANTICOAGULATION | Facility: CLINIC | Age: 82
End: 2022-01-01

## 2022-01-01 ENCOUNTER — HEALTH MAINTENANCE LETTER (OUTPATIENT)
Age: 82
End: 2022-01-01

## 2022-01-01 ENCOUNTER — TELEPHONE (OUTPATIENT)
Dept: ANTICOAGULATION | Facility: CLINIC | Age: 82
End: 2022-01-01

## 2022-01-01 VITALS
BODY MASS INDEX: 31.47 KG/M2 | HEART RATE: 80 BPM | DIASTOLIC BLOOD PRESSURE: 84 MMHG | RESPIRATION RATE: 16 BRPM | TEMPERATURE: 97.6 F | HEIGHT: 65 IN | SYSTOLIC BLOOD PRESSURE: 138 MMHG | OXYGEN SATURATION: 97 % | WEIGHT: 188.9 LBS

## 2022-01-01 DIAGNOSIS — D68.51 FACTOR V LEIDEN (H): ICD-10-CM

## 2022-01-01 DIAGNOSIS — I82.401 ACUTE DEEP VEIN THROMBOSIS (DVT) OF RIGHT LOWER EXTREMITY, UNSPECIFIED VEIN (H): ICD-10-CM

## 2022-01-01 DIAGNOSIS — D68.51 FACTOR V LEIDEN (H): Chronic | ICD-10-CM

## 2022-01-01 DIAGNOSIS — C56.9 OVARIAN CANCER, UNSPECIFIED LATERALITY (H): ICD-10-CM

## 2022-01-01 DIAGNOSIS — I82.409 DVT (DEEP VENOUS THROMBOSIS) (H): Primary | ICD-10-CM

## 2022-01-01 DIAGNOSIS — Z86.718 HISTORY OF DEEP VENOUS THROMBOSIS: ICD-10-CM

## 2022-01-01 DIAGNOSIS — E55.9 VITAMIN D DEFICIENCY: ICD-10-CM

## 2022-01-01 DIAGNOSIS — I82.409 DVT (DEEP VENOUS THROMBOSIS) (H): Primary | Chronic | ICD-10-CM

## 2022-01-01 DIAGNOSIS — Z79.01 LONG TERM CURRENT USE OF ANTICOAGULANTS WITH INR GOAL OF 2.0-3.0: ICD-10-CM

## 2022-01-01 DIAGNOSIS — Z78.0 ASYMPTOMATIC POSTMENOPAUSAL STATUS: ICD-10-CM

## 2022-01-01 DIAGNOSIS — D68.51 FACTOR V LEIDEN (H): Primary | Chronic | ICD-10-CM

## 2022-01-01 DIAGNOSIS — R60.0 PERIPHERAL EDEMA: ICD-10-CM

## 2022-01-01 DIAGNOSIS — C78.6 PERITONEAL CARCINOMATOSIS (H): ICD-10-CM

## 2022-01-01 DIAGNOSIS — I48.91 ATRIAL FIBRILLATION, UNSPECIFIED TYPE (H): ICD-10-CM

## 2022-01-01 DIAGNOSIS — D68.51 FACTOR V LEIDEN (H): Primary | ICD-10-CM

## 2022-01-01 DIAGNOSIS — I82.4Y9 DEEP VEIN THROMBOSIS (DVT) OF PROXIMAL LOWER EXTREMITY, UNSPECIFIED CHRONICITY, UNSPECIFIED LATERALITY (H): Chronic | ICD-10-CM

## 2022-01-01 DIAGNOSIS — Z79.01 LONG TERM CURRENT USE OF ANTICOAGULANTS WITH INR GOAL OF 2.0-3.0: Primary | ICD-10-CM

## 2022-01-01 DIAGNOSIS — I82.409 DVT (DEEP VENOUS THROMBOSIS) (H): ICD-10-CM

## 2022-01-01 DIAGNOSIS — I82.4Y9 DEEP VEIN THROMBOSIS (DVT) OF PROXIMAL LOWER EXTREMITY, UNSPECIFIED CHRONICITY, UNSPECIFIED LATERALITY (H): Primary | Chronic | ICD-10-CM

## 2022-01-01 DIAGNOSIS — M06.9 RHEUMATOID ARTHRITIS, INVOLVING UNSPECIFIED SITE, UNSPECIFIED WHETHER RHEUMATOID FACTOR PRESENT (H): ICD-10-CM

## 2022-01-01 DIAGNOSIS — I82.4Y9 DEEP VEIN THROMBOSIS (DVT) OF PROXIMAL LOWER EXTREMITY, UNSPECIFIED CHRONICITY, UNSPECIFIED LATERALITY (H): ICD-10-CM

## 2022-01-01 DIAGNOSIS — I82.409 DVT (DEEP VENOUS THROMBOSIS) (H): Chronic | ICD-10-CM

## 2022-01-01 DIAGNOSIS — E53.8 VITAMIN B12 DEFICIENCY (NON ANEMIC): ICD-10-CM

## 2022-01-01 DIAGNOSIS — K59.03 DRUG-INDUCED CONSTIPATION: ICD-10-CM

## 2022-01-01 DIAGNOSIS — E05.90 HYPERTHYROIDISM: ICD-10-CM

## 2022-01-01 DIAGNOSIS — E05.90 HYPERTHYROIDISM: Primary | ICD-10-CM

## 2022-01-01 LAB
ALT SERPL-CCNC: 13 U/L (ref 7–45)
ALT SERPL-CCNC: 15 U/L (ref 7–45)
AST SERPL-CCNC: 26 U/L (ref 8–43)
AST SERPL-CCNC: 33 U/L (ref 8–43)
CREATININE (EXTERNAL): 0.55 MG/DL (ref 0.59–1.04)
CREATININE (EXTERNAL): 0.6 MG/DL (ref 0.59–1.04)
DEPRECATED CALCIDIOL+CALCIFEROL SERPL-MC: 45 UG/L (ref 20–75)
GFR ESTIMATED (EXTERNAL): 85 ML/MIN/BSA
GFR ESTIMATED (EXTERNAL): 88 ML/MIN/BSA
GFR ESTIMATED (IF AFRICAN AMERICAN) (EXTERNAL): >90 ML/MIN/BSA
GFR ESTIMATED (IF AFRICAN AMERICAN) (EXTERNAL): >90 ML/MIN/BSA
GLUCOSE (EXTERNAL): 115 MG/DL (ref 70–140)
GLUCOSE (EXTERNAL): 147 MG/DL (ref 70–140)
INR (EXTERNAL): 1.4 (ref 0.9–1.1)
INR (EXTERNAL): 4.6 (ref 0.8–1.1)
INR BLD: 1.3 (ref 0.9–1.1)
INR BLD: 2.5 (ref 0.9–1.1)
INR BLD: 2.9 (ref 0.9–1.1)
INR BLD: 8 (ref 0.9–1.1)
INR HOME MONITORING: 1 (ref 2–3)
INR HOME MONITORING: 1.2 (ref 2–3)
INR HOME MONITORING: 1.4 (ref 2–3)
INR HOME MONITORING: 1.5 (ref 2–3)
INR HOME MONITORING: 1.6 (ref 2–3)
INR HOME MONITORING: 1.8 (ref 2–3)
INR HOME MONITORING: 2 (ref 2–3)
INR HOME MONITORING: 2.1 (ref 2–3)
INR HOME MONITORING: 2.3 (ref 2–3)
INR HOME MONITORING: 2.3 (ref 2–3)
INR HOME MONITORING: 2.4 (ref 2–3)
INR HOME MONITORING: 2.5 (ref 2–3)
INR HOME MONITORING: 2.6 (ref 2–3)
INR HOME MONITORING: 2.6 (ref 2–3)
INR HOME MONITORING: 2.8 (ref 2–3)
INR HOME MONITORING: 3.1 (ref 2–3)
INR HOME MONITORING: 3.4 (ref 2–3)
INR HOME MONITORING: 3.4 (ref 2–3)
INR HOME MONITORING: 4.6 (ref 2–3)
INR HOME MONITORING: 6 (ref 2–3)
INR HOME MONITORING: 8 (ref 2–3)
INR PPP: 5.43 (ref 0.85–1.15)
POTASSIUM (EXTERNAL): 4.2 MMOL/L (ref 3.6–5.2)
POTASSIUM (EXTERNAL): 4.2 MMOL/L (ref 3.6–5.2)
T4 FREE SERPL-MCNC: 1.55 NG/DL (ref 0.76–1.46)
TSH SERPL DL<=0.005 MIU/L-ACNC: <0.01 MU/L (ref 0.4–4)
VIT B12 SERPL-MCNC: 470 PG/ML (ref 193–986)

## 2022-01-01 PROCEDURE — 77080 DXA BONE DENSITY AXIAL: CPT

## 2022-01-01 PROCEDURE — 36416 COLLJ CAPILLARY BLOOD SPEC: CPT

## 2022-01-01 PROCEDURE — 84439 ASSAY OF FREE THYROXINE: CPT | Performed by: INTERNAL MEDICINE

## 2022-01-01 PROCEDURE — 85610 PROTHROMBIN TIME: CPT

## 2022-01-01 PROCEDURE — 36415 COLL VENOUS BLD VENIPUNCTURE: CPT | Performed by: INTERNAL MEDICINE

## 2022-01-01 PROCEDURE — 99205 OFFICE O/P NEW HI 60 MIN: CPT | Performed by: INTERNAL MEDICINE

## 2022-01-01 PROCEDURE — 85610 PROTHROMBIN TIME: CPT | Performed by: INTERNAL MEDICINE

## 2022-01-01 PROCEDURE — 82607 VITAMIN B-12: CPT | Performed by: INTERNAL MEDICINE

## 2022-01-01 PROCEDURE — 36415 COLL VENOUS BLD VENIPUNCTURE: CPT

## 2022-01-01 PROCEDURE — 82306 VITAMIN D 25 HYDROXY: CPT | Performed by: INTERNAL MEDICINE

## 2022-01-01 PROCEDURE — 84443 ASSAY THYROID STIM HORMONE: CPT | Performed by: INTERNAL MEDICINE

## 2022-01-01 RX ORDER — WARFARIN SODIUM 5 MG/1
TABLET ORAL
Qty: 115 TABLET | Refills: 0 | Status: SHIPPED | OUTPATIENT
Start: 2022-01-01 | End: 2022-01-01

## 2022-01-01 RX ORDER — WARFARIN SODIUM 5 MG/1
TABLET ORAL
Qty: 100 TABLET | Refills: 1 | Status: SHIPPED | OUTPATIENT
Start: 2022-01-01 | End: 2022-01-01

## 2022-01-01 RX ORDER — FUROSEMIDE 20 MG
20 TABLET ORAL DAILY
Qty: 60 TABLET | Refills: 1 | Status: SHIPPED | OUTPATIENT
Start: 2022-01-01

## 2022-01-01 RX ORDER — WARFARIN SODIUM 5 MG/1
TABLET ORAL
Qty: 100 TABLET | Refills: 1 | Status: SHIPPED | OUTPATIENT
Start: 2022-01-01

## 2022-01-24 NOTE — PROGRESS NOTES
ANTICOAGULATION MANAGEMENT     Susan Ferguson 81 year old female is on warfarin with therapeutic INR result. 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      PLAN     Recommended plan for no diet, medication or health factor changes affecting INR     Dosing Instructions: Continue your current warfarin dose with next INR in 5 weeks       Summary  As of 1/24/2022    Full warfarin instructions:  7.5 mg every Mon, Wed, Fri; 5 mg all other days   Next INR check:  2/28/2022             Telephone call with Susan who verbalizes understanding and agrees to plan    Lab visit scheduled    Education provided: Please call back if any changes to your diet, medications or how you've been taking warfarin, Importance of consistent vitamin K intake, Impact of vitamin K foods on INR, Goal range and significance of current result and Contact [phone]  with any changes, questions or concerns.     Plan made per [facility] anticoagulation protocol    Reyna Orona RN  Anticoagulation Clinic  1/24/2022    _______________________________________________________________________     Anticoagulation Episode Summary     Current INR goal:  2.0-3.0   TTR:  61.1 % (1 y)   Target end date:  Indefinite   Send INR reminders to:  [facility]    Indications    DVT (deep venous thrombosis) (H) [I82.409]  Factor V Leiden (H) [D68.51]  History of deep venous thrombosis [Z86.718]            Comments:           Anticoagulation Care Providers     Provider Role Specialty Phone number    Jimi Wan MD Referring Internal Medicine [phone]    Chaka Martinez MD Referring Internal Medicine [phone]

## 2022-01-24 NOTE — CONFIDENTIAL NOTE
Reason for Call:   returning call    Detailed comments: The patient is returning a call to [facility] regarding her INR today. Please advise at the number provided.    Phone Number Patient can be reached at: Mobile number on file [phone]    Best Time: as soon as possible    Can we leave a detailed message on this number? YES    Call taken on 1/24/2022 at 4:10 PM by Evelin Iniguez

## 2022-01-24 NOTE — Clinical Note
Please have staff help patient set up appt to establish care. This akua RN attempted to call patient but no answer.

## 2022-01-24 NOTE — PROGRESS NOTES
ANTICOAGULATION MANAGEMENT      Susan Yulisa Ferguson due for annual renewal of referral to anticoagulation monitoring. Order pended for your review and signature.      ANTICOAGULATION SUMMARY      Warfarin indication(s)     DVT  Factor V Leiden    Heart valve present?  NO       Current goal range   INR: 2.0-3.0     Goal appropriate for indication? Yes, INR 2-3 appropriate for hx of DVT, PE, hypercoagulable state, Afib, LVAD, or bileaflet AVR without risk factors     Current duration of therapy Indefinite/long term therapy   Time in Therapeutic Range (TTR)  (Goal > 60%) 61.1%       Office visit with referring provider's group within last year No. Last office visit with Dr. Wan on 7/29/20. Prior Anticoag Referral was signed by Dr. Martinez. All of patient's office visits over the last 2 years have been at [place]. Per note from 12/6/21, patient was planning to establish with Dr. Dominguez and was interested in INR Home monitor, but never made an appointment.        Reyna Orona RN

## 2022-02-03 NOTE — TELEPHONE ENCOUNTER
Jantoven 5 mg tablet    Summary: Current dose (8/10/21): Take 7.5 mg on Mon/Wed/Fri and 5 mg all other days or as directed by the INR clinic., Disp-115 tablet, R-0, CRISTINE, E-Prescribe  Rx was just signed on 7/30/21. Please profile this one. Thanks!       Start: 8/10/2021    Ord/Sold: 8/10/2021

## 2022-02-03 NOTE — TELEPHONE ENCOUNTER
Warfarin - Prescription approved per [facility] Refill Protocol.    Rosa M Flor RN   [facility]

## 2022-02-28 PROBLEM — C78.6 PERITONEAL CARCINOMATOSIS (H): Status: ACTIVE | Noted: 2022-01-01

## 2022-02-28 PROBLEM — C56.9 OVARIAN CANCER, UNSPECIFIED LATERALITY (H): Status: ACTIVE | Noted: 2022-01-01

## 2022-02-28 PROBLEM — E66.01 MORBID OBESITY (H): Status: RESOLVED | Noted: 2020-07-29 | Resolved: 2022-01-01

## 2022-02-28 PROBLEM — I82.401 ACUTE DEEP VEIN THROMBOSIS (DVT) OF RIGHT LOWER EXTREMITY, UNSPECIFIED VEIN (H): Status: ACTIVE | Noted: 2022-01-01

## 2022-02-28 PROBLEM — K59.03 DRUG-INDUCED CONSTIPATION: Status: ACTIVE | Noted: 2022-01-01

## 2022-02-28 PROBLEM — E53.8 VITAMIN B12 DEFICIENCY (NON ANEMIC): Status: ACTIVE | Noted: 2022-01-01

## 2022-02-28 PROBLEM — E05.90 HYPERTHYROIDISM: Status: ACTIVE | Noted: 2022-01-01

## 2022-02-28 PROBLEM — E55.9 VITAMIN D DEFICIENCY: Status: ACTIVE | Noted: 2022-01-01

## 2022-02-28 PROBLEM — R60.0 PERIPHERAL EDEMA: Status: ACTIVE | Noted: 2022-01-01

## 2022-02-28 PROBLEM — Z79.01 LONG TERM CURRENT USE OF ANTICOAGULANTS WITH INR GOAL OF 2.0-3.0: Status: ACTIVE | Noted: 2022-01-01

## 2022-02-28 PROBLEM — Z78.0 ASYMPTOMATIC POSTMENOPAUSAL STATUS: Status: ACTIVE | Noted: 2022-01-01

## 2022-02-28 PROBLEM — M06.9 RHEUMATOID ARTHRITIS, INVOLVING UNSPECIFIED SITE, UNSPECIFIED WHETHER RHEUMATOID FACTOR PRESENT (H): Status: ACTIVE | Noted: 2022-01-01

## 2022-02-28 NOTE — PATIENT INSTRUCTIONS
There is this is a new shingles vaccine available called shingrex  It is a series of 2 shots 2-6 months apart.  Considered more than 90% effective.  Please go to any pharmacy to get the  vaccine    You are due for a DEXA scan.  Please call the following number to make appointment :  [phone]  It is located in [address]

## 2022-02-28 NOTE — PROGRESS NOTES
ANTICOAGULATION MANAGEMENT     Susan Ferguson 81 year old female is on warfarin with subtherapeutic INR result. (Goal INR 2.0-3.0)    Recent labs: (last 7 days)     02/28/22  0914   INR 1.3*       ASSESSMENT       Source(s): Chart Review and Patient/Caregiver Call       Warfarin doses taken: Missed dose(s) may be affecting INR    Diet: Increased greens/vitamin K in diet; plans to resume previous intake Had a bunch of spinach and broccoli in the freezer that needed to be used.    New illness, injury, or hospitalization: No, though thyroid low, will be following up on that.    Medication/supplement changes: States she hasn't had any chemo in the last six months, but has a f/u appt at [facility] and on 3/10/22 and may be restarting.    Signs or symptoms of bleeding or clotting: No    Previous INR: Therapeutic last 2(+) visits    Additional findings: Received a home monitor and has education scheduled on Friday at 3:00        PLAN     Recommended plan for temporary change(s) affecting INR     Dosing Instructions: Booster dose then continue your current warfarin dose with next INR in 4 days       Summary  As of 2/28/2022    Full warfarin instructions:  2/28: 15 mg; Otherwise 7.5 mg every Mon, Wed, Fri; 5 mg all other days   Next INR check:  3/4/2022             Telephone call with Susan who verbalizes understanding and agrees to plan    Patient to recheck with home meter    Education provided: Please call back if any changes to your diet, medications or how you've been taking warfarin, Importance of consistent vitamin K intake, Goal range and significance of current result, Monitoring for bleeding signs and symptoms, Monitoring for clotting signs and symptoms, Importance of notifying clinic for changes in medications; a sooner lab recheck maybe needed. and Contact [phone]  with any changes, questions or concerns.     Plan made per [facility] anticoagulation protocol    Reyna Orona RN  Anticoagulation  Clinic  2/28/2022    _______________________________________________________________________     Anticoagulation Episode Summary     Current INR goal:  2.0-3.0   TTR:  64.6 % (1 y)   Target end date:  Indefinite   Send INR reminders to:  [facility]    Indications    DVT (deep venous thrombosis) (H) [I82.409]  Factor V Leiden (H) [D68.51]  History of deep venous thrombosis [Z86.718]           Comments:           Anticoagulation Care Providers     Provider Role Specialty Phone number    Jimi Wan MD Referring Internal Medicine [phone]    Chaka Martinez MD Referring Internal Medicine [phone]

## 2022-02-28 NOTE — TELEPHONE ENCOUNTER
Pharmacy calling for medication Lasix update.     Ree Pyle RN  -[facility]

## 2022-02-28 NOTE — PROGRESS NOTES
Assessment & Plan     Factor V Leiden (H)  Stable.  Currently on anticoagulation with warfarin derivative Jantoven.  Patient gets regular INR checks as well.    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  Patient currently taking a break from chemotherapy and opted for surveillance.    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    Atrial fibrillation, unspecified type (H)  Currently not on any medication for heart rate control.  Pulse is 80 in the clinic today.  On anticoagulation, also for another condition.    Rheumatoid arthritis, involving unspecified site, unspecified whether rheumatoid factor present (H)  No treatment at this time.  Patient takes Tylenol as needed.    Peripheral edema  She was started on Lasix 20 mg daily for bilateral lower extremity edema and has been feeling better with this medication.  - furosemide (LASIX) 20 MG tablet; Take 1 tablet (20 mg) by mouth daily    Vitamin D deficiency  - Vitamin D Deficiency; Future    Vitamin B12 deficiency (non anemic)  - Vitamin B12; Future    Drug-induced constipation  Patient reports constipation from chemotherapy.  She currently uses all of oil patient's for preop 3 we have never met like uncontrolled blood pressure to schedule this which helps with constipation however she was told by INR nurse that olive oil can cause her INR to fluctuate.  I discussed with the patient to use fiber rich foods which can be oats.    History of deep venous thrombosis  History of blood clots in bilateral legs.    Asymptomatic postmenopausal status  History of osteopenia.  Will repeat DEXA scan.  - DX Hip/Pelvis/Spine; Future    Hyperthyroidism  Patient has history of low TSH on multiple occasions.  No other tests were done.  I will refer her to endocrinology for further evaluation.  - TSH with free T4 reflex; Future  - Adult Endocrinology  Referral; Future  - TSH with free T4 reflex    DVT (deep venous  "thrombosis) (H)  - INR point of care       BMI:   Estimated body mass index is 31.43 kg/m  as calculated from the following:    Height as of this encounter: 1.651 m (5' 5\").    Weight as of this encounter: 85.7 kg (188 lb 14.4 oz).   Weight management plan: Discussed healthy diet and exercise guidelines    See Patient Instructions    Return in about 2 months (around 4/28/2022) for Follow up, Routine preventive, with me.    SAMMIE OSEI MD  [facility]  Kenna Davis is a 81 year old who presents for the following health issues     History of Present Illness     Reason for visit:  The INR nurse said in order to get my Roge I needed to see the doctor.  Symptom onset:  More than a month  Symptoms include:  Factor V Leiden  Symptom intensity:  Mild  Symptom progression:  Staying the same  Had these symptoms before:  Yes  Has tried/received treatment for these symptoms:  Yes  Previous treatment was successful:  Yes  Prior treatment description:  Roge    She eats 4 or more servings of fruits and vegetables daily.She consumes 1 sweetened beverage(s) daily.She exercises with enough effort to increase her heart rate 9 or less minutes per day.  She exercises with enough effort to increase her heart rate 3 or less days per week.   She is taking medications regularly.     Susan is a 81-year-old lady who presented to the clinic to establish care.  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  Per chart review, after multiple chemotherapy cycles patient has opted for surveillance of her cancer.  She is undergoing treatment at [facility].  She also has factor V Leiden mutation and history of multiple blood clots.  Patient is currently on Jantoven which is a derivative of warfarin.  She gets INR checks regularly.  She has rheumatoid arthritis as well and currently does not take any medications.  She has history of abnormal thyroid function test.  Her TSH has been low in the past " and has never been on medication for it.  Before setting of INR checks she needed visit with primary care doctor.  Her physician at [facility] requested vitamin D and B12 levels.  Patient has not received COVID-19 vaccination.  Per patient, her doctors at [facility] that she should not get the vaccine because of history of blood clots.  She is due for shingles vaccine.  She had a DEXA scan in 2014 which showed osteopenia.  She has family history of factor V Leiden mutation in both mom and dad.  She also had thyroid disease from her dad's side.  She does not smoke and does not drink alcohol.      Review of Systems       Objective    There were no vitals taken for this visit.  There is no height or weight on file to calculate BMI.  Physical Exam

## 2022-02-28 NOTE — LETTER
March 3, 2022      Susan Ferguson  [address] [place]   [place] [address]        Elizabeth Davis,      I reviewed your lab results and they are:     Normal vitamin D level     Normal B 12 level      Low TSH and high free T4 which means hyperthyroidism. I am referring you to endocrinology.   [facility] will call you to coordinate your care as prescribed by the provider.  If you don't hear from a representative within 2 business days, please call [phone]     Let me know if you have any questions or concerns     Dr Savannah BALLARD [facility]    Resulted Orders   Vitamin B12   Result Value Ref Range    Vitamin B12 470 193 - 986 pg/mL   Vitamin D Deficiency   Result Value Ref Range    Vitamin D, Total (25-Hydroxy) 45 20 - 75 ug/L    Narrative    Season, race, dietary intake, and treatment affect the concentration of 25-hydroxy-Vitamin D. Values may decrease during winter months and increase during summer months. Values 20-29 ug/L may indicate Vitamin D insufficiency and values <20 ug/L may indicate Vitamin D deficiency.    Vitamin D determination is routinely performed by an immunoassay specific for 25 hydroxyvitamin D3.  If an individual is on vitamin D2(ergocalciferol) supplementation, please specify 25 OH vitamin D2 and D3 level determination by LCMSMS test VITD23.     TSH with free T4 reflex   Result Value Ref Range    TSH <0.01 (L) 0.40 - 4.00 mU/L   T4 free   Result Value Ref Range    Free T4 1.55 (H) 0.76 - 1.46 ng/dL

## 2022-03-01 NOTE — TELEPHONE ENCOUNTER
APPT NOTES: Hyperthyroidism, referred by Dr. Savannah Moe    For Cancer Patients: Need the original operative and surgical pathology reports and all imaging reports/images related to the disease (includes all thyroid US, nuclear thyroid and total body scans, PET scans, chest CT reports since prior to the diagnosis ).   APPT DATE: 5/23/2022   NOTES (FOR ALL VISITS) STATUS DETAILS   OFFICE NOTES from referring provider [facility]  [place]:  2/28/22 - PCC OV with Dr. Moe   OFFICE NOTES from other specialist Care Everywhere / Internal [place]:  1/13/22 - ONC OV with Dr. Mehdi Cassidy - [place]:  7/29/20 - PCC OV with Dr. Wan  1/16/18 - PCC OV with Dr. Vela   ED NOTES Care Everywhere [place]:  9/25/20 - Admission with Dr. Rick   OPERATIVE REPORT  (thyroid, pituitary, adrenal, parathyroid)  (All op notes related to diagnoses) N/A    MEDICATION LIST Care Everywhere    IMAGING      DEXASCAN Internal [facility]:  2/28/22 - DEXA  11/14/14 - DEXA   CT (HEAD/NECK/CHEST/ABDOMEN) Received / Internal [place]:  3/10/22 - CT Chest  3/10/22 - CT Abd/Pelvis  1/13/22 - CT Abd/Pelvis  1/13/22 - CT Chest  10/27/21 - CT Abd/Pelvis  10/27/21 - CT Chest  8/18/21 - CT Chest  8/18/21 - CT Abd/Pelvis  1/9/21 - CT Neck  1/9/21 - CT Head  9/25/20 - CT Head    [facility]:  9/17/20 - CT Abd/Pelvis   LABS     DIABETES: HBGA1C, CREATININE, FASTING LIPIDS, MICROALBUMIN URINE, POTASSIUM, TSH, T4    THYROID: TSH, T4, CBC, THYRODLONULIN, TOTAL T3, FREE T4, CALCITONIN, CEA Care Everywhere / Internal [place]:  1/13/22 - Creatinine  8/18/21 - CBC  6/17/21 - CMP  6/17/21 - Potassium  9/29/20 - BMP  9/25/20 - Calcium    [facility]:  2/28/22 - Vitamin D  2/28/22- TSH, T4  2/28/22 - Thyroid Peroxidase  9/17/20 - Routine UA  7/29/20 - Lipid     Records Requested  03/01/22    [facility]  Fax: [phone]   Outcome * 3/1/22 4:06 PM Faxed req to [place] for images to be pushed to [facility] PACs. - [place]    * 3/23/22 8:17 AM Images received from [place] and attached to  the patient in PACs. - [place]    * 4/22/22 8:29 AM Faxed urg req to [facility] for images to be pushed to [facility] PACS. - [place]    * 5/2/22 10:09 AM Additional images received from [facility] and attached to the patient in PACs. - [place]

## 2022-03-03 NOTE — TELEPHONE ENCOUNTER
----- Message from Savannah Moe MD sent at 3/3/2022 10:28 AM CST -----  Please call patient for test results. See result note. New referral.       Elizabeth Davis,      I reviewed your lab results and they are:     Normal vitamin D level     Normal B 12 level      Low TSH and high free T4 which means hyperthyroidism. I am referring you to endocrinology.   [facility] will call you to coordinate your care as prescribed by the provider.  If you don't hear from a representative within 2 business days, please call [phone]     Let me know if you have any questions or concerns     Dr Savannah Moe  [facility] for patient to call back or to check her mychart.    Camille Patel MA

## 2022-03-09 NOTE — PROGRESS NOTES
ANTICOAGULATION MANAGEMENT     Susan Ferguson 81 year old female is on warfarin with therapeutic INR result. (Goal INR 2.0-3.0)    Recent labs: (last 7 days)     03/04/22  0000   INR 2.00       ASSESSMENT       Source(s): Chart Review and Patient/Caregiver Call       Warfarin doses taken: Warfarin taken as instructed    Diet: No new diet changes identified    New illness, injury, or hospitalization: No    Medication/supplement changes: None noted    Signs or symptoms of bleeding or clotting: No    Previous INR: Therapeutic last visit; previously outside of goal range    Additional findings: None       PLAN     Recommended plan for no diet, medication or health factor changes affecting INR     Dosing Instructions: Continue your current warfarin dose with next INR in 2 weeks       Summary  As of 3/9/2022    Full warfarin instructions:  7.5 mg every Mon, Wed, Fri; 5 mg all other days   Next INR check:  3/18/2022             Telephone call with Susan who verbalizes understanding and agrees to plan    Patient to recheck with home meter    Education provided: Please call back if any changes to your diet, medications or how you've been taking warfarin    Plan made per [facility] anticoagulation protocol    Seda Sanchez, RN  Anticoagulation Clinic  3/9/2022    _______________________________________________________________________     Anticoagulation Episode Summary     Current INR goal:  2.0-3.0   TTR:  63.3 % (1 y)   Target end date:  Indefinite   Send INR reminders to:  [facility]    Indications    DVT (deep venous thrombosis) (H) [I82.409]  Factor V Leiden (H) [D68.51]  History of deep venous thrombosis [Z86.718]  Long term current use of anticoagulants with INR goal of 2.0-3.0 [Z79.01]  Acute deep vein thrombosis (DVT) of right lower extremity  unspecified vein (H) [I82.401]           Comments:  [employer] meter         Anticoagulation Care Providers     Provider Role Specialty Phone number    Jimi Wan MD  Referring Internal Medicine [phone]    Chaka Martinez MD Referring Internal Medicine [phone]    Savannah Moe MD Referring Internal Medicine [phone]

## 2022-03-24 NOTE — PROGRESS NOTES
ANTICOAGULATION MANAGEMENT     Susan Ferguson 81 year old female is on warfarin with therapeutic INR result. (Goal INR 2.0-3.0)    Recent labs: (last 7 days)     03/24/22  1031   INR 2.5*       ASSESSMENT       Source(s): Chart Review    Previous INR was Therapeutic last visit; previously outside of goal range    Medication, diet, health changes since last INR chart reviewed; none identified           PLAN     Recommended plan for no diet, medication or health factor changes affecting INR     Dosing Instructions: Continue your current warfarin dose with next INR in 2 weeks       Summary  As of 3/24/2022    Full warfarin instructions:  7.5 mg every Mon, Wed, Fri; 5 mg all other days   Next INR check:  4/7/2022             Detailed voice message left for Susan with dosing instructions and follow up date.     Patient to recheck with home meter    Education provided: Please call back if any changes to your diet, medications or how you've been taking warfarin    Plan made per [facility] anticoagulation protocol    Zohreh Smith RN  Anticoagulation Clinic  3/24/2022    _______________________________________________________________________     Anticoagulation Episode Summary     Current INR goal:  2.0-3.0   TTR:  63.9 % (1 y)   Target end date:  Indefinite   Send INR reminders to:  [facility]    Indications    DVT (deep venous thrombosis) (H) [I82.409]  Factor V Leiden (H) [D68.51]  History of deep venous thrombosis [Z86.718]  Long term current use of anticoagulants with INR goal of 2.0-3.0 [Z79.01]  Acute deep vein thrombosis (DVT) of right lower extremity  unspecified vein (H) [I82.401]           Comments:  [employer] meter         Anticoagulation Care Providers     Provider Role Specialty Phone number    Jimi Wan MD Referring Internal Medicine [phone]    Chaka Martinez MD Referring Internal Medicine [phone]    Savannah Moe MD Referring Internal Medicine [phone]

## 2022-04-08 NOTE — PROGRESS NOTES
ANTICOAGULATION MANAGEMENT     Susanserafin Ferguson 81 year old female is on warfarin with therapeutic INR result. (Goal INR 2.0-3.0)    Recent labs: (last 7 days)     04/08/22  0000   INR 2.80       ASSESSMENT       Source(s): Chart Review and Patient/Caregiver Call       Warfarin doses taken: Warfarin taken as instructed    Diet: Decreased greens/vitamin K in diet; plans to resume previous intake    New illness, injury, or hospitalization: No    Medication/supplement changes: None noted    Signs or symptoms of bleeding or clotting: No    Previous INR: Therapeutic last 2(+) visits    Additional findings: None       PLAN     Recommended plan for no diet, medication or health factor changes affecting INR     Dosing Instructions: continue your current warfarin dose with next INR in 2 weeks       Summary  As of 4/8/2022    Full warfarin instructions:  7.5 mg every Mon, Wed, Fri; 5 mg all other days   Next INR check:  4/22/2022             Telephone call with Susan who verbalizes understanding and agrees to plan    Patient to recheck with home meter    Education provided: Please call back if any changes to your diet, medications or how you've been taking warfarin    Plan made per [facility] anticoagulation protocol    Seda Sanchez, RN  Anticoagulation Clinic  4/8/2022    _______________________________________________________________________     Anticoagulation Episode Summary     Current INR goal:  2.0-3.0   TTR:  63.8 % (1 y)   Target end date:  Indefinite   Send INR reminders to:  [facility]    Indications    DVT (deep venous thrombosis) (H) [I82.409]  Factor V Leiden (H) [D68.51]  History of deep venous thrombosis [Z86.718]  Long term current use of anticoagulants with INR goal of 2.0-3.0 [Z79.01]  Acute deep vein thrombosis (DVT) of right lower extremity  unspecified vein (H) [I82.401]           Comments:  [employer] [facility] meter         Anticoagulation Care Providers     Provider Role Specialty Phone number    Savage  Jimi SANDERS MD Referring Internal Medicine [phone]    Chaka Martinez MD Referring Internal Medicine [phone]    Savannah Moe MD Referring Internal Medicine [phone]

## 2022-04-29 NOTE — PROGRESS NOTES
ANTICOAGULATION MANAGEMENT     Susan Ferguson 81 year old female is on warfarin with supratherapeutic INR result. (Goal INR 2.0-3.0)    Recent labs: (last 7 days)     04/29/22  0000   INR 4.60*       ASSESSMENT       Source(s): Chart Review and Patient/Caregiver Call       Warfarin doses taken: Warfarin taken as instructed    Diet: Had a lot of mayonnaise within the last week and Susan states historically this has elevated her INR. Also has reduced her protein/boost drink from daily to ~4x weekly which is likely affecting INR    New illness, injury, or hospitalization: No    Medication/supplement changes: None noted    Signs or symptoms of bleeding or clotting: No    Previous INR: Therapeutic last 2(+) visits    Additional findings: None       PLAN     Recommended plan for ongoing change(s) affecting INR     Dosing Instructions: hold dose then decrease your warfarin dose (12% change) with next INR in 7-10 days       Summary  As of 4/29/2022    Full warfarin instructions:  4/29: Hold; Otherwise 7.5 mg every Wed; 5 mg all other days   Next INR check:  5/9/2022             Telephone call with Susan who verbalizes understanding and agrees to plan    Patient to recheck with home meter    Education provided: Impact of vitamin K foods on INR, Goal range and significance of current result, Monitoring for bleeding signs and symptoms and Contact [phone]  with any changes, questions or concerns.     Plan made per [facility] anticoagulation protocol    Carri Tran RN  Anticoagulation Clinic  4/29/2022    _______________________________________________________________________     Anticoagulation Episode Summary     Current INR goal:  2.0-3.0   TTR:  59.9 % (1 y)   Target end date:  Indefinite   Send INR reminders to:  [facility]    Indications    DVT (deep venous thrombosis) (H) [I17.699]  Factor V Leiden (H) [D68.51]  History of deep venous thrombosis [Z86.718]  Long term current use of anticoagulants with INR goal  of 2.0-3.0 [Z79.01]  Acute deep vein thrombosis (DVT) of right lower extremity  unspecified vein (H) [I82.401]           Comments:  [employer] meter         Anticoagulation Care Providers     Provider Role Specialty Phone number    Jimi Wan MD Referring Internal Medicine [phone]    Chaka Martinez MD Referring Internal Medicine [phone]    Savannah Moe MD Referring Internal Medicine [phone]

## 2022-05-04 NOTE — TELEPHONE ENCOUNTER
ANTICOAGULATION MANAGEMENT:  Medication Refill    Anticoagulation Summary  As of 4/29/2022    Warfarin maintenance plan:  7.5 mg (5 mg x 1.5) every Wed; 5 mg (5 mg x 1) all other days   Next INR check:  5/9/2022   Target end date:  Indefinite    Indications    DVT (deep venous thrombosis) (H) [I82.409]  Factor V Leiden (H) [D68.51]  History of deep venous thrombosis [Z86.718]  Long term current use of anticoagulants with INR goal of 2.0-3.0 [Z79.01]  Acute deep vein thrombosis (DVT) of right lower extremity  unspecified vein (H) [I82.401]             Anticoagulation Care Providers     Provider Role Specialty Phone number    Jimi Wan MD Referring Internal Medicine [phone]    Chaka Martinez MD Referring Internal Medicine [phone]    Savannah Moe MD Referring Internal Medicine [phone]          Visit with referring provider/group within last year: Yes    [facility] referral signed within last year: Yes    Susan meets all criteria for refill (current [facility] referral, office visit with referring provider/group in last year, lab monitoring up to date or not exceeding 2 weeks overdue). Rx instructions and quantity supplied updated to match patient's current dosing plan. Warfarin 90 day supply with 1 refill granted per [facility] protocol     Seda Sanchez RN  Anticoagulation Clinic

## 2022-05-04 NOTE — TELEPHONE ENCOUNTER
Last INR 4-, next due 2 weeks (home monitor)    LOV 2- Payal, follow up 2 months     Please remind patient that she is due for a follow up visit with Dr Moe (not scheduled)    RT Susy (R)

## 2022-05-09 NOTE — PROGRESS NOTES
ANTICOAGULATION MANAGEMENT     Susan Ferguson 81 year old female is on warfarin with subtherapeutic INR result. (Goal INR 2.0-3.0)    Recent labs: (last 7 days)     05/09/22  0000   INR 1.40*       ASSESSMENT       Source(s): Chart Review and Patient/Caregiver Call       Warfarin doses taken: Warfarin taken as instructed    Diet: Patient reports inconsistent greens intake. Patient started protein drinks daily with 30 grams of Vitamin K. Patient is consuming prunes daily to help with constipation.    New illness, injury, or hospitalization: No    Medication/supplement changes: None noted    Signs or symptoms of bleeding or clotting: No    Previous INR: Supratherapeutic    Additional findings: patient reports 25 pound weight loss of 3 months.       PLAN     Recommended plan for ongoing change(s) affecting INR     Dosing Instructions: Increase your warfarin dose (13.3% change) with next INR in 1 week       Summary  As of 5/9/2022    Full warfarin instructions:  5/9: 10 mg; Otherwise 7.5 mg every Mon, Wed, Fri; 5 mg all other days   Next INR check:  5/16/2022             Telephone call with Susan who agrees to plan and repeated back plan correctly    Patient to recheck with home meter    Education provided: Please call back if any changes to your diet, medications or how you've been taking warfarin, Importance of consistent vitamin K intake, Impact of vitamin K foods on INR, Vitamin K content of foods, Goal range and significance of current result and Importance of notifying clinic for changes in medications; a sooner lab recheck maybe needed.    Plan made per [facility] anticoagulation protocol    Yazmin Gupta RN  Anticoagulation Clinic  5/9/2022    _______________________________________________________________________     Anticoagulation Episode Summary     Current INR goal:  2.0-3.0   TTR:  58.0 % (1 y)   Target end date:  Indefinite   Send INR reminders to:  Y[facility]I [facility]IN    Indications    DVT (deep venous  thrombosis) (H) [I82.409]  Factor V Leiden (H) [D68.51]  History of deep venous thrombosis [Z86.718]  Long term current use of anticoagulants with INR goal of 2.0-3.0 [Z79.01]  Acute deep vein thrombosis (DVT) of right lower extremity  unspecified vein (H) [I82.401]           Comments:  [employer] meter         Anticoagulation Care Providers     Provider Role Specialty Phone number    Jimi Wan MD Referring Internal Medicine [phone]    Chaka Martinez MD Referring Internal Medicine [phone]    Savannah Moe MD Referring Internal Medicine [phone]

## 2022-05-17 NOTE — TELEPHONE ENCOUNTER
ANTICOAGULATION     Susanserafin Ferguson is overdue for INR check.      Left message  reminding patient to check INR with their home meter and call results to the home monitoring company as soon as possible.     Carri Tran RN

## 2022-05-23 NOTE — PROGRESS NOTES
ANTICOAGULATION MANAGEMENT     Susan Ferguson 81 year old female is on warfarin with therapeutic INR result. (Goal INR 2.0-3.0)    Recent labs: (last 7 days)     05/23/22  0000   INR 2.40       ASSESSMENT       Source(s): Chart Review and Patient/Caregiver Call       Warfarin doses taken: Warfarin taken as instructed    Diet: No new diet changes identified    New illness, injury, or hospitalization: No    Medication/supplement changes: None noted    Signs or symptoms of bleeding or clotting: No    Previous INR: Subtherapeutic    Additional findings: None       PLAN     Recommended plan for no diet, medication or health factor changes affecting INR     Dosing Instructions: continue your current warfarin dose with next INR in 2 weeks       Summary  As of 5/23/2022    Full warfarin instructions:  7.5 mg every Mon, Wed, Fri; 5 mg all other days   Next INR check:  6/6/2022             Telephone call with Susan who agrees to plan and repeated back plan correctly    Patient to recheck with home meter    Education provided: Please call back if any changes to your diet, medications or how you've been taking warfarin    Plan made per [facility] anticoagulation protocol    Seda Sanchez RN  Anticoagulation Clinic  5/23/2022    _______________________________________________________________________     Anticoagulation Episode Summary     Current INR goal:  2.0-3.0   TTR:  55.8 % (1 y)   Target end date:  Indefinite   Send INR reminders to:  [facility]    Indications    DVT (deep venous thrombosis) (H) [I82.409]  Factor V Leiden (H) [D68.51]  History of deep venous thrombosis [Z86.718]  Long term current use of anticoagulants with INR goal of 2.0-3.0 [Z79.01]  Acute deep vein thrombosis (DVT) of right lower extremity  unspecified vein (H) [I82.401]           Comments:  [employer] meter- Managed by [employer]         Anticoagulation Care Providers     Provider Role Specialty Phone number    Jimi Wan MD Referring  Internal Medicine [phone]    Chaka Martinez MD Referring Internal Medicine [phone]    Savannah Moe MD Referring Internal Medicine [phone]

## 2022-05-29 NOTE — TELEPHONE ENCOUNTER
Patient left VM on [facility] phone stating her appointment time today was supposed to be for 9:15am, not 11:15am - patient is unable to make 11:15am appointment due to ride.   Patient reports 9:15am was the agreed upon time and would like to have this appointment.     Please call patient.     Mckenzie Colon, RN, BSN, PHN     
Patient rescheduled and notified.    April Blair RN  [facility] Anticoagulation [facility]     
Adult

## 2022-06-06 NOTE — PROGRESS NOTES
Call received from [facility] reporting critical INR on 1.2. Informed them we have received this result.  Martha Ugalde RN

## 2022-06-06 NOTE — PROGRESS NOTES
"ANTICOAGULATION MANAGEMENT     Susan Ferguson 81 year old female is on warfarin with subtherapeutic INR result. (Goal INR 2.0-3.0)    Recent labs: (last 7 days)     06/06/22  0000   INR 1.20*       ASSESSMENT       Source(s): Chart Review and Patient/Caregiver Call       Warfarin doses taken: Missed dose(s) may be affecting INR    Diet: Increased greens/vitamin K in diet; plans to resume previous intake    New illness, injury, or hospitalization: No    Medication/supplement changes: cyclophosphamide added to regimen per up to date: \"Cyclophosphamide may decrease the serum concentration of Warfarin. Cyclophosphamide may increase the serum concentration of Warfarin. Severity Minor Reliability Rating Fair    Signs or symptoms of bleeding or clotting: No    Previous INR: Therapeutic last visit; previously outside of goal range    Additional findings: None       PLAN     Recommended plan for temporary change(s) and ongoing change(s) affecting INR     Dosing Instructions: booster dose then Increase your warfarin dose (11.8% change) with next INR in 4 days. Patient felt uncomfortable increasing rajan to 12.5 today. Patient would like to recheck INR before the weekend    Summary  As of 6/6/2022    Full warfarin instructions:  6/6: 10 mg; Otherwise 5 mg every Sun, Thu; 7.5 mg all other days   Next INR check:  6/10/2022             Telephone call with Susan who verbalizes understanding and agrees to plan and who agrees to plan and repeated back plan correctly    Patient to recheck with home meter    Education provided: Please call back if any changes to your diet, medications or how you've been taking warfarin    Plan made per [facility] anticoagulation protocol    Maine Jefferson, RN  Anticoagulation Clinic  6/6/2022    _______________________________________________________________________     Anticoagulation Episode Summary     Current INR goal:  2.0-3.0   TTR:  55.0 % (1 y)   Target end date:  Indefinite   Send INR reminders " to:  [facility]    Indications    DVT (deep venous thrombosis) (H) [I82.409]  Factor V Leiden (H) [D68.51]  History of deep venous thrombosis [Z86.718]  Long term current use of anticoagulants with INR goal of 2.0-3.0 [Z79.01]  Acute deep vein thrombosis (DVT) of right lower extremity  unspecified vein (H) [I82.401]           Comments:  [employer] meter- Wants a call every result         Anticoagulation Care Providers     Provider Role Specialty Phone number    Jimi Wan MD Referring Internal Medicine [phone]    Chaka Martinez MD Referring Internal Medicine [phone]    Savannah Moe MD Referring Internal Medicine [phone]

## 2022-06-13 NOTE — PROGRESS NOTES
ANTICOAGULATION MANAGEMENT     Susan Ferguson 82 year old female is on warfarin with therapeutic INR result. (Goal INR 2.0-3.0)    Recent labs: (last 7 days)     06/13/22  0000   INR 2.1       ASSESSMENT       Source(s): Chart Review and Patient/Caregiver Call       Warfarin doses taken: Warfarin taken as instructed    Diet: pt usually eats greens daily. Pt stated she did not eat ANY greens this past week. pt would like to incorporate greens back into diet starting today    New illness, injury, or hospitalization: No    Medication/supplement changes: None noted    Signs or symptoms of bleeding or clotting: No    Previous INR: Subtherapeutic    Additional findings: None       PLAN     Recommended plan for ongoing change(s) affecting INR     Dosing Instructions: Increase your warfarin dose (5.3% change) with next INR in 1 week       Summary  As of 6/13/2022    Full warfarin instructions:  5 mg every Sun; 7.5 mg all other days   Next INR check:  6/20/2022             Telephone call with Susan who verbalizes understanding and agrees to plan and who agrees to plan and repeated back plan correctly    Patient to recheck with home meter    Education provided: Please call back if any changes to your diet, medications or how you've been taking warfarin and Importance of consistent vitamin K intake    Plan made with [facility] Pharmacist Maine Catherine, RN  Anticoagulation Clinic  6/13/2022    _______________________________________________________________________     Anticoagulation Episode Summary     Current INR goal:  2.0-3.0   TTR:  53.7 % (1 y)   Target end date:  Indefinite   Send INR reminders to:  [facility]    Indications    DVT (deep venous thrombosis) (H) [I82.409]  Factor V Leiden (H) [D68.51]  History of deep venous thrombosis [Z86.718]  Long term current use of anticoagulants with INR goal of 2.0-3.0 [Z79.01]  Acute deep vein thrombosis (DVT) of right lower extremity  unspecified vein (H)  [I82.401]           Comments:  [employer] meter- Wants a call every result         Anticoagulation Care Providers     Provider Role Specialty Phone number    Jimi Wan MD Referring Internal Medicine [phone]    Chaka Martinez MD Referring Internal Medicine [phone]    Savannah Moe MD Referring Internal Medicine [phone]

## 2022-06-27 NOTE — PROGRESS NOTES
ANTICOAGULATION MANAGEMENT     Susan Ferguson 82 year old female is on warfarin with supratherapeutic INR result. (Goal INR 2.0-3.0)    Recent labs: (last 7 days)     06/27/22  0000   INR 3.4*       ASSESSMENT       Source(s): Chart Review and Patient/Caregiver Call       Warfarin doses taken: Less warfarin taken than instructed. Pt took 47.5mg/week the lat two weeks.    Diet: 4 prunes/day for constipation. Should have minimal impact on INR. Eating baseline amount of greens. 2-3 servings/week.    2-3 protein drinks/week. Had been up to 4 at one point.    New illness, injury, or hospitalization: Chemo at [facility]. Per micromedex, bevacizumab should not impact INR.     Medication/supplement changes: None noted    Signs or symptoms of bleeding or clotting: No    Previous INR: Therapeutic last visit; previously outside of goal range    Additional findings: None       PLAN     Recommended plan for no diet, medication or health factor changes affecting INR     Dosing Instructions: decrease your warfarin dose (11% change) with next INR in 1 week   (47.5mg/week to 42.5mg/week is a 11% decrease).    Summary  As of 6/27/2022    Full warfarin instructions:  7.5 mg every Tue, Thu, Sat; 5 mg all other days   Next INR check:  7/5/2022             Telephone call with Susan who verbalizes understanding and agrees to plan    Patient to recheck with home meter    Education provided: Goal range and significance of current result    Plan made with [facility] Pharmacist Mabel Martinez RN  Anticoagulation Clinic  6/27/2022    _______________________________________________________________________     Anticoagulation Episode Summary     Current INR goal:  2.0-3.0   TTR:  55.6 % (1 y)   Target end date:  Indefinite   Send INR reminders to:  YARI AMIN    Indications    DVT (deep venous thrombosis) (H) [I82.409]  Factor V Leiden (H) [D68.51]  History of deep venous thrombosis [Z86.718]  Long term current use of  anticoagulants with INR goal of 2.0-3.0 [Z79.01]  Acute deep vein thrombosis (DVT) of right lower extremity  unspecified vein (H) [I82.401]           Comments:  [employer] meter- Wants a call every result         Anticoagulation Care Providers     Provider Role Specialty Phone number    Jimi Wan MD Referring Internal Medicine [phone]    Chaka Martinez MD Referring Internal Medicine [phone]    Savannah Moe MD Referring Internal Medicine [phone]

## 2022-06-30 NOTE — TELEPHONE ENCOUNTER
APPT NOTES: Hyperthyroidism, referred by Dr. Savannah Moe    For Cancer Patients: Need the original operative and surgical pathology reports and all imaging reports/images related to the disease (includes all thyroid US, nuclear thyroid and total body scans, PET scans, chest CT reports since prior to the diagnosis ).   APPT DATE: 8/2/2022   NOTES (FOR ALL VISITS) STATUS DETAILS   OFFICE NOTES from referring provider [facility]  [place]:  2/28/22 - PCC OV with Dr. Moe   OFFICE NOTES from other specialist Care Everywhere / Internal [facility]:  7/6/22 - ONC OV with Dr. Mai  6/16/22 - PALL OV with Dr. Valencia  6/2/22 - ONC OV with Dr. Mehdi Cassidy - [place]:  7/29/20 - PCC OV with Dr. Wan  1/16/18 - PCC OV with Dr. Vela   ED NOTES Care Everywhere [facility]:  9/25/20 - Admission with Dr. Rick   OPERATIVE REPORT  (thyroid, pituitary, adrenal, parathyroid)  (All op notes related to diagnoses) N/A    MEDICATION LIST Care Everywhere    IMAGING      DEXASCAN Internal [facility]:  2/28/22 - DEXA  11/14/14 - DEXA   CT (HEAD/NECK/CHEST/ABDOMEN) Received / [facility]:  5/5/22 - CT Chest  5/5/22 - CT Abd/Pelvis  3/10/22 - CT Chest  3/10/22 - CT Abd/Pelvis  1/13/22 - CT Abd/Pelvis  1/13/22 - CT Chest  10/27/21 - CT Abd/Pelvis  10/27/21 - CT Chest  8/18/21 - CT Chest  8/18/21 - CT Abd/Pelvis  1/9/21 - CT Neck  1/9/21 - CT Head  9/25/20 - CT Head    [facility]:  9/17/20 - CT Abd/Pelvis   LABS     DIABETES: HBGA1C, CREATININE, FASTING LIPIDS, MICROALBUMIN URINE, POTASSIUM, TSH, T4    THYROID: TSH, T4, CBC, THYRODLONULIN, TOTAL T3, FREE T4, CALCITONIN, CEA Care Everywhere / Internal [facility]:  6/2/22 -CBC  6/2/22 - CMP  5/12/22 - Urine Analysis  5/5/22 - Creatinine  6/17/21 - Potassium  9/29/20 - BMP  9/25/20 - Calcium    [facility]:  2/28/22 - Vitamin D  2/28/22- TSH, T4  2/28/22 - Thyroid Peroxidase  9/17/20 - Routine UA  7/29/20 - Lipid     Records Requested  03/01/22    Facility  [facility]  Fax: [phone]   Outcome * 3/1/22 4:06 PM  Faxed req to [place] for images to be pushed to [place] PACs. - [employer]    * 3/23/22 8:17 AM Images received from [place] and attached to the patient in PACs. - [employer]    * 4/22/22 8:29 AM Faxed urg req to [place] for images to be pushed to [place] PACS. - [employer]    * 5/2/22 10:09 AM Additional images received from [place] and attached to the patient in PACs. - [employer]    * 6/30/22 6:27 AM Faxed urg req to [place] for updated images to be pushed to [place] PACs. - [employer]    * 7/13/22 10:48 AM Images received from [place] and attached to the patient in PACs. - [employer]

## 2022-07-12 NOTE — PROGRESS NOTES
ANTICOAGULATION MANAGEMENT     Susan Ferguson 82 year old female is on warfarin with subtherapeutic INR result. (Goal INR 2.0-3.0)    Recent labs: (last 7 days)     07/12/22  0000   INR 1.5*       ASSESSMENT       Source(s): Chart Review and Patient/Caregiver Call       Warfarin doses taken: Warfarin taken as instructed    Diet: Prunes; 2 in am and 2 in pm for constipation; Boost Protein drkinks 1 can daily    New illness, injury, or hospitalization: No    Medication/supplement changes: None noted    Signs or symptoms of bleeding or clotting: No    Previous INR: Supratherapeutic    Additional findings: Chemo.  Constipated from Chemo so uses prunes which works well for her. Also lost 30 lbs since January so taking Boost drinks daily now.        PLAN     Recommended plan for ongoing change(s) affecting INR     Dosing Instructions: booster dose then Increase your warfarin dose (6% change) with next INR in 2 weeks       Summary  As of 7/12/2022    Full warfarin instructions:  7/12: 10 mg; Otherwise 5 mg every Mon, Wed, Fri; 7.5 mg all other days   Next INR check:  7/26/2022             Telephone call with Susan who agrees to plan and repeated back plan correctly    Patient to recheck with home meter    Education provided: Please call back if any changes to your diet, medications or how you've been taking warfarin, Importance of consistent vitamin K intake, Impact of vitamin K foods on INR, Vitamin K content of foods, Monitoring for bleeding signs and symptoms, Monitoring for clotting signs and symptoms and When to seek medical attention/emergency care    Plan made per [facility] anticoagulation protocol    Hortencia Nava, RN  Anticoagulation Clinic  7/12/2022    _______________________________________________________________________     Anticoagulation Episode Summary     Current INR goal:  2.0-3.0   TTR:  57.8 % (1 y)   Target end date:  Indefinite   Send INR reminders to:  YARI AMIN    Indications    DVT (deep  venous thrombosis) (H) [I82.409]  Factor V Leiden (H) [D68.51]  History of deep venous thrombosis [Z86.718]  Long term current use of anticoagulants with INR goal of 2.0-3.0 [Z79.01]  Acute deep vein thrombosis (DVT) of right lower extremity  unspecified vein (H) [I82.401]           Comments:  [employer] meter- Wants a call every result         Anticoagulation Care Providers     Provider Role Specialty Phone number    Jimi Wan MD Referring Internal Medicine [phone]    Chaka Martinez MD Referring Internal Medicine [phone]    Savannah Moe MD Referring Internal Medicine [phone]

## 2022-07-19 NOTE — TELEPHONE ENCOUNTER
Results/Records from [place] Faxed to Medical Records. Emailed to provider.   Rosie Mayen RN on 7/19/2022 at 1:42 PM

## 2022-07-19 NOTE — TELEPHONE ENCOUNTER
Results/records from [place] faxed to Medical records team  And emailed to provider.   Rosie Mayen RN on 7/19/2022 at 1:27 PM

## 2022-07-26 NOTE — TELEPHONE ENCOUNTER
ANTICOAGULATION MANAGEMENT     Susan Ferguson 82 year old female is on warfarin with supratherapeutic INR result. (Goal INR 2.0-3.0 )    Recent labs: (last 7 days)     07/26/22  0000   INR 4.6*       ASSESSMENT       Source(s): Chart Review    Previous INR was Subtherapeutic    Medication, diet, health changes since last INR chart reviewed; none identified           PLAN     Unable to reach Susan today.    Left message to hold warfarin tonight. Request call back for assessment.    Follow up required to confirm warfarin dose taken and assess for changes, discuss out of range result  and discuss dosing instructions and confirm understanding of instructions    Martha Ugalde RN  Anticoagulation Clinic  7/26/2022

## 2022-07-26 NOTE — TELEPHONE ENCOUNTER
ANTICOAGULATION MANAGEMENT     Susan Ferguson 82 year old female is on warfarin with supratherapeutic INR result. (Goal INR 2.0-3.0 )    Recent labs: (last 7 days)     07/26/22  0000   INR 4.6*       ASSESSMENT       Source(s): Chart Review and Patient/Caregiver Call       Warfarin doses taken: Warfarin taken as instructed    Diet: No new diet changes identified    New illness, injury, or hospitalization: No    Medication/supplement changes: Lisinopril started 6 weeks ago No interaction anticipated    Signs or symptoms of bleeding or clotting: No    Previous INR: Subtherapeutic    Additional findings: Continues chemo       PLAN     Recommended plan for no diet, medication or health factor changes affecting INR     Dosing Instructions: hold 2 doses then decrease your warfarin dose (11% change) with next INR in 3 days       Summary  As of 7/26/2022    Full warfarin instructions:  7/26: Hold; 7/27: Hold; Otherwise 7.5 mg every Tue, Thu; 5 mg all other days   Next INR check:  7/29/2022             Telephone call with Susan who verbalizes understanding and agrees to plan    Patient to recheck with home meter    Education provided: Impact of vitamin K foods on INR, Goal range and significance of current result, Monitoring for bleeding signs and symptoms and Contact [phone]  with any changes, questions or concerns.     Plan made per [facility] anticoagulation protocol    Martha Ugalde RN  Anticoagulation Clinic  7/26/2022    _______________________________________________________________________     Anticoagulation Episode Summary     Current INR goal:  2.0-3.0   TTR:  59.0 % (1 y)   Target end date:  Indefinite   Send INR reminders to:  [facility]    Indications    DVT (deep venous thrombosis) (H) [I82.409]  Factor V Leiden (H) [D68.51]  History of deep venous thrombosis [Z86.718]  Long term current use of anticoagulants with INR goal of 2.0-3.0 [Z79.01]  Acute deep vein thrombosis (DVT) of right lower  extremity  unspecified vein (H) [I82.401]           Comments:  [employer] meter- Wants a call every result         Anticoagulation Care Providers     Provider Role Specialty Phone number    Jimi Wan MD Referring Internal Medicine [phone]    Chaka Martinez MD Referring Internal Medicine [phone]    Savannah Moe MD Referring Internal Medicine [phone]

## 2022-07-26 NOTE — TELEPHONE ENCOUNTER
Reason for Call:  Other INR    Detailed comments: Patient being seen at [facility] today & had her INR done today with a result of 4.6. Please call after 4pm as she is [place] now & doesn't get good reception.    Phone Number Patient can be reached at: Cell number on file:    Telephone Information:   Mobile [phone]       Best Time: after 4pm     Can we leave a detailed message on this number? YES    Call taken on 7/26/2022 at 2:35 PM by More Rodríguez

## 2022-07-29 NOTE — PROGRESS NOTES
ANTICOAGULATION MANAGEMENT     Susan Ferguson 82 year old female is on warfarin with subtherapeutic INR result. (Goal INR 2.0-3.0)    Recent labs: (last 7 days)     07/29/22  0000   INR 1*       ASSESSMENT       Source(s): Chart Review and Patient/Caregiver Call       Warfarin doses taken: Warfarin taken as instructed    Diet: notes she was told by Martha that the prunes will effect INR. So she stopped. patient also got strawberry boost which she really likes and has drank one daily since Sunday.    New illness, injury, or hospitalization: Yes: constipation    Medication/supplement changes: lisinopril increased to 20 mg daily, taking chemo oral medication daily. Cyclophosphamide, started chemo 3 weeks ago.    Signs or symptoms of bleeding or clotting: No    Previous INR: Supratherapeutic    Additional findings: None       PLAN     Recommended plan for temporary change(s) affecting INR     Dosing Instructions: booster dose then Increase your warfarin dose (6.2% change) with next INR in 3 days       Summary  As of 7/29/2022    Full warfarin instructions:  7/29: 10 mg; Otherwise 7.5 mg every Tue, Thu, Sat; 5 mg all other days   Next INR check:  8/1/2022             Telephone call with Susan who verbalizes understanding and agrees to plan    Patient to recheck with home meter    Education provided: talked with the patient after the huddle with Alicia García [facility]. Susan will go back to eating 6 prunes per day. She will continue with the daily boost drink and notify the clinic if the amount changes. She notes that she did a lab comparison test one month ago and the home meter got the exact reading the clinic did. 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 She also notes changing the chip with every new bottle of strips and that they verify what comes up matches the box.    Plan made with [facility] Pharmacist Alicia Lan, RN  Anticoagulation  Clinic  7/29/2022    _______________________________________________________________________     Anticoagulation Episode Summary     Current INR goal:  2.0-3.0   TTR:  58.7 % (1 y)   Target end date:  Indefinite   Send INR reminders to:  [facility]    Indications    DVT (deep venous thrombosis) (H) [I82.409]  Factor V Leiden (H) [D68.51]  History of deep venous thrombosis [Z86.718]  Long term current use of anticoagulants with INR goal of 2.0-3.0 [Z79.01]  Acute deep vein thrombosis (DVT) of right lower extremity  unspecified vein (H) [I82.401]           Comments:  [employer] meter- Wants a call every result; homozygous Factor V         Anticoagulation Care Providers     Provider Role Specialty Phone number    Jimi Wan MD Referring Internal Medicine [phone]    Chaka Martinez MD Referring Internal Medicine [phone]    Savannah Moe MD Referring Internal Medicine [phone]

## 2022-08-01 NOTE — PROGRESS NOTES
ANTICOAGULATION MANAGEMENT     Susan Ferguson 82 year old female is on warfarin with therapeutic INR result. (Goal INR 2.0-3.0)    Recent labs: (last 7 days)     08/01/22  0000   INR 2    Per patient INR result is 2. 6 not 2.0 as reported by home monitor company    ASSESSMENT       Source(s): Chart Review and Patient/Caregiver Call       Warfarin doses taken: Warfarin taken as instructed    Diet: Still drinking boost daily but cut back prunes from 3 in the AM and 3 in the evening to just 3 in the evening - per patient she is drinking more water     New illness, injury, or hospitalization: No    Medication/supplement changes: None noted    Signs or symptoms of bleeding or clotting: No    Previous INR: Subtherapeutic    Additional findings: Have an appointment at [place] on 8/16- will plan to have INR check during that visit and then have the patient check her INR with her machine to compare result. Per patient she will bring her machine on that day and will check INR as well.    Will consult PhramD due to rapid rise in INR of 0.4/day       PLAN     Recommended plan for temporary change(s) affecting INR     Dosing Instructions: partial hold then continue your current warfarin dose with next INR in 1 week       Summary  As of 8/1/2022    Full warfarin instructions:  8/2: 5 mg; Otherwise 7.5 mg every Tue, Thu, Sat; 5 mg all other days   Next INR check:  8/8/2022             Telephone call with Susan who verbalizes understanding and agrees to plan and who agrees to plan and repeated back plan correctly    Patient to recheck with home meter    Education provided: Importance of therapeutic range, Importance of following up at instructed interval, Importance of taking warfarin as instructed and Contact [phone]  with any changes, questions or concerns.     Plan made with [facility] Pharmacist Alicia Cabrera RN  Anticoagulation  Clinic  8/1/2022    _______________________________________________________________________     Anticoagulation Episode Summary     Current INR goal:  2.0-3.0   TTR:  58.0 % (1 y)   Target end date:  Indefinite   Send INR reminders to:  [facility]    Indications    DVT (deep venous thrombosis) (H) [I82.409]  Factor V Leiden (H) [D68.51]  History of deep venous thrombosis [Z86.718]  Long term current use of anticoagulants with INR goal of 2.0-3.0 [Z79.01]  Acute deep vein thrombosis (DVT) of right lower extremity  unspecified vein (H) [I82.401]           Comments:  [employer] meter- Wants a call every result; homozygous Factor V         Anticoagulation Care Providers     Provider Role Specialty Phone number    Jimi Wan MD Referring Internal Medicine [phone]    Chaka Martinez MD Referring Internal Medicine [phone]    Savannah Moe MD Referring Internal Medicine [phone]

## 2022-08-05 NOTE — TELEPHONE ENCOUNTER
Chart notes received from [place]. Labeled to scan.   Porsche Mckeon notified.   Rosie Mayen RN on 8/5/2022 at 3:30 PM

## 2022-08-09 NOTE — PROGRESS NOTES
ANTICOAGULATION MANAGEMENT     Susan Ferguson 82 year old female is on warfarin with therapeutic INR result. (Goal INR 2.0-3.0)    Recent labs: (last 7 days)     08/09/22  0000   INR 2.6       ASSESSMENT       Source(s): Chart Review and Patient/Caregiver Call       Warfarin doses taken: Warfarin taken as instructed    Diet: No new diet changes identified    New illness, injury, or hospitalization: No    Medication/supplement changes: None noted    Signs or symptoms of bleeding or clotting: No    Previous INR: Therapeutic last visit; previously outside of goal range    Additional findings: Pt will be bringing her home meter into her appointment at [place] next Tuesday. She will do a poct INR as well as have a venous INR drawn to compare results.        PLAN     Recommended plan for no diet, medication or health factor changes affecting INR     Dosing Instructions: Continue your current warfarin dose are you have taken in the last week with partial hold (5.9% decrease from previous MD) with next INR in 1 week       Summary  As of 8/9/2022    Full warfarin instructions:  7.5 mg every Thu, Sat; 5 mg all other days   Next INR check:  8/16/2022             Telephone call with Susan who agrees to plan and repeated back plan correctly    Patient to recheck with home meter    Education provided: Goal range and significance of current result, Importance of therapeutic range and Importance of taking warfarin as instructed    Plan made per [facility] anticoagulation protocol    Marian Irving RN  Anticoagulation Clinic  8/9/2022    _______________________________________________________________________     Anticoagulation Episode Summary     Current INR goal:  2.0-3.0   TTR:  58.0 % (1 y)   Target end date:  Indefinite   Send INR reminders to:  [facility]    Indications    DVT (deep venous thrombosis) (H) [I82.409]  Factor V Leiden (H) [D68.51]  History of deep venous thrombosis [Z86.718]  Long term current use of  anticoagulants with INR goal of 2.0-3.0 [Z79.01]  Acute deep vein thrombosis (DVT) of right lower extremity  unspecified vein (H) [I82.401]           Comments:  [employer] meter- Wants a call every result; homozygous Factor V         Anticoagulation Care Providers     Provider Role Specialty Phone number    Jimi Wan MD Referring Internal Medicine [phone]    Chaka Martinez MD Referring Internal Medicine [phone]    Savannah Moe MD Referring Internal Medicine [phone]

## 2022-08-23 NOTE — PROGRESS NOTES
ANTICOAGULATION MANAGEMENT     Susan Ferguson 82 year old female is on warfarin with therapeutic INR result. (Goal INR 2.0-3.0)    Recent labs: (last 7 days)     08/23/22  0000   INR 2.3       ASSESSMENT       Source(s): Chart Review and Patient/Caregiver Call       Warfarin doses taken: Warfarin taken as instructed    Diet: No new diet changes identified    New illness, injury, or hospitalization: No    Medication/supplement changes: None noted    Signs or symptoms of bleeding or clotting: No    Previous INR: Therapeutic last 2(+) visits    Additional findings: States that she had a couple episodes of diarrhea after being constipated, working on finding balance. Now eating 3 prunes daily vs 6 prunes daily prior to help with management of constipation.       PLAN     Recommended plan for no diet, medication or health factor changes affecting INR     Dosing Instructions: Continue your current warfarin dose with next INR in 2 weeks       Summary  As of 8/23/2022    Full warfarin instructions:  7.5 mg every Thu, Sat; 5 mg all other days   Next INR check:  9/6/2022             Telephone call with Susan who verbalizes understanding and agrees to plan    Patient to recheck with home meter    Education provided: Importance of consistent vitamin K intake, Impact of vitamin K foods on INR, Goal range and significance of current result and Importance of therapeutic range    Plan made per [facility] anticoagulation protocol    Kristopher Justice RN  Anticoagulation Clinic  8/23/2022    _______________________________________________________________________     Anticoagulation Episode Summary     Current INR goal:  2.0-3.0   TTR:  61.2 % (1 y)   Target end date:  Indefinite   Send INR reminders to:  [facility]    Indications    DVT (deep venous thrombosis) (H) [I82.409]  Factor V Leiden (H) [D68.51]  History of deep venous thrombosis [Z86.718]  Long term current use of anticoagulants with INR goal of 2.0-3.0 [Z79.01]  Acute deep vein  thrombosis (DVT) of right lower extremity  unspecified vein (H) [I82.401]           Comments:  [employer] meter- Wants a call every result; homozygous Factor V         Anticoagulation Care Providers     Provider Role Specialty Phone number    Jimi Wan MD Referring Internal Medicine [phone]    Chaka Martinez MD Referring Internal Medicine [phone]    Savannah Moe MD Referring Internal Medicine [phone]

## 2022-09-07 NOTE — TELEPHONE ENCOUNTER
ANTICOAGULATION MANAGEMENT:  Medication Refill    Anticoagulation Summary  As of 8/23/2022    Warfarin maintenance plan:  7.5 mg (5 mg x 1.5) every Thu, Sat; 5 mg (5 mg x 1) all other days   Next INR check:  9/6/2022   Target end date:  Indefinite    Indications    DVT (deep venous thrombosis) (H) [I82.409]  Factor V Leiden (H) [D68.51]  History of deep venous thrombosis [Z86.718]  Long term current use of anticoagulants with INR goal of 2.0-3.0 [Z79.01]  Acute deep vein thrombosis (DVT) of right lower extremity  unspecified vein (H) [I82.401]             Anticoagulation Care Providers     Provider Role Specialty Phone number    Jimi Wan MD Referring Internal Medicine [phone]    Chaka Martinez MD Referring Internal Medicine [phone]    Savannah Moe MD Referring Internal Medicine [phone]          Visit with referring provider/group within last year: Yes    [facility] referral signed within last year: Yes    Susan meets all criteria for refill (current [facility] referral, office visit with referring provider/group in last year, lab monitoring up to date or not exceeding 2 weeks overdue). Rx instructions and quantity supplied updated to match patient's current dosing plan. Warfarin 90 day supply with 1 refill granted per [facility] protocol     Mckenzie Virk RN  Anticoagulation Clinic

## 2022-09-08 NOTE — PROGRESS NOTES
ANTICOAGULATION MANAGEMENT     Susan Ferguson 82 year old female is on warfarin with subtherapeutic INR result. (Goal INR 2.0-3.0)    Recent labs: (last 7 days)     09/08/22  0000   INR 1.8*       ASSESSMENT       Source(s): Chart Review and Patient/Caregiver Call       Warfarin doses taken: Warfarin taken as instructed    Diet: No new diet changes identified    New illness, injury, or hospitalization: No    Medication/supplement changes: None noted    Signs or symptoms of bleeding or clotting: No    Previous INR: Therapeutic last 2(+) visits    Additional findings: None       PLAN     Recommended plan for no diet, medication or health factor changes affecting INR     Dosing Instructions: Continue your current warfarin dose with next INR in 12 days    Summary  As of 9/8/2022    Full warfarin instructions:  7.5 mg every Mon, Thu; 5 mg all other days   Next INR check:  9/20/2022             Telephone call with Susan who verbalizes understanding and agrees to plan    Patient to recheck with home meter    Education provided: Goal range and significance of current result    Plan made per [facility] anticoagulation protocol    Roly Martinez RN  Anticoagulation Clinic  9/8/2022    _______________________________________________________________________     Anticoagulation Episode Summary     Current INR goal:  2.0-3.0   TTR:  63.8 % (1 y)   Target end date:  Indefinite   Send INR reminders to:  [facility]    Indications    DVT (deep venous thrombosis) (H) [I82.409]  Factor V Leiden (H) [D68.51]  History of deep venous thrombosis [Z86.718]  Long term current use of anticoagulants with INR goal of 2.0-3.0 [Z79.01]  Acute deep vein thrombosis (DVT) of right lower extremity  unspecified vein (H) [I82.401]           Comments:  [employer] meter- Wants a call every result; homozygous Factor V         Anticoagulation Care Providers     Provider Role Specialty Phone number    Jimi Wan MD Referring Internal Medicine  [phone]    Chaka Martinez MD Referring Internal Medicine [phone]    Savannah Moe MD Referring Internal Medicine [phone]

## 2022-09-26 NOTE — PROGRESS NOTES
"ANTICOAGULATION MANAGEMENT     Susan Ferguson 82 year old female is on warfarin with subtherapeutic INR result. (Goal INR 2.0-3.0)    Recent labs: (last 7 days)     09/26/22  0000   INR 1.6*       ASSESSMENT       Source(s): Chart Review and Patient/Caregiver Call       Warfarin doses taken: Warfarin taken as instructed    Diet: Per 9/16/22 visit notes, \"She has committed to once a day Ensure that has 30 grams of protein.\" Patient confirms that she has started this daily about ~2-3 weeks ago    New illness, injury, or hospitalization: Yes: Constipation has resolved per patient    Medication/supplement changes: None noted    Signs or symptoms of bleeding or clotting: No    Previous INR: Subtherapeutic    Additional findings: None       PLAN     Recommended plan for ongoing change(s) affecting INR     Dosing Instructions: Increase your warfarin dose (12.5% change) with next INR in 1 week   (Patient reports she will try this dose but is concerned it may be too much/week. Advised that with increase in Vitamin K, more warfarin is needed to stay therapeutic, no boost given due to this)    Summary  As of 9/26/2022    Full warfarin instructions:  5 mg every Sun, Wed, Fri; 7.5 mg all other days   Next INR check:               Telephone call with Susan who verbalizes understanding and agrees to plan    Patient to recheck with home meter    Education provided: Please call back if any changes to your diet, medications or how you've been taking warfarin, Importance of consistent vitamin K intake and Impact of vitamin K foods on INR    Plan made per [facility] anticoagulation protocol    Seda Sanchez RN  Anticoagulation Clinic  9/26/2022    _______________________________________________________________________     Anticoagulation Episode Summary     Current INR goal:  2.0-3.0   TTR:  60.3 % (1 y)   Target end date:  Indefinite   Send INR reminders to:  [facility]    Indications    DVT (deep venous thrombosis) (H) " [I82.409]  Factor V Leiden (H) [D68.51]  History of deep venous thrombosis [Z86.718]  Long term current use of anticoagulants with INR goal of 2.0-3.0 [Z79.01]  Acute deep vein thrombosis (DVT) of right lower extremity  unspecified vein (H) [I82.401]           Comments:  [employer] meter- Wants a call every result; homozygous Factor V         Anticoagulation Care Providers     Provider Role Specialty Phone number    Jimi Wan MD Referring Internal Medicine [phone]    Chaka Martinez MD Referring Internal Medicine [phone]    Savannah Moe MD Referring Internal Medicine [phone]

## 2022-10-03 NOTE — PROGRESS NOTES
ANTICOAGULATION MANAGEMENT     Susan Ferguson 82 year old female is on warfarin with therapeutic INR result. (Goal INR 2.0-3.0)    Recent labs: (last 7 days)     10/02/22  0000   INR 2       ASSESSMENT       Source(s): Chart Review and Patient/Caregiver Call       Warfarin doses taken: Warfarin taken as instructed    Diet: No new diet changes identified, she is maintaining her daily Ensure.     New illness, injury, or hospitalization: No    Medication/supplement changes: None noted    Signs or symptoms of bleeding or clotting: No    Previous INR: Subtherapeutic    Additional findings: Susan still feeling concerned that this maintenance dose will be too high. Reassured that INR only increased to low end of therapeutic goal range today, recommended closer monitoring (5-7 days) if she is worried       PLAN     Recommended plan for no diet, medication or health factor changes affecting INR     Dosing Instructions: Continue your current warfarin dose with next INR in 1-2 weeks       Summary  As of 10/3/2022    Full warfarin instructions:  5 mg every Sun, Wed, Fri; 7.5 mg all other days   Next INR check:  10/17/2022             Telephone call with Susan who verbalizes understanding and agrees to plan    Patient to recheck with home meter    Education provided: Importance of consistent vitamin K intake and Goal range and significance of current result    Plan made per [facility] anticoagulation protocol    Carri Tran RN  Anticoagulation Clinic  10/3/2022    _______________________________________________________________________     Anticoagulation Episode Summary     Current INR goal:  2.0-3.0   TTR:  58.7 % (1 y)   Target end date:  Indefinite   Send INR reminders to:  [facility]    Indications    DVT (deep venous thrombosis) (H) [I82.409]  Factor V Leiden (H) [D68.51]  History of deep venous thrombosis [Z86.718]  Long term current use of anticoagulants with INR goal of 2.0-3.0 [Z79.01]  Acute deep vein  thrombosis (DVT) of right lower extremity  unspecified vein (H) [I82.401]           Comments:  [employer] meter- Wants a call every result; homozygous Factor V         Anticoagulation Care Providers     Provider Role Specialty Phone number    Jimi Wan MD Referring Internal Medicine [phone]    Chaka Martinez MD Referring Internal Medicine [phone]    Savannah Moe MD Referring Internal Medicine [phone]

## 2022-10-20 NOTE — PROGRESS NOTES
ANTICOAGULATION MANAGEMENT     Susan Ferguson 82 year old female is on warfarin with therapeutic INR result. (Goal INR 2.0-3.0)    Recent labs: (last 7 days)     10/20/22  0000   INR 2.3       ASSESSMENT       Source(s): Chart Review and Patient/Caregiver Call       Warfarin doses taken: Warfarin taken as instructed    Diet: No new diet changes identified - continues with protein shakes, she states one has vit k and one does not, she alternates between these    New illness, injury, or hospitalization:  Ovarian cancer - patient states she was told at [facility] she likely has ~7 weeks of life left but she is remaining hopeful.     Medication/supplement changes: None noted    Signs or symptoms of bleeding or clotting: No    Previous INR: Therapeutic last visit; previously outside of goal range    Additional findings: None       PLAN     Recommended plan for ongoing change(s) affecting INR     Dosing Instructions: Continue your current warfarin dose with next INR in 2 weeks       Summary  As of 10/20/2022    Full warfarin instructions:  5 mg every Sun, Wed, Fri; 7.5 mg all other days; Starting 10/20/2022   Next INR check:  11/3/2022             Telephone call with Susan who verbalizes understanding and agrees to plan    Patient to recheck with home meter    Education provided:     Goal range and lab monitoring: goal range and significance of current result    Plan made per [facility] anticoagulation protocol    Marian Irving RN  Anticoagulation Clinic  10/20/2022    _______________________________________________________________________     Anticoagulation Episode Summary     Current INR goal:  2.0-3.0   TTR:  58.8 % (1 y)   Target end date:  Indefinite   Send INR reminders to:  [facility]    Indications    DVT (deep venous thrombosis) (H) [I82.409]  Factor V Leiden (H) [D68.51]  History of deep venous thrombosis [Z86.718]  Long term current use of anticoagulants with INR goal of 2.0-3.0 [Z79.01]  Acute  deep vein thrombosis (DVT) of right lower extremity  unspecified vein (H) [I82.401]           Comments:  [employer]- Wants a call every result; homozygous Factor V         Anticoagulation Care Providers     Provider Role Specialty Phone number    Jimi Wan MD Referring Internal Medicine [phone]    Chaka Martinez MD Referring Internal Medicine [phone]    Savannah Moe MD Referring Internal Medicine [phone]

## 2022-11-09 NOTE — PROGRESS NOTES
"ANTICOAGULATION MANAGEMENT     Susan Ferguson 82 year old female is on warfarin with supratherapeutic INR result. (Goal INR 2.0-3.0)    Recent labs: (last 7 days)     11/09/22  0000   INR 8*       ASSESSMENT       Source(s): Chart Review and Patient/Caregiver Call       Warfarin doses taken: Pt held yesterday after getting \"error\" messages on her meter    Diet: Eating about half (or less) of what she usually does. She attributes this to being constipated.    New illness, injury, or hospitalization: No    Medication/supplement changes: None noted    Signs or symptoms of bleeding or clotting: No    Previous INR: Therapeutic last visit; previously outside of goal range    Additional findings: Pt has malignant neoplasm of ovary. Following with oncology.       PLAN     Recommended plan for temporary change(s) and ongoing change(s) affecting INR     Dosing Instructions: hold dose then decrease your warfarin dose (17% change) with next INR in 1 day       Summary  As of 11/9/2022    Full warfarin instructions:  11/9: Hold; Otherwise 7.5 mg every Tue; 5 mg all other days; Starting 11/9/2022   Next INR check:  11/10/2022             Telephone call with Susan who verbalizes understanding and agrees to plan    Lab visit scheduled    Education provided:     Goal range and lab monitoring: goal range and significance of current result    Symptom monitoring: monitoring for bleeding signs and symptoms and when to seek medical attention/emergency care    Plan made per [facility] anticoagulation protocol    Roly Martinez RN  Anticoagulation Clinic  11/9/2022    _______________________________________________________________________     Anticoagulation Episode Summary     Current INR goal:  2.0-3.0   TTR:  54.0 % (1 y)   Target end date:  Indefinite   Send INR reminders to:  [facility]AG [facility]IT[facility]    Indications    DVT (deep venous thrombosis) (H) [I82.409]  Factor V Leiden (H) [D68.51]  History of deep venous thrombosis " [Z86.718]  Long term current use of anticoagulants with INR goal of 2.0-3.0 [Z79.01]  Acute deep vein thrombosis (DVT) of right lower extremity  unspecified vein (H) [I82.401]           Comments:  [employer] [facility] meter- Wants a call every result; homozygous Factor V         Anticoagulation Care Providers     Provider Role Specialty Phone number    Jimi Wan MD Referring Internal Medicine [phone]    Chaka Martinez MD Referring Internal Medicine [phone]    Savannah Moe MD Referring Internal Medicine [phone]

## 2022-11-10 NOTE — PROGRESS NOTES
ANTICOAGULATION MANAGEMENT     Susan Ferguson 82 year old female is on warfarin with supratherapeutic INR result. (Goal INR 2.0-3.0)    Recent labs: (last 7 days)     11/10/22  0958   INR  Venous INR 8.0*  5.43       ASSESSMENT       Source(s): Chart Review and Patient/Caregiver Call       Warfarin doses taken: Held for supratherapeutic  recently which may be affecting INR    Diet: Eating about half of what she usually does.    New illness, injury, or hospitalization: Diarrhea this morning after taking laxative last night.    Medication/supplement changes: None noted    Signs or symptoms of bleeding or clotting: No    Previous INR: Supratherapeutic    Additional findings: Pt has malignant neoplasm of ovary. Following with oncology.       PLAN     Recommended plan for no diet, medication or health factor changes affecting INR     Dosing Instructions: hold dose then continue your current warfarin dose with next INR in 1 day       Summary  As of 11/10/2022    Full warfarin instructions:  11/10: Hold; Otherwise 7.5 mg every Tue; 5 mg all other days; Starting 11/10/2022   Next INR check:  11/11/2022             Telephone call with Susan who verbalizes understanding and agrees to plan    Patient to recheck with home meter    Education provided:     Goal range and lab monitoring: goal range and significance of current result    Plan made per [facility] anticoagulation protocol    Roly Martinez RN  Anticoagulation Clinic  11/10/2022    _______________________________________________________________________     Anticoagulation Episode Summary     Current INR goal:  2.0-3.0   TTR:  53.7 % (1 y)   Target end date:  Indefinite   Send INR reminders to:  [facility]IT[facility]    Indications    DVT (deep venous thrombosis) (H) [I82.409]  Factor V Leiden (H) [D68.51]  History of deep venous thrombosis [Z86.718]  Long term current use of anticoagulants with INR goal of 2.0-3.0 [Z79.01]  Acute deep vein thrombosis (DVT) of right  lower extremity  unspecified vein (H) [I82.401]           Comments:  [employer] [facility] meter- Wants a call every result; homozygous Factor V         Anticoagulation Care Providers     Provider Role Specialty Phone number    Jimi Wan MD Referring Internal Medicine [phone]    Chaka Martinez MD Referring Internal Medicine [phone]    Savannah Moe MD Referring Internal Medicine [phone]

## 2022-11-11 NOTE — PROGRESS NOTES
ANTICOAGULATION MANAGEMENT     Susan Ferguson 82 year old female is on warfarin with supratherapeutic INR result. (Goal INR 2.0-3.0)    Recent labs: (last 7 days)     11/11/22  0000   INR 6*       ASSESSMENT       Source(s): Chart Review and Patient/Caregiver Call       Warfarin doses taken: Warfarin taken as instructed    Diet: Patient reports her appetite continues to be about 1/2 of what it normally is. Patient is still drinking ensure daily (this is her normal).    New illness, injury, or hospitalization: Patient had been constipated x5 days, then took miralax on 11/10/22 which caused an episode of diarrhea. Patient had a normal bowel movement today.    Medication/supplement changes: None noted    Signs or symptoms of bleeding or clotting: No    Previous INR: Supratherapeutic    Additional findings: Patient is currently being treated for ovarian cancer, was recently told she had 7 weeks of life left       PLAN     Recommended plan for temporary change(s) affecting INR     Dosing Instructions: hold 2 doses then decrease your warfarin dose (20% change) with next INR in 3 days       Summary  As of 11/11/2022    Full warfarin instructions:  11/11: Hold; 11/12: Hold; Otherwise 2.5 mg every Sun, Thu; 5 mg all other days; Starting 11/11/2022   Next INR check:  11/14/2022             Telephone call with Susan who verbalizes understanding and agrees to plan    Patient to recheck with home meter    Education provided:     Please call back if any changes to your diet, medications or how you've been taking warfarin    Symptom monitoring: monitoring for bleeding signs and symptoms, monitoring for clotting signs and symptoms and if you hit your head or have a bad fall seek emergency care    Contact [phone]  with any changes, questions or concerns.     Plan made with [facility] Pharmacist Alicia Smith RN  Anticoagulation  Clinic  11/11/2022    _______________________________________________________________________     Anticoagulation Episode Summary     Current INR goal:  2.0-3.0   TTR:  53.4 % (1 y)   Target end date:  Indefinite   Send INR reminders to:  ANTICOAG HOME [facility]IT[facility]    Indications    DVT (deep venous thrombosis) (H) [I82.409]  Factor V Leiden (H) [D68.51]  History of deep venous thrombosis [Z86.718]  Long term current use of anticoagulants with INR goal of 2.0-3.0 [Z79.01]  Acute deep vein thrombosis (DVT) of right lower extremity  unspecified vein (H) [I82.401]           Comments:  [employer] [facility] meter- Wants a call every result; homozygous Factor V         Anticoagulation Care Providers     Provider Role Specialty Phone number    Jimi Wan MD Referring Internal Medicine [phone]    Chaka Martinez MD Referring Internal Medicine [phone]    Savannah Moe MD Referring Internal Medicine [phone]

## 2022-11-14 NOTE — PROGRESS NOTES
ANTICOAGULATION MANAGEMENT     Susan Ferguson 82 year old female is on warfarin with supratherapeutic INR result. (Goal INR 2.0-3.0)    Recent labs: (last 7 days)     11/14/22  0000   INR 3.4*       ASSESSMENT       Source(s): Chart Review and Patient/Caregiver Call       Warfarin doses taken: Warfarin taken as instructed    Diet: Continues to eat about half of what she normally does.      Now just drinking 1/2 ensure/day.    New illness, injury, or hospitalization: Oscillating between diarrhea and constipation.    Medication/supplement changes: None noted    Signs or symptoms of bleeding or clotting: No    Previous INR: Supratherapeutic    Additional findings: None       PLAN     Recommended plan for ongoing change(s) affecting INR     Dosing Instructions: hold dose then decrease your warfarin dose (8% change) with next INR in 2 days       Summary  As of 11/14/2022    Full warfarin instructions:  11/14: Hold; Otherwise 2.5 mg every Sun, Tue, Thu; 5 mg all other days; Starting 11/14/2022   Next INR check:  11/16/2022             Telephone call with Susan who verbalizes understanding and agrees to plan    Patient to recheck with home meter    Education provided:     Goal range and lab monitoring: goal range and significance of current result    Plan made per [facility] anticoagulation protocol    Roly Martinez RN  Anticoagulation Clinic  11/14/2022    _______________________________________________________________________     Anticoagulation Episode Summary     Current INR goal:  2.0-3.0   TTR:  52.5 % (1 y)   Target end date:  Indefinite   Send INR reminders to:  ANTICOAG HOME [facility]IT[facility]    Indications    DVT (deep venous thrombosis) (H) [I82.409]  Factor V Leiden (H) [D68.51]  History of deep venous thrombosis [Z86.718]  Long term current use of anticoagulants with INR goal of 2.0-3.0 [Z79.01]  Acute deep vein thrombosis (DVT) of right lower extremity  unspecified vein (H) [I82.401]           Comments:  [facility]  meter- Wants a call every result; homozygous Factor V         Anticoagulation Care Providers     Provider Role Specialty Phone number    Jimi Wan MD Referring Internal Medicine [phone]    Chaka Martinez MD Referring Internal Medicine [phone]    Savannah Moe MD Referring Internal Medicine [phone]

## 2022-11-16 NOTE — TELEPHONE ENCOUNTER
Reason for call:  Other   Patient called regarding (reason for call): call back  Additional comments: Patient is returning call from INR nurse.      Phone number to reach patient:  Cell [phone]    Best Time:  Anytime     Can we leave a detailed message on this number?  YES    Travel screening: Not Applicable

## 2022-11-16 NOTE — TELEPHONE ENCOUNTER
See [facility] encounter for details.  Hortencia Nava, RN  Anticoagulation Nurse - [facility], [place]

## 2022-11-16 NOTE — PROGRESS NOTES
ANTICOAGULATION MANAGEMENT     Susan Ferguson 82 year old female is on warfarin with therapeutic INR result. (Goal INR 2.0-3.0)    Recent labs: (last 7 days)     11/16/22  0000   INR 2.6       ASSESSMENT       Source(s): Chart Review and Patient/Caregiver Call       Warfarin doses taken: Warfarin taken as instructed    Diet: Appetite improving but not back to baseline    New illness, injury, or hospitalization: No    Medication/supplement changes: None noted    Signs or symptoms of bleeding or clotting: No    Previous INR: Supratherapeutic    Additional findings: Fights Constipation. No further episodes of diarrhea. Ovarian cancer       PLAN     Recommended plan for ongoing change(s) affecting INR     Dosing Instructions: decrease your warfarin dose (9% change) with next INR in 5 days       Summary  As of 11/16/2022    Full warfarin instructions:  5 mg every Mon, Wed, Fri; 2.5 mg all other days; Starting 11/16/2022   Next INR check:  11/21/2022             Telephone call with Susan who agrees to plan and repeated back plan correctly    Patient to recheck with home meter    Education provided:     Please call back if any changes to your diet, medications or how you've been taking warfarin    Plan made per [facility] anticoagulation protocol    Hortencia Nava, RN  Anticoagulation Clinic  11/16/2022    _______________________________________________________________________     Anticoagulation Episode Summary     Current INR goal:  2.0-3.0   TTR:  52.3 % (1 y)   Target end date:  Indefinite   Send INR reminders to:  ANTICOAG HOME MONITOR[facility]    Indications    DVT (deep venous thrombosis) (H) [I82.409]  Factor V Leiden (H) [D68.51]  History of deep venous thrombosis [Z86.718]  Long term current use of anticoagulants with INR goal of 2.0-3.0 [Z79.01]  Acute deep vein thrombosis (DVT) of right lower extremity  unspecified vein (H) [I82.401]           Comments:  [employer] [facility] meter- Wants a call every result; homozygous Factor V          Anticoagulation Care Providers     Provider Role Specialty Phone number    Jimi Wan MD Referring Internal Medicine [phone]    Chaka Martinez MD Referring Internal Medicine [phone]    Savannah Moe MD Referring Internal Medicine [phone]

## 2022-11-21 NOTE — PROGRESS NOTES
ANTICOAGULATION MANAGEMENT     Susan Ferguson 82 year old female is on warfarin with supratherapeutic INR result. (Goal INR 2.0-3.0)    Recent labs: (last 7 days)     11/21/22  0000   INR 3.1*       ASSESSMENT       Source(s): Chart Review and Patient/Caregiver Call       Warfarin doses taken: Warfarin taken as instructed    Diet: No new diet changes identified    New illness, injury, or hospitalization: Yes: stage 4 ovarian cancer. Lost a lot of weight. Stomach pain, constipation. Fluid in abdomin. Hurts to swallow.    Medication/supplement changes: None noted    Signs or symptoms of bleeding or clotting: No    Previous INR: Therapeutic last visit; previously outside of goal range    Additional findings: Upcoming surgery/procedure 11.23.2022 at [facility] to have fluid removed from abdomin. Should be at 2.0 for procedure.       PLAN     Recommended plan for ongoing change(s) affecting INR     Dosing Instructions: hold 2 doses then decrease your warfarin dose (10% change) with next INR in 2 days       Summary  As of 11/21/2022    Full warfarin instructions:  11/21: Hold; 11/22: Hold; Otherwise 5 mg every Mon, Fri; 2.5 mg all other days; Starting 11/21/2022   Next INR check:  11/23/2022             Telephone call with Susan who verbalizes understanding and agrees to plan, declined for me to inform Zenaida daughter.    Check at provider office visit    Education provided:     None required    Plan made with [facility] Pharmacist Alicia Lan, RN  Anticoagulation Clinic  11/21/2022    _______________________________________________________________________     Anticoagulation Episode Summary     Current INR goal:  2.0-3.0   TTR:  53.1 % (1 y)   Target end date:  Indefinite   Send INR reminders to:  ANTICOAG HOME MONITORING    Indications    DVT (deep venous thrombosis) (H) [I82.409]  Factor V Leiden (H) [D68.51]  History of deep venous thrombosis [Z86.718]  Long term current use of anticoagulants  with INR goal of 2.0-3.0 [Z79.01]  Acute deep vein thrombosis (DVT) of right lower extremity  unspecified vein (H) [I82.401]           Comments:  [employer] [facility] meter- Wants a call every result; homozygous Factor V         Anticoagulation Care Providers     Provider Role Specialty Phone number    Jimi Wan MD Referring Internal Medicine [phone]    Chaka Martinez MD Referring Internal Medicine [phone]    Savannah Moe MD Referring Internal Medicine [phone]

## 2022-12-07 NOTE — PROGRESS NOTES
ANTICOAGULATION     Susan Yulisa Ferguson is overdue for INR check.      Was unable to reach patient and was unable to leave a voicemail. If patient calls, please schedule INR check as soon as possible.      Reminder letter sent, voice mailbox is full.    Seda Sanchez RN

## 2022-12-07 NOTE — LETTER
[facility] ANTICOAGULATION CLINIC  [address] [facility] [address]  Phone: [phone]  Fax: [phone]   December 7, 2022        Susan Ferguson  [address] [place] [address] [place] [id number]            Dear Susan,    You are currently under the care of [facility] for your warfarin (Coumadin , Jantoven ) therapy.  We are contacting you because our records show you were due for an INR on 11/23/22.    There are potentially serious risks when taking warfarin without careful monitoring and we want to make sure you are safely managed.  Routine lab monitoring is required for warfarin refills.     Please call [phone]  as soon as possible to schedule an appointment.  If there has been a change in your care or other concerns, please let us know so we can help and or update our records.     Sincerely,       [facility]

## 2022-12-09 NOTE — TELEPHONE ENCOUNTER
Marlin Andre  Specialist from [facility] calling requesting Hospital Follow Up be scheduled for this patient with PCP.     Patient is being discharged from hospital today and needs a follow up appt with PCP. Marlin also reports that providers overseeing patient care need a Cbc and CMP drawn as well.    RN assisted in helping patient get scheduled with PCP.     Routing to PCP for review. Should we keep current appt scheduled and can labs be placed for this patient?

## 2022-12-13 NOTE — TELEPHONE ENCOUNTER
Received a voicemail message from patient's daughter the patient was IP at [facility] and has since discharged to [facility].  The patient is a home monitor patient thru [facility] and has been receiving calls from [facility] to check the INR.    TOÑO McnealN, RN  Anticoagulation Clinic

## 2022-12-14 NOTE — TELEPHONE ENCOUNTER
Left message on [employer]'s voice mail if [facility] is managing INR then [facility] will resume managing when patient is discharged.  Advised to call [facility] if there are any further questions.    TOÑO McnealN, RN  Anticoagulation Clinic

## 2022-12-28 ENCOUNTER — DOCUMENTATION ONLY (OUTPATIENT)
Dept: ANTICOAGULATION | Facility: CLINIC | Age: 82
End: 2022-12-28

## 2023-01-01 NOTE — MR AVS SNAPSHOT
Susan Ferguson   4/5/2018 11:45 AM   Anticoagulation Therapy Visit    Description:  77 year old female   Provider:   ANTICOAGULATION CLINIC   Department:   Nurse           INR as of 4/5/2018     Today's INR 3.2!      Anticoagulation Summary as of 4/5/2018     INR goal 2.0-3.0    Today's INR 3.2!    Full instructions 7.5 mg on Mon; 5 mg all other days    Next INR check 5/10/2018      Your next Anticoagulation Clinic appointment(s)     Apr 05, 2018 11:45 AM CDT   Anticoagulation Visit with  ANTICO[facility]ULATION CLINIC   [facility]    [address]  [place] [address]   [phone]            May 10, 2018 11:15 AM CDT   Anticoagulation Visit with  ANTICOAGULATION CLINIC   [facility]    [address]  [place] [address]   [phone]              Contact Numbers     Clinic Number:         April 2018 Details    Sun Mon Tue Wed Thu Fri Sat     1               2               3               4               5      5 mg   See details      6      5 mg         7      5 mg           8      5 mg         9      7.5 mg         10      5 mg         11      5 mg         12      5 mg         13      5 mg         14      5 mg           15      5 mg         16      7.5 mg         17      5 mg         18      5 mg         19      5 mg         20      5 mg         21      5 mg           22      5 mg         23      7.5 mg         24      5 mg         25      5 mg         26      5 mg         27      5 mg         28      5 mg           29      5 mg         30      7.5 mg               Date Details   04/05 This INR check               How to take your warfarin dose     To take:  5 mg Take 1 of the 5 mg tablets.    To take:  7.5 mg Take 1.5 of the 5 mg tablets.           May 2018 Details    Sun Mon Tue Wed Thu Fri Sat       1      5 mg         2      5 mg         3      5 mg         4      5 mg         5      5 mg           6      5 mg         7       7.5 mg         8      5 mg         9      5 mg         10            11               12                 13               14               15               16               17               18               19                 20               21               22               23               24               25               26                 27               28               29               30               31                  Date Details   No additional details    Date of next INR:  5/10/2018         How to take your warfarin dose     To take:  5 mg Take 1 of the 5 mg tablets.    To take:  7.5 mg Take 1.5 of the 5 mg tablets.            39w2d